# Patient Record
Sex: FEMALE | Race: WHITE | NOT HISPANIC OR LATINO | Employment: OTHER | URBAN - METROPOLITAN AREA
[De-identification: names, ages, dates, MRNs, and addresses within clinical notes are randomized per-mention and may not be internally consistent; named-entity substitution may affect disease eponyms.]

---

## 2017-03-20 ENCOUNTER — TRANSCRIBE ORDERS (OUTPATIENT)
Dept: LAB | Facility: CLINIC | Age: 82
End: 2017-03-20

## 2017-03-20 ENCOUNTER — APPOINTMENT (OUTPATIENT)
Dept: LAB | Facility: CLINIC | Age: 82
End: 2017-03-20
Payer: COMMERCIAL

## 2017-03-20 ENCOUNTER — GENERIC CONVERSION - ENCOUNTER (OUTPATIENT)
Dept: OTHER | Facility: OTHER | Age: 82
End: 2017-03-20

## 2017-03-20 DIAGNOSIS — R39.15 URGENCY OF URINATION: ICD-10-CM

## 2017-03-20 DIAGNOSIS — R39.15 URGENCY OF URINATION: Primary | ICD-10-CM

## 2017-03-20 DIAGNOSIS — I25.10 ATHEROSCLEROTIC HEART DISEASE OF NATIVE CORONARY ARTERY WITHOUT ANGINA PECTORIS: ICD-10-CM

## 2017-03-20 DIAGNOSIS — M81.0 AGE-RELATED OSTEOPOROSIS WITHOUT CURRENT PATHOLOGICAL FRACTURE: ICD-10-CM

## 2017-03-20 DIAGNOSIS — E78.5 HYPERLIPIDEMIA: ICD-10-CM

## 2017-03-20 LAB
BACTERIA UR QL AUTO: ABNORMAL /HPF
BILIRUB UR QL STRIP: NEGATIVE
CLARITY UR: ABNORMAL
COLOR UR: YELLOW
GLUCOSE UR STRIP-MCNC: NEGATIVE MG/DL
HGB UR QL STRIP.AUTO: ABNORMAL
KETONES UR STRIP-MCNC: NEGATIVE MG/DL
LEUKOCYTE ESTERASE UR QL STRIP: ABNORMAL
NITRITE UR QL STRIP: POSITIVE
NON-SQ EPI CELLS URNS QL MICRO: ABNORMAL /HPF
PH UR STRIP.AUTO: 6 [PH] (ref 4.5–8)
PROT UR STRIP-MCNC: NEGATIVE MG/DL
RBC #/AREA URNS AUTO: ABNORMAL /HPF
SP GR UR STRIP.AUTO: 1.01 (ref 1–1.03)
UROBILINOGEN UR QL STRIP.AUTO: 0.2 E.U./DL
WBC #/AREA URNS AUTO: ABNORMAL /HPF

## 2017-03-20 PROCEDURE — 81001 URINALYSIS AUTO W/SCOPE: CPT

## 2017-03-20 PROCEDURE — 87077 CULTURE AEROBIC IDENTIFY: CPT

## 2017-03-20 PROCEDURE — 87086 URINE CULTURE/COLONY COUNT: CPT

## 2017-03-20 PROCEDURE — 87186 SC STD MICRODIL/AGAR DIL: CPT

## 2017-03-22 LAB — BACTERIA UR CULT: NORMAL

## 2017-03-23 ENCOUNTER — GENERIC CONVERSION - ENCOUNTER (OUTPATIENT)
Dept: OTHER | Facility: OTHER | Age: 82
End: 2017-03-23

## 2017-03-29 ENCOUNTER — GENERIC CONVERSION - ENCOUNTER (OUTPATIENT)
Dept: OTHER | Facility: OTHER | Age: 82
End: 2017-03-29

## 2017-04-07 ENCOUNTER — ALLSCRIPTS OFFICE VISIT (OUTPATIENT)
Dept: OTHER | Facility: OTHER | Age: 82
End: 2017-04-07

## 2017-05-08 ENCOUNTER — APPOINTMENT (OUTPATIENT)
Dept: LAB | Facility: CLINIC | Age: 82
End: 2017-05-08
Payer: COMMERCIAL

## 2017-05-08 ENCOUNTER — TRANSCRIBE ORDERS (OUTPATIENT)
Dept: LAB | Facility: CLINIC | Age: 82
End: 2017-05-08

## 2017-05-08 ENCOUNTER — GENERIC CONVERSION - ENCOUNTER (OUTPATIENT)
Dept: OTHER | Facility: OTHER | Age: 82
End: 2017-05-08

## 2017-05-08 DIAGNOSIS — I25.10 ATHEROSCLEROTIC HEART DISEASE OF NATIVE CORONARY ARTERY WITHOUT ANGINA PECTORIS: ICD-10-CM

## 2017-05-08 DIAGNOSIS — M81.0 AGE-RELATED OSTEOPOROSIS WITHOUT CURRENT PATHOLOGICAL FRACTURE: ICD-10-CM

## 2017-05-08 DIAGNOSIS — E78.5 HYPERLIPIDEMIA: ICD-10-CM

## 2017-05-08 LAB
25(OH)D3 SERPL-MCNC: 38 NG/ML (ref 30–100)
ALBUMIN SERPL BCP-MCNC: 3.3 G/DL (ref 3.5–5)
ALP SERPL-CCNC: 71 U/L (ref 46–116)
ALT SERPL W P-5'-P-CCNC: 21 U/L (ref 12–78)
ANION GAP SERPL CALCULATED.3IONS-SCNC: 7 MMOL/L (ref 4–13)
AST SERPL W P-5'-P-CCNC: 26 U/L (ref 5–45)
BASOPHILS # BLD AUTO: 0.03 THOUSANDS/ΜL (ref 0–0.1)
BASOPHILS NFR BLD AUTO: 1 % (ref 0–1)
BILIRUB SERPL-MCNC: 0.6 MG/DL (ref 0.2–1)
BUN SERPL-MCNC: 17 MG/DL (ref 5–25)
CALCIUM SERPL-MCNC: 8.9 MG/DL (ref 8.3–10.1)
CHLORIDE SERPL-SCNC: 108 MMOL/L (ref 100–108)
CHOLEST SERPL-MCNC: 182 MG/DL (ref 50–200)
CO2 SERPL-SCNC: 30 MMOL/L (ref 21–32)
CREAT SERPL-MCNC: 0.91 MG/DL (ref 0.6–1.3)
EOSINOPHIL # BLD AUTO: 0.11 THOUSAND/ΜL (ref 0–0.61)
EOSINOPHIL NFR BLD AUTO: 2 % (ref 0–6)
ERYTHROCYTE [DISTWIDTH] IN BLOOD BY AUTOMATED COUNT: 15 % (ref 11.6–15.1)
GFR SERPL CREATININE-BSD FRML MDRD: 59.3 ML/MIN/1.73SQ M
GLUCOSE P FAST SERPL-MCNC: 93 MG/DL (ref 65–99)
HCT VFR BLD AUTO: 37.2 % (ref 34.8–46.1)
HDLC SERPL-MCNC: 72 MG/DL (ref 40–60)
HGB BLD-MCNC: 11.7 G/DL (ref 11.5–15.4)
LDLC SERPL CALC-MCNC: 94 MG/DL (ref 0–100)
LYMPHOCYTES # BLD AUTO: 2.46 THOUSANDS/ΜL (ref 0.6–4.47)
LYMPHOCYTES NFR BLD AUTO: 37 % (ref 14–44)
MCH RBC QN AUTO: 32 PG (ref 26.8–34.3)
MCHC RBC AUTO-ENTMCNC: 31.5 G/DL (ref 31.4–37.4)
MCV RBC AUTO: 102 FL (ref 82–98)
MONOCYTES # BLD AUTO: 0.65 THOUSAND/ΜL (ref 0.17–1.22)
MONOCYTES NFR BLD AUTO: 10 % (ref 4–12)
NEUTROPHILS # BLD AUTO: 3.41 THOUSANDS/ΜL (ref 1.85–7.62)
NEUTS SEG NFR BLD AUTO: 50 % (ref 43–75)
PLATELET # BLD AUTO: 174 THOUSANDS/UL (ref 149–390)
PMV BLD AUTO: 8.6 FL (ref 8.9–12.7)
POTASSIUM SERPL-SCNC: 3.6 MMOL/L (ref 3.5–5.3)
PROT SERPL-MCNC: 6.2 G/DL (ref 6.4–8.2)
RBC # BLD AUTO: 3.66 MILLION/UL (ref 3.81–5.12)
SODIUM SERPL-SCNC: 145 MMOL/L (ref 136–145)
TRIGL SERPL-MCNC: 82 MG/DL
WBC # BLD AUTO: 6.66 THOUSAND/UL (ref 4.31–10.16)

## 2017-05-08 PROCEDURE — 82306 VITAMIN D 25 HYDROXY: CPT

## 2017-05-08 PROCEDURE — 36415 COLL VENOUS BLD VENIPUNCTURE: CPT

## 2017-05-08 PROCEDURE — 85025 COMPLETE CBC W/AUTO DIFF WBC: CPT

## 2017-05-08 PROCEDURE — 80061 LIPID PANEL: CPT

## 2017-05-08 PROCEDURE — 80053 COMPREHEN METABOLIC PANEL: CPT

## 2017-05-31 ENCOUNTER — APPOINTMENT (EMERGENCY)
Dept: RADIOLOGY | Facility: HOSPITAL | Age: 82
DRG: 203 | End: 2017-05-31
Payer: COMMERCIAL

## 2017-05-31 ENCOUNTER — HOSPITAL ENCOUNTER (INPATIENT)
Facility: HOSPITAL | Age: 82
LOS: 3 days | Discharge: HOME | DRG: 203 | End: 2017-06-03
Attending: EMERGENCY MEDICINE | Admitting: INTERNAL MEDICINE
Payer: COMMERCIAL

## 2017-05-31 DIAGNOSIS — N39.0 UTI (URINARY TRACT INFECTION): ICD-10-CM

## 2017-05-31 DIAGNOSIS — J06.9 UPPER RESPIRATORY INFECTION: ICD-10-CM

## 2017-05-31 DIAGNOSIS — R50.9 FEVER: Primary | ICD-10-CM

## 2017-05-31 PROBLEM — R05.9 COUGH: Status: ACTIVE | Noted: 2017-05-31

## 2017-05-31 LAB
ALBUMIN SERPL BCP-MCNC: 3.6 G/DL (ref 3.5–5)
ALP SERPL-CCNC: 91 U/L (ref 46–116)
ALT SERPL W P-5'-P-CCNC: 18 U/L (ref 12–78)
ANION GAP SERPL CALCULATED.3IONS-SCNC: 9 MMOL/L (ref 4–13)
APTT PPP: 26 SECONDS (ref 23–35)
AST SERPL W P-5'-P-CCNC: 28 U/L (ref 5–45)
BACTERIA UR QL AUTO: ABNORMAL /HPF
BASOPHILS # BLD AUTO: 0.04 THOUSANDS/ΜL (ref 0–0.1)
BASOPHILS NFR BLD AUTO: 0 % (ref 0–1)
BILIRUB SERPL-MCNC: 0.8 MG/DL (ref 0.2–1)
BILIRUB UR QL STRIP: NEGATIVE
BUN SERPL-MCNC: 8 MG/DL (ref 5–25)
CALCIUM SERPL-MCNC: 8.9 MG/DL (ref 8.3–10.1)
CHLORIDE SERPL-SCNC: 102 MMOL/L (ref 100–108)
CLARITY UR: ABNORMAL
CO2 SERPL-SCNC: 29 MMOL/L (ref 21–32)
COLOR UR: YELLOW
CREAT SERPL-MCNC: 0.82 MG/DL (ref 0.6–1.3)
EOSINOPHIL # BLD AUTO: 0.08 THOUSAND/ΜL (ref 0–0.61)
EOSINOPHIL NFR BLD AUTO: 1 % (ref 0–6)
ERYTHROCYTE [DISTWIDTH] IN BLOOD BY AUTOMATED COUNT: 14.3 % (ref 11.6–15.1)
GFR SERPL CREATININE-BSD FRML MDRD: >60 ML/MIN/1.73SQ M
GLUCOSE SERPL-MCNC: 109 MG/DL (ref 65–140)
GLUCOSE UR STRIP-MCNC: NEGATIVE MG/DL
HCT VFR BLD AUTO: 39.3 % (ref 34.8–46.1)
HGB BLD-MCNC: 12.6 G/DL (ref 11.5–15.4)
HGB UR QL STRIP.AUTO: ABNORMAL
INR PPP: 1 (ref 0.86–1.16)
KETONES UR STRIP-MCNC: ABNORMAL MG/DL
LACTATE SERPL-SCNC: 1.3 MMOL/L (ref 0.5–2)
LEUKOCYTE ESTERASE UR QL STRIP: ABNORMAL
LYMPHOCYTES # BLD AUTO: 1.69 THOUSANDS/ΜL (ref 0.6–4.47)
LYMPHOCYTES NFR BLD AUTO: 17 % (ref 14–44)
MCH RBC QN AUTO: 32.4 PG (ref 26.8–34.3)
MCHC RBC AUTO-ENTMCNC: 32.1 G/DL (ref 31.4–37.4)
MCV RBC AUTO: 101 FL (ref 82–98)
MONOCYTES # BLD AUTO: 0.83 THOUSAND/ΜL (ref 0.17–1.22)
MONOCYTES NFR BLD AUTO: 8 % (ref 4–12)
NEUTROPHILS # BLD AUTO: 7.4 THOUSANDS/ΜL (ref 1.85–7.62)
NEUTS SEG NFR BLD AUTO: 74 % (ref 43–75)
NITRITE UR QL STRIP: POSITIVE
NON-SQ EPI CELLS URNS QL MICRO: ABNORMAL /HPF
OTHER STN SPEC: ABNORMAL
PH UR STRIP.AUTO: 6 [PH] (ref 4.5–8)
PLATELET # BLD AUTO: 169 THOUSANDS/UL (ref 149–390)
PMV BLD AUTO: 9.1 FL (ref 8.9–12.7)
POTASSIUM SERPL-SCNC: 3.4 MMOL/L (ref 3.5–5.3)
PROT SERPL-MCNC: 6.9 G/DL (ref 6.4–8.2)
PROT UR STRIP-MCNC: ABNORMAL MG/DL
PROTHROMBIN TIME: 13.5 SECONDS (ref 12.1–14.4)
RBC # BLD AUTO: 3.89 MILLION/UL (ref 3.81–5.12)
RBC #/AREA URNS AUTO: ABNORMAL /HPF
SODIUM SERPL-SCNC: 140 MMOL/L (ref 136–145)
SP GR UR STRIP.AUTO: 1.01 (ref 1–1.03)
UROBILINOGEN UR QL STRIP.AUTO: 0.2 E.U./DL
WBC # BLD AUTO: 10.04 THOUSAND/UL (ref 4.31–10.16)
WBC #/AREA URNS AUTO: ABNORMAL /HPF

## 2017-05-31 PROCEDURE — A9270 NON-COVERED ITEM OR SERVICE: HCPCS | Performed by: EMERGENCY MEDICINE

## 2017-05-31 PROCEDURE — 93005 ELECTROCARDIOGRAM TRACING: CPT | Performed by: EMERGENCY MEDICINE

## 2017-05-31 PROCEDURE — 85025 COMPLETE CBC W/AUTO DIFF WBC: CPT | Performed by: EMERGENCY MEDICINE

## 2017-05-31 PROCEDURE — 83605 ASSAY OF LACTIC ACID: CPT | Performed by: EMERGENCY MEDICINE

## 2017-05-31 PROCEDURE — 36415 COLL VENOUS BLD VENIPUNCTURE: CPT | Performed by: EMERGENCY MEDICINE

## 2017-05-31 PROCEDURE — 87077 CULTURE AEROBIC IDENTIFY: CPT | Performed by: EMERGENCY MEDICINE

## 2017-05-31 PROCEDURE — 87040 BLOOD CULTURE FOR BACTERIA: CPT | Performed by: EMERGENCY MEDICINE

## 2017-05-31 PROCEDURE — 85610 PROTHROMBIN TIME: CPT | Performed by: EMERGENCY MEDICINE

## 2017-05-31 PROCEDURE — 81001 URINALYSIS AUTO W/SCOPE: CPT | Performed by: EMERGENCY MEDICINE

## 2017-05-31 PROCEDURE — 96360 HYDRATION IV INFUSION INIT: CPT

## 2017-05-31 PROCEDURE — 99284 EMERGENCY DEPT VISIT MOD MDM: CPT

## 2017-05-31 PROCEDURE — 87086 URINE CULTURE/COLONY COUNT: CPT | Performed by: EMERGENCY MEDICINE

## 2017-05-31 PROCEDURE — 71020 HB CHEST X-RAY 2VW FRONTAL&LATL: CPT

## 2017-05-31 PROCEDURE — 85730 THROMBOPLASTIN TIME PARTIAL: CPT | Performed by: EMERGENCY MEDICINE

## 2017-05-31 PROCEDURE — 87186 SC STD MICRODIL/AGAR DIL: CPT | Performed by: EMERGENCY MEDICINE

## 2017-05-31 PROCEDURE — 96361 HYDRATE IV INFUSION ADD-ON: CPT

## 2017-05-31 PROCEDURE — 80053 COMPREHEN METABOLIC PANEL: CPT | Performed by: EMERGENCY MEDICINE

## 2017-05-31 RX ORDER — ACETAMINOPHEN 325 MG/1
650 TABLET ORAL EVERY 6 HOURS PRN
Status: DISCONTINUED | OUTPATIENT
Start: 2017-05-31 | End: 2017-06-03 | Stop reason: HOSPADM

## 2017-05-31 RX ORDER — 0.9 % SODIUM CHLORIDE 0.9 %
3 VIAL (ML) INJECTION AS NEEDED
Status: DISCONTINUED | OUTPATIENT
Start: 2017-05-31 | End: 2017-06-03 | Stop reason: HOSPADM

## 2017-05-31 RX ORDER — SACCHAROMYCES BOULARDII 250 MG
250 CAPSULE ORAL 2 TIMES DAILY
Status: DISCONTINUED | OUTPATIENT
Start: 2017-06-01 | End: 2017-06-03 | Stop reason: HOSPADM

## 2017-05-31 RX ORDER — FAMOTIDINE 20 MG/1
20 TABLET, FILM COATED ORAL 2 TIMES DAILY
Status: DISCONTINUED | OUTPATIENT
Start: 2017-06-01 | End: 2017-06-03 | Stop reason: HOSPADM

## 2017-05-31 RX ORDER — ACETAMINOPHEN 325 MG/1
975 TABLET ORAL ONCE
Status: COMPLETED | OUTPATIENT
Start: 2017-05-31 | End: 2017-05-31

## 2017-05-31 RX ORDER — GUAIFENESIN/DEXTROMETHORPHAN 100-10MG/5
10 SYRUP ORAL ONCE
Status: COMPLETED | OUTPATIENT
Start: 2017-05-31 | End: 2017-05-31

## 2017-05-31 RX ORDER — PANTOPRAZOLE SODIUM 40 MG/1
40 TABLET, DELAYED RELEASE ORAL
Status: DISCONTINUED | OUTPATIENT
Start: 2017-06-01 | End: 2017-06-03 | Stop reason: HOSPADM

## 2017-05-31 RX ORDER — BENZONATATE 100 MG/1
100 CAPSULE ORAL 3 TIMES DAILY PRN
Status: DISCONTINUED | OUTPATIENT
Start: 2017-05-31 | End: 2017-06-01

## 2017-05-31 RX ORDER — NITROGLYCERIN 0.4 MG/1
0.4 TABLET SUBLINGUAL
Status: DISCONTINUED | OUTPATIENT
Start: 2017-05-31 | End: 2017-06-03 | Stop reason: HOSPADM

## 2017-05-31 RX ORDER — PRASUGREL 10 MG/1
10 TABLET, FILM COATED ORAL DAILY
Status: DISCONTINUED | OUTPATIENT
Start: 2017-06-01 | End: 2017-06-03 | Stop reason: HOSPADM

## 2017-05-31 RX ORDER — IBUPROFEN 600 MG/1
600 TABLET ORAL ONCE
Status: DISCONTINUED | OUTPATIENT
Start: 2017-05-31 | End: 2017-05-31

## 2017-05-31 RX ORDER — NIACIN 500 MG/1
1000 TABLET, EXTENDED RELEASE ORAL
Status: DISCONTINUED | OUTPATIENT
Start: 2017-05-31 | End: 2017-06-03 | Stop reason: HOSPADM

## 2017-05-31 RX ORDER — SODIUM CHLORIDE 9 MG/ML
125 INJECTION, SOLUTION INTRAVENOUS CONTINUOUS
Status: DISCONTINUED | OUTPATIENT
Start: 2017-05-31 | End: 2017-05-31

## 2017-05-31 RX ORDER — ASPIRIN 81 MG/1
81 TABLET, CHEWABLE ORAL DAILY
Status: DISCONTINUED | OUTPATIENT
Start: 2017-06-01 | End: 2017-06-03 | Stop reason: HOSPADM

## 2017-05-31 RX ORDER — ONDANSETRON 2 MG/ML
4 INJECTION INTRAMUSCULAR; INTRAVENOUS EVERY 6 HOURS PRN
Status: DISCONTINUED | OUTPATIENT
Start: 2017-05-31 | End: 2017-06-03 | Stop reason: HOSPADM

## 2017-05-31 RX ORDER — GUAIFENESIN 600 MG
600 TABLET, EXTENDED RELEASE 12 HR ORAL 2 TIMES DAILY
Status: DISCONTINUED | OUTPATIENT
Start: 2017-06-01 | End: 2017-06-03 | Stop reason: HOSPADM

## 2017-05-31 RX ORDER — GUAIFENESIN 100 MG/5ML
200 SOLUTION ORAL EVERY 4 HOURS PRN
Status: DISCONTINUED | OUTPATIENT
Start: 2017-05-31 | End: 2017-06-01

## 2017-05-31 RX ORDER — PRAVASTATIN SODIUM 80 MG/1
80 TABLET ORAL
Status: DISCONTINUED | OUTPATIENT
Start: 2017-06-01 | End: 2017-06-03 | Stop reason: HOSPADM

## 2017-05-31 RX ADMIN — AZTREONAM 2000 MG: 1 INJECTION, POWDER, LYOPHILIZED, FOR SOLUTION INTRAMUSCULAR; INTRAVENOUS at 21:50

## 2017-05-31 RX ADMIN — ACETAMINOPHEN 975 MG: 325 TABLET, FILM COATED ORAL at 19:20

## 2017-05-31 RX ADMIN — SODIUM CHLORIDE 125 ML/HR: 0.9 INJECTION, SOLUTION INTRAVENOUS at 19:20

## 2017-05-31 RX ADMIN — GUAIFENESIN AND DEXTROMETHORPHAN 10 ML: 100; 10 SYRUP ORAL at 19:20

## 2017-06-01 LAB
ANION GAP SERPL CALCULATED.3IONS-SCNC: 10 MMOL/L (ref 4–13)
ATRIAL RATE: 85 BPM
BUN SERPL-MCNC: 7 MG/DL (ref 5–25)
CALCIUM SERPL-MCNC: 8.8 MG/DL (ref 8.3–10.1)
CHLORIDE SERPL-SCNC: 107 MMOL/L (ref 100–108)
CO2 SERPL-SCNC: 26 MMOL/L (ref 21–32)
CREAT SERPL-MCNC: 0.7 MG/DL (ref 0.6–1.3)
ERYTHROCYTE [DISTWIDTH] IN BLOOD BY AUTOMATED COUNT: 14.2 % (ref 11.6–15.1)
GFR SERPL CREATININE-BSD FRML MDRD: >60 ML/MIN/1.73SQ M
GLUCOSE SERPL-MCNC: 96 MG/DL (ref 65–140)
HCT VFR BLD AUTO: 36 % (ref 34.8–46.1)
HGB BLD-MCNC: 11.5 G/DL (ref 11.5–15.4)
MCH RBC QN AUTO: 32 PG (ref 26.8–34.3)
MCHC RBC AUTO-ENTMCNC: 31.9 G/DL (ref 31.4–37.4)
MCV RBC AUTO: 100 FL (ref 82–98)
P AXIS: 59 DEGREES
PLATELET # BLD AUTO: 158 THOUSANDS/UL (ref 149–390)
PMV BLD AUTO: 9.4 FL (ref 8.9–12.7)
POTASSIUM SERPL-SCNC: 3.4 MMOL/L (ref 3.5–5.3)
PR INTERVAL: 184 MS
QRS AXIS: 9 DEGREES
QRSD INTERVAL: 84 MS
QT INTERVAL: 354 MS
QTC INTERVAL: 421 MS
RBC # BLD AUTO: 3.59 MILLION/UL (ref 3.81–5.12)
S PYO AG THROAT QL: NEGATIVE
SODIUM SERPL-SCNC: 143 MMOL/L (ref 136–145)
T WAVE AXIS: 41 DEGREES
VENTRICULAR RATE: 85 BPM
WBC # BLD AUTO: 9.01 THOUSAND/UL (ref 4.31–10.16)

## 2017-06-01 PROCEDURE — A9270 NON-COVERED ITEM OR SERVICE: HCPCS | Performed by: PHYSICIAN ASSISTANT

## 2017-06-01 PROCEDURE — A9270 NON-COVERED ITEM OR SERVICE: HCPCS | Performed by: INTERNAL MEDICINE

## 2017-06-01 PROCEDURE — 87633 RESP VIRUS 12-25 TARGETS: CPT | Performed by: INTERNAL MEDICINE

## 2017-06-01 PROCEDURE — 87430 STREP A AG IA: CPT | Performed by: INTERNAL MEDICINE

## 2017-06-01 PROCEDURE — 80048 BASIC METABOLIC PNL TOTAL CA: CPT | Performed by: PHYSICIAN ASSISTANT

## 2017-06-01 PROCEDURE — 85027 COMPLETE CBC AUTOMATED: CPT | Performed by: PHYSICIAN ASSISTANT

## 2017-06-01 PROCEDURE — 87070 CULTURE OTHR SPECIMN AEROBIC: CPT | Performed by: INTERNAL MEDICINE

## 2017-06-01 RX ORDER — POTASSIUM CHLORIDE 20 MEQ/1
40 TABLET, EXTENDED RELEASE ORAL ONCE
Status: COMPLETED | OUTPATIENT
Start: 2017-06-01 | End: 2017-06-01

## 2017-06-01 RX ORDER — BENZONATATE 100 MG/1
200 CAPSULE ORAL 3 TIMES DAILY
Status: DISCONTINUED | OUTPATIENT
Start: 2017-06-01 | End: 2017-06-03 | Stop reason: HOSPADM

## 2017-06-01 RX ORDER — GUAIFENESIN/DEXTROMETHORPHAN 100-10MG/5
10 SYRUP ORAL EVERY 6 HOURS PRN
Status: DISCONTINUED | OUTPATIENT
Start: 2017-06-01 | End: 2017-06-03 | Stop reason: HOSPADM

## 2017-06-01 RX ADMIN — FAMOTIDINE 20 MG: 20 TABLET ORAL at 10:17

## 2017-06-01 RX ADMIN — NIACIN 1000 MG: 500 TABLET, FILM COATED, EXTENDED RELEASE ORAL at 00:39

## 2017-06-01 RX ADMIN — GUAIFENESIN 600 MG: 600 TABLET, EXTENDED RELEASE ORAL at 10:17

## 2017-06-01 RX ADMIN — BENZONATATE 200 MG: 100 CAPSULE ORAL at 20:46

## 2017-06-01 RX ADMIN — Medication 250 MG: at 17:32

## 2017-06-01 RX ADMIN — Medication 250 MG: at 10:17

## 2017-06-01 RX ADMIN — NIACIN 1000 MG: 500 TABLET, FILM COATED, EXTENDED RELEASE ORAL at 21:06

## 2017-06-01 RX ADMIN — AZTREONAM 1000 MG: 1 INJECTION, POWDER, LYOPHILIZED, FOR SOLUTION INTRAMUSCULAR; INTRAVENOUS at 14:10

## 2017-06-01 RX ADMIN — FAMOTIDINE 20 MG: 20 TABLET ORAL at 17:31

## 2017-06-01 RX ADMIN — GUAIFENESIN 200 MG: 100 SOLUTION ORAL at 12:52

## 2017-06-01 RX ADMIN — PANTOPRAZOLE SODIUM 40 MG: 40 TABLET, DELAYED RELEASE ORAL at 08:24

## 2017-06-01 RX ADMIN — ACETAMINOPHEN 650 MG: 325 TABLET, FILM COATED ORAL at 20:46

## 2017-06-01 RX ADMIN — BENZONATATE 200 MG: 100 CAPSULE ORAL at 17:30

## 2017-06-01 RX ADMIN — GUAIFENESIN AND DEXTROMETHORPHAN 10 ML: 100; 10 SYRUP ORAL at 20:47

## 2017-06-01 RX ADMIN — GUAIFENESIN 200 MG: 100 SOLUTION ORAL at 00:39

## 2017-06-01 RX ADMIN — POTASSIUM CHLORIDE 40 MEQ: 1500 TABLET, EXTENDED RELEASE ORAL at 14:10

## 2017-06-01 RX ADMIN — AZTREONAM 1000 MG: 1 INJECTION, POWDER, LYOPHILIZED, FOR SOLUTION INTRAMUSCULAR; INTRAVENOUS at 04:34

## 2017-06-01 RX ADMIN — PRAVASTATIN SODIUM 80 MG: 80 TABLET ORAL at 17:30

## 2017-06-01 RX ADMIN — GUAIFENESIN 200 MG: 100 SOLUTION ORAL at 04:44

## 2017-06-01 RX ADMIN — BENZONATATE 100 MG: 100 CAPSULE ORAL at 08:38

## 2017-06-01 RX ADMIN — GUAIFENESIN 600 MG: 600 TABLET, EXTENDED RELEASE ORAL at 17:31

## 2017-06-01 RX ADMIN — ASPIRIN 81 MG 81 MG: 81 TABLET ORAL at 10:17

## 2017-06-01 RX ADMIN — ENOXAPARIN SODIUM 40 MG: 40 INJECTION SUBCUTANEOUS at 08:25

## 2017-06-02 PROBLEM — J40 TRACHEOBRONCHITIS: Status: ACTIVE | Noted: 2017-05-31

## 2017-06-02 LAB
ANION GAP SERPL CALCULATED.3IONS-SCNC: 5 MMOL/L (ref 4–13)
BACTERIA UR CULT: NORMAL
BASOPHILS # BLD AUTO: 0.05 THOUSANDS/ΜL (ref 0–0.1)
BASOPHILS NFR BLD AUTO: 1 % (ref 0–1)
BUN SERPL-MCNC: 10 MG/DL (ref 5–25)
CALCIUM SERPL-MCNC: 8.6 MG/DL (ref 8.3–10.1)
CHLORIDE SERPL-SCNC: 109 MMOL/L (ref 100–108)
CO2 SERPL-SCNC: 27 MMOL/L (ref 21–32)
CREAT SERPL-MCNC: 0.72 MG/DL (ref 0.6–1.3)
EOSINOPHIL # BLD AUTO: 0.31 THOUSAND/ΜL (ref 0–0.61)
EOSINOPHIL NFR BLD AUTO: 5 % (ref 0–6)
ERYTHROCYTE [DISTWIDTH] IN BLOOD BY AUTOMATED COUNT: 14.1 % (ref 11.6–15.1)
GFR SERPL CREATININE-BSD FRML MDRD: >60 ML/MIN/1.73SQ M
GLUCOSE SERPL-MCNC: 95 MG/DL (ref 65–140)
HCT VFR BLD AUTO: 34.5 % (ref 34.8–46.1)
HGB BLD-MCNC: 10.8 G/DL (ref 11.5–15.4)
LYMPHOCYTES # BLD AUTO: 2.08 THOUSANDS/ΜL (ref 0.6–4.47)
LYMPHOCYTES NFR BLD AUTO: 36 % (ref 14–44)
MAGNESIUM SERPL-MCNC: 1.9 MG/DL (ref 1.6–2.6)
MCH RBC QN AUTO: 31.8 PG (ref 26.8–34.3)
MCHC RBC AUTO-ENTMCNC: 31.3 G/DL (ref 31.4–37.4)
MCV RBC AUTO: 102 FL (ref 82–98)
MONOCYTES # BLD AUTO: 0.9 THOUSAND/ΜL (ref 0.17–1.22)
MONOCYTES NFR BLD AUTO: 16 % (ref 4–12)
NEUTROPHILS # BLD AUTO: 2.39 THOUSANDS/ΜL (ref 1.85–7.62)
NEUTS SEG NFR BLD AUTO: 42 % (ref 43–75)
PLATELET # BLD AUTO: 151 THOUSANDS/UL (ref 149–390)
PMV BLD AUTO: 9.7 FL (ref 8.9–12.7)
POTASSIUM SERPL-SCNC: 4.1 MMOL/L (ref 3.5–5.3)
RBC # BLD AUTO: 3.4 MILLION/UL (ref 3.81–5.12)
SODIUM SERPL-SCNC: 141 MMOL/L (ref 136–145)
WBC # BLD AUTO: 5.73 THOUSAND/UL (ref 4.31–10.16)

## 2017-06-02 PROCEDURE — 83735 ASSAY OF MAGNESIUM: CPT | Performed by: INTERNAL MEDICINE

## 2017-06-02 PROCEDURE — 85025 COMPLETE CBC W/AUTO DIFF WBC: CPT | Performed by: INTERNAL MEDICINE

## 2017-06-02 PROCEDURE — A9270 NON-COVERED ITEM OR SERVICE: HCPCS | Performed by: PHYSICIAN ASSISTANT

## 2017-06-02 PROCEDURE — A9270 NON-COVERED ITEM OR SERVICE: HCPCS | Performed by: INTERNAL MEDICINE

## 2017-06-02 PROCEDURE — 80048 BASIC METABOLIC PNL TOTAL CA: CPT | Performed by: INTERNAL MEDICINE

## 2017-06-02 RX ADMIN — ASPIRIN 81 MG 81 MG: 81 TABLET ORAL at 08:52

## 2017-06-02 RX ADMIN — BENZONATATE 200 MG: 100 CAPSULE ORAL at 21:11

## 2017-06-02 RX ADMIN — MENTHOL 5.4 MG: 5.4 LOZENGE ORAL at 14:30

## 2017-06-02 RX ADMIN — ENOXAPARIN SODIUM 40 MG: 40 INJECTION SUBCUTANEOUS at 08:52

## 2017-06-02 RX ADMIN — GUAIFENESIN 600 MG: 600 TABLET, EXTENDED RELEASE ORAL at 17:49

## 2017-06-02 RX ADMIN — Medication 250 MG: at 17:49

## 2017-06-02 RX ADMIN — PANTOPRAZOLE SODIUM 40 MG: 40 TABLET, DELAYED RELEASE ORAL at 06:16

## 2017-06-02 RX ADMIN — GUAIFENESIN AND DEXTROMETHORPHAN 10 ML: 100; 10 SYRUP ORAL at 06:16

## 2017-06-02 RX ADMIN — FAMOTIDINE 20 MG: 20 TABLET ORAL at 17:49

## 2017-06-02 RX ADMIN — GUAIFENESIN 600 MG: 600 TABLET, EXTENDED RELEASE ORAL at 08:52

## 2017-06-02 RX ADMIN — PRAVASTATIN SODIUM 80 MG: 80 TABLET ORAL at 16:39

## 2017-06-02 RX ADMIN — Medication 250 MG: at 08:52

## 2017-06-02 RX ADMIN — BENZONATATE 200 MG: 100 CAPSULE ORAL at 16:39

## 2017-06-02 RX ADMIN — FAMOTIDINE 20 MG: 20 TABLET ORAL at 08:52

## 2017-06-02 RX ADMIN — NIACIN 1000 MG: 500 TABLET, FILM COATED, EXTENDED RELEASE ORAL at 21:15

## 2017-06-02 RX ADMIN — BENZONATATE 200 MG: 100 CAPSULE ORAL at 08:52

## 2017-06-02 RX ADMIN — GUAIFENESIN AND DEXTROMETHORPHAN 10 ML: 100; 10 SYRUP ORAL at 21:11

## 2017-06-03 VITALS
OXYGEN SATURATION: 99 % | RESPIRATION RATE: 18 BRPM | BODY MASS INDEX: 32.59 KG/M2 | TEMPERATURE: 98.3 F | HEART RATE: 70 BPM | WEIGHT: 172.62 LBS | HEIGHT: 61 IN | SYSTOLIC BLOOD PRESSURE: 150 MMHG | DIASTOLIC BLOOD PRESSURE: 68 MMHG

## 2017-06-03 PROBLEM — N39.0 UTI (URINARY TRACT INFECTION): Status: RESOLVED | Noted: 2017-05-31 | Resolved: 2017-06-03

## 2017-06-03 LAB — BACTERIA THROAT CULT: NORMAL

## 2017-06-03 PROCEDURE — A9270 NON-COVERED ITEM OR SERVICE: HCPCS | Performed by: PHYSICIAN ASSISTANT

## 2017-06-03 PROCEDURE — A9270 NON-COVERED ITEM OR SERVICE: HCPCS | Performed by: INTERNAL MEDICINE

## 2017-06-03 RX ORDER — BENZONATATE 200 MG/1
200 CAPSULE ORAL 3 TIMES DAILY
Qty: 21 CAPSULE | Refills: 0 | Status: SHIPPED | OUTPATIENT
Start: 2017-06-03 | End: 2017-06-10

## 2017-06-03 RX ORDER — SACCHAROMYCES BOULARDII 250 MG
250 CAPSULE ORAL 2 TIMES DAILY
Qty: 20 CAPSULE | Refills: 0 | Status: SHIPPED | OUTPATIENT
Start: 2017-06-03 | End: 2017-06-13

## 2017-06-03 RX ORDER — GUAIFENESIN 600 MG
600 TABLET, EXTENDED RELEASE 12 HR ORAL 2 TIMES DAILY
Qty: 14 TABLET | Refills: 0 | Status: SHIPPED | OUTPATIENT
Start: 2017-06-03 | End: 2017-06-10

## 2017-06-03 RX ADMIN — ASPIRIN 81 MG 81 MG: 81 TABLET ORAL at 08:40

## 2017-06-03 RX ADMIN — ENOXAPARIN SODIUM 40 MG: 40 INJECTION SUBCUTANEOUS at 08:40

## 2017-06-03 RX ADMIN — BENZONATATE 200 MG: 100 CAPSULE ORAL at 08:40

## 2017-06-03 RX ADMIN — FAMOTIDINE 20 MG: 20 TABLET ORAL at 08:41

## 2017-06-03 RX ADMIN — Medication 250 MG: at 08:43

## 2017-06-03 RX ADMIN — GUAIFENESIN AND DEXTROMETHORPHAN 10 ML: 100; 10 SYRUP ORAL at 11:01

## 2017-06-03 RX ADMIN — PANTOPRAZOLE SODIUM 40 MG: 40 TABLET, DELAYED RELEASE ORAL at 06:06

## 2017-06-03 RX ADMIN — GUAIFENESIN 600 MG: 600 TABLET, EXTENDED RELEASE ORAL at 08:41

## 2017-06-05 ENCOUNTER — GENERIC CONVERSION - ENCOUNTER (OUTPATIENT)
Dept: OTHER | Facility: OTHER | Age: 82
End: 2017-06-05

## 2017-06-05 LAB
BACTERIA BLD CULT: NORMAL
BACTERIA BLD CULT: NORMAL

## 2017-06-06 LAB
ADENOVIRUS: NOT DETECTED
C PNEUM DNA SPEC QL NAA+PROBE: NOT DETECTED
FLUAV H1 RNA SPEC QL NAA+PROBE: NOT DETECTED
FLUAV H3 RNA SPEC QL NAA+PROBE: NOT DETECTED
FLUAV RNA SPEC QL NAA+PROBE: NOT DETECTED
FLUBV RNA SPEC QL NAA+PROBE: NOT DETECTED
HCOV 229E RNA SPEC QL NAA+PROBE: NOT DETECTED
HCOV HKU1 RNA SPEC QL NAA+PROBE: NOT DETECTED
HCOV NL63 RNA SPEC QL NAA+PROBE: NOT DETECTED
HCOV OC43 RNA SPEC QL NAA+PROBE: NOT DETECTED
HPIV1 RNA SPEC QL NAA+PROBE: NOT DETECTED
HPIV2 RNA SPEC QL NAA+PROBE: NOT DETECTED
HPIV3 RNA SPEC QL NAA+PROBE: NOT DETECTED
HPIV4 RNA SPEC QL NAA+PROBE: NOT DETECTED
M PNEUMO DNA SPEC QL NAA+PROBE: NOT DETECTED
METAPNEUMOVIRUS: NOT DETECTED
RHINOVIRUS RNA SPEC QL NAA+PROBE: DETECTED
RSV A RNA SPEC QL NAA+PROBE: NOT DETECTED
RSV B RNA SPEC QL NAA+PROBE: NOT DETECTED

## 2017-06-08 ENCOUNTER — GENERIC CONVERSION - ENCOUNTER (OUTPATIENT)
Dept: OTHER | Facility: OTHER | Age: 82
End: 2017-06-08

## 2017-06-12 ENCOUNTER — ALLSCRIPTS OFFICE VISIT (OUTPATIENT)
Dept: OTHER | Facility: OTHER | Age: 82
End: 2017-06-12

## 2017-07-18 ENCOUNTER — GENERIC CONVERSION - ENCOUNTER (OUTPATIENT)
Dept: OTHER | Facility: OTHER | Age: 82
End: 2017-07-18

## 2017-07-20 ENCOUNTER — GENERIC CONVERSION - ENCOUNTER (OUTPATIENT)
Dept: OTHER | Facility: OTHER | Age: 82
End: 2017-07-20

## 2017-07-26 ENCOUNTER — ALLSCRIPTS OFFICE VISIT (OUTPATIENT)
Dept: OTHER | Facility: OTHER | Age: 82
End: 2017-07-26

## 2017-08-03 ENCOUNTER — GENERIC CONVERSION - ENCOUNTER (OUTPATIENT)
Dept: OTHER | Facility: OTHER | Age: 82
End: 2017-08-03

## 2017-09-22 ENCOUNTER — GENERIC CONVERSION - ENCOUNTER (OUTPATIENT)
Dept: OTHER | Facility: OTHER | Age: 82
End: 2017-09-22

## 2017-10-09 ENCOUNTER — ALLSCRIPTS OFFICE VISIT (OUTPATIENT)
Dept: OTHER | Facility: OTHER | Age: 82
End: 2017-10-09

## 2017-10-10 NOTE — PROGRESS NOTES
Assessment  1  Non-smoker (V49 89) (Z78 9)   2  Chronic low back pain (724 2,338 29) (M54 5,G89 29)   3  Dyslipidemia (272 4) (E78 5)   4  Bursitis of left hip (726 5) (M70 72)   5  S/P PTCA (percutaneous transluminal coronary angioplasty) (V45 82) (Z98 61)   6  Esophageal reflux (530 81) (K21 9)    Plan  Dyslipidemia    · (1) CBC/PLT/DIFF; Status:Active; Requested ABJ:[id number];    · (1) COMPREHENSIVE METABOLIC PANEL; Status:Active; Requested for:01Mar2018;    · (1) LIPID PANEL FASTING W DIRECT LDL REFLEX; Status:Active; Requested for:01Mar2018;   Esophageal reflux    · From  Famotidine 20 MG Oral Tablet take 1 tablet by mouth twice daily To Famotidine 20 MG  Oral Tablet take 1 tablet by mouth daily  PMH: History of chest pain    · Nitroglycerin 0 4 MG Sublingual Tablet Sublingual (Nitrostat); place 1 tablet under the tongue  every 5 minutes for up to 3 doses as needed for chest pain  call 911 if pain  persists    Discussion/Summary  Discussion Summary:   Chronic low back pain- exercises given to hernot jackie, vacuum one room at a timetake Tylenol or lidocaine patch OTCcontrolled on simvastatinhip bursitis- pain controlled, will f/u with ortho prnNTG rx given to hershe has been on PPI and H2 blocker, cut back on famotidine to once a [id number] with labs    Counseling Documentation With Imm: The patient was counseled regarding impressions  Medication SE Review and Pt Understands Tx: Possible side effects of new medications were reviewed with the patient/guardian today  The treatment plan was reviewed with the patient/guardian  The patient/guardian understands and agrees with the treatment plan      Chief Complaint  Chief Complaint Free Text Note Form: Patient presents to office today for a follow-up visit  Chief Complaint Chronic Condition [facility]: Patient is here today for follow up of chronic conditions described in HPI        History of Present Illness  HPI: Low back pain, acute on chronic now that she is doing more housework living with her sonnumbness tingling in the legs, pain does not radiatetaking meds OTC but using warm compressleft hip pain and gets injections from Dr Montgomery Sit every 3 months, does not feel she needs it anytime soonpain in the lower ribs right and leftoccasionally      Review of Systems  Complete-Female:   Constitutional: recent weight loss, but-no fever-and-no chills  ENT: no sore throat-and-no nasal discharge  Cardiovascular: no chest pain,-no palpitations-and-no lower extremity edema  Respiratory: no shortness of breath-and-no cough  Gastrointestinal: no abdominal pain,-no constipation-and-no diarrhea  Musculoskeletal: as noted in HPI  Preventive Quality 65 Older:   Preventive Quality 65 and Older: The patient is currently experiencing urinary symptoms  Urinary Incontinence Symptoms includes: nocturia       Active Problems  1  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)   2  Allergic rhinitis (477 9) (J30 9)   3  Benign paroxysmal positional vertigo (386 11) (H81 10)   4  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)   5  Dyslipidemia (272 4) (E78 5)   6  History of Dyspnea (786 09) (R06 00)   7  Easy bruisability (782 9) (R23 8)   8  Edema of lower extremity (782 3) (R60 0)   9  Encounter for screening mammogram for breast cancer (V76 12) (Z12 31)   10  Esophageal reflux (530 81) (K21 9)   11  Flu vaccine need (V04 81) (Z23)   12  Generalized osteoarthritis (715 00) (M15 9)   13  Left ventricular hypertrophy by electrocardiogram (429 3) (I51 7)   14  Need for pneumococcal vaccine (V03 82) (Z23)   15  Need for vaccination with 13-polyvalent pneumococcal conjugate vaccine (V03 82) (Z23)   16  Nonspecific abnormal results of function study of pulmonary system (794 2) (R94 2)   17  Osteoporosis (733 00) (M81 0)   18  Right knee pain (719 46) (M25 561)   19  S/P PTCA (percutaneous transluminal coronary angioplasty) (V45 82) (Z98 61)   20  S/P right knee surgery (V45 89) (Z98 890)   21  Screening for colon cancer (V76 51) (Z12 11)   22  Screening for genitourinary condition (V81 6) (Z13 89)   23  Screening for neurological condition (V80 09) (Z13 89)   24  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)   25  Urgency of urination (788 63) (R39 15)    Past Medical History  1  History of Acute upper respiratory infection (465 9) (J06 9)   2  History of Arthritis (V13 4)   3  History of C  difficile diarrhea (008 45) (A04 72)   4  History of Cough (786 2) (R05)   5  History of Dyspnea (786 09) (R06 00)   6  History of allergy (V15 09) (Z88 9)   7  History of backache (V13 59) (Z87 39)   8  History of chest pain (V13 89) (Z87 898)   9  History of esophageal reflux (V12 79) (Z87 19)   10  History of hyperlipidemia (V12 29) (Z86 39)   11  History of hypertension (V12 59) (Z86 79)   12  History of nausea (V12 79) (Z87 898)   13  History of urinary tract infection (V13 02) (Z87 440)   14  History of Impacted cerumen of right ear (380 4) (H61 21)   15  History of Preop examination (V72 84) (Z01 818)   16  History of Preop pulmonary/respiratory exam (V72 82) (C44 664)  Active Problems And Past Medical History Reviewed: The active problems and past medical history were reviewed and updated today  Surgical History  1  History of Appendectomy   2  History of Cath Stent Placement   3  History of Diagnostic Cystoscopy   4  History of Hernia Repair   5  History of Hip Replacement   6  History of Knee Surgery   7  History of Shoulder Surgery  Surgical History Reviewed: The surgical history was reviewed and updated today  Family History  Mother    1  Family history of Breast Cancer (V16 3)   2  Family history of leukemia (V16 6) (Z80 6)   3  Family history of Gout (V18 19)   4  Family history of Hypertension (V17 49)  Father    5  Family history of Bone Cancer  Sister    6  Family history of Cervical Cancer  Family History    7  Family history of Aneurysm   8   Family history of myocardial infarction (V17 3) (Z82 49)   9  Family history of rheumatoid arthritis (V17 7) (Z82 61)  Family History Reviewed: The family history was reviewed and updated today  Social History   · Caffeine use (V49 89) (F15 90)   · Completed 8th grade   ·    · Four children   · No caffeine use   · Non-smoker (V49 89) (Z78 9)   · Retired   · Social alcohol use (Z78 9)  Social History Reviewed: The social history was reviewed and updated today  Current Meds   1  Aspirin 81 MG TABS; TAKE 1 TABLET DAILY; Therapy: (Recorded:18Jun2015) to Recorded   2  Centrum Silver Oral Tablet; TAKE 1 TABLET DAILY; Therapy: (Recorded:58Aui5604) to Recorded   3  Citracal/Vitamin D TABS; Take 1 tablet twice daily; Therapy: (Recorded:18Jun2015) to Recorded   4  Famotidine 20 MG Oral Tablet; take 1 tablet by mouth twice daily; Therapy: 77BFL4507 to (Evaluate:77Cbu7416)  Requested for: 55RZL9096; Last Rx:70Mku4488   Ordered   5  Niacin ER (Antihyperlipidemic) 1000 MG Oral Tablet Extended Release; TAKE 1 TABLET BY MOUTH   EVERY NIGHT AT BEDTIME; Therapy: 20MRA5921 to (Georg Ormond)  Requested for: 53FZA5589; Last UW:71SSI8940   Ordered   6  Nitrostat 0 4 MG Sublingual Tablet Sublingual; place 1 tablet under the tongue every 5 minutes for up   to 3 doses as needed for chest pain  call 911 if pain persists; Therapy: 09FOR5743 to (Evaluate:27Jun2016)  Requested for: 07XEO5043; Last Rx:29Mar2016   Ordered   7  Pantoprazole Sodium 40 MG Oral Tablet Delayed Release; take 1 tablet by mouth every day; Therapy: 43WBM5956 to (Georg Ormond)  Requested for: 07Kiy0225; Last Rx:04Apr2017   Ordered   8  Probiotic 250 MG Oral Capsule; TAKE 1 CAPSULE TWICE DAILY FOR 30 DAYS; Therapy: (Recorded:11Nov2016) to Recorded   9  Simvastatin 40 MG Oral Tablet; take 1 tablet by mouth daily; Therapy: 72DOP3501 to (Evaluate:72Why3839)  Requested for: 74DHU2115; Last VN:65MDP8532   Ordered   10   Tylenol Arthritis Pain 650 MG TBCR; TAKE 2 TABLET IN THE MORNING AND 1 TABLET  IN THE    EVENING PRN; Therapy: (Recorded:16Edo0153) to Recorded   11  Vitamin D3 1000 UNIT Oral Tablet; Take 1 tablet daily; Therapy: (Recorded:18Jun2015) to Recorded  Medication List Reviewed: The medication list was reviewed and updated today  Allergies  1  Cephalexin TABS   2  Cipro TABS   3  Claritin TABS   4  Dexamethasone TABS   5  Erythromycin Base TABS   6  Erythromycin TABS   7  Influenza Virus Vaccine INJ   8  Iodine Solution SOLN   9  Loratadine TABS   10  Macrobid CAPS   11  Penicillins   12  Percocet TABS   13  Pneumococcal Vaccines   14  PriLOSEC OTC TBEC    Vitals  Vital Signs    Recorded: 20KAL2543 02:40PM   Heart Rate 77   Systolic 838   Diastolic 78   Height 5 ft 1 5 in   Weight 174 lb 2 oz   BMI Calculated 32 37   BSA Calculated 1 79   O2 Saturation 93     Physical Exam    Constitutional   General appearance: No acute distress, well appearing and well nourished  Eyes   Conjunctiva and lids: No swelling, erythema or discharge  Ears, Nose, Mouth, and Throat   Otoscopic examination: Tympanic membranes translucent with normal light reflex  Canals patent without erythema  Oropharynx: Normal with no erythema, edema, exudate or lesions  Pulmonary   Respiratory effort: No increased work of breathing or signs of respiratory distress  Auscultation of lungs: Clear to auscultation  Cardiovascular   Auscultation of heart: Normal rate and rhythm, normal S1 and S2, without murmurs  Examination of extremities for edema and/or varicosities: Normal     Abdomen   Abdomen: Non-tender, no masses  Liver and spleen: No hepatomegaly or splenomegaly  Lymphatic   Palpation of lymph nodes in neck: No lymphadenopathy  Musculoskeletal   Gait and station: Normal     Psychiatric   Orientation to person, place, and time: Normal     Mood and affect: Normal     Additional Exam:  tender in the lower ribs, bilaterally          Results/Data  Falls Risk Assessment (Dx Z13 89 Screen for Neurologic Disorder) 81YYT6636 02:41PM User, Ahs     Test Name Result Flag Reference   Falls Risk      No falls in the past year       Future Appointments    Date/Time Provider Specialty Site   11/20/2017 04:40 PM KRYSTEN Gross   Cardiology [facility]     Signatures   Electronically signed by : KRYSTEN Gillis ; Oct  9 2017  3:26PM EST                       (Author)

## 2017-10-30 DIAGNOSIS — Z12.31 ENCOUNTER FOR SCREENING MAMMOGRAM FOR MALIGNANT NEOPLASM OF BREAST: ICD-10-CM

## 2017-11-16 ENCOUNTER — GENERIC CONVERSION - ENCOUNTER (OUTPATIENT)
Dept: OTHER | Facility: OTHER | Age: 82
End: 2017-11-16

## 2017-11-17 ENCOUNTER — HOSPITAL ENCOUNTER (OUTPATIENT)
Dept: RADIOLOGY | Age: 82
Discharge: HOME | End: 2017-11-17
Payer: COMMERCIAL

## 2017-11-17 DIAGNOSIS — Z12.31 ENCOUNTER FOR SCREENING MAMMOGRAM FOR MALIGNANT NEOPLASM OF BREAST: ICD-10-CM

## 2017-11-17 PROCEDURE — G0202 SCR MAMMO BI INCL CAD: HCPCS

## 2017-11-20 ENCOUNTER — GENERIC CONVERSION - ENCOUNTER (OUTPATIENT)
Dept: OTHER | Facility: OTHER | Age: 82
End: 2017-11-20

## 2017-12-07 ENCOUNTER — GENERIC CONVERSION - ENCOUNTER (OUTPATIENT)
Dept: INTERNAL MEDICINE CLINIC | Facility: CLINIC | Age: 82
End: 2017-12-07

## 2018-01-09 NOTE — RESULT NOTES
Message  Spoke with patient  Urinary urgency 5 days ago (Sat) and called Monday   UA ordered-culture grew Ecoli, pansensitive  Symptom is the same, no burning, abd pain, fever, nausea, vomiting  She has multiple antibiotic allergies and all oral options on the C/S she is allergic to (reaction is a rash)  She is possibly colonized too- previous UAs have all had WBCs, bacteria  We discussed a watch an wait approach at this time  Go to the ER if she develops fever, chills, abd pain, fatigue, vomiting  She is agreeing to this  Stay well hydrated             Verified Results  (1) URINALYSIS w URINE C/S REFLEX (will reflex a microscopy if leukocytes, occult blood, or nitrites are not within normal limits) 20TMJ2490 03:03PM Roxana Cheng    Order Number: [id number]     Test Name Result Flag Reference   COLOR Yellow     CLARITY Slightly Cloudy     PH UA 6 0  4 5-8 0   LEUKOCYTE ESTERASE UA Large A Negative   NITRITE UA Positive A Negative   PROTEIN UA Negative mg/dl  Negative   GLUCOSE UA Negative mg/dl  Negative   KETONES UA Negative mg/dl  Negative   UROBILINOGEN UA 0 2 E U /dl  0 2, 1 0 E U /dl   BILIRUBIN UA Negative  Negative   BLOOD UA Moderate A Negative   SPECIFIC GRAVITY UA 1 010  1 003-1 030   BACTERIA Occasional /hpf  None Seen, Occasional   EPITHELIAL CELLS Occasional /hpf  None Seen, Occasional   RBC UA Innumerable /hpf A None Seen   WBC UA 0-1 /hpf A None Seen   CLINICAL REPORT (Report)     Test:        Urine culture  Specimen Type:   Urine  Specimen Date:   3/20/2017 3:03 PM  Result Date:    3/22/2017 12:29 PM  Result Status:   Final result  Resulting Lab:   [address] [address]            Tel: [phone]      CULTURE                                       ------------------                                   >100,000 cfu/ml Escherichia coli      SUSCEPTIBILITY                                   ------------------ Escherichia coli  METHOD                 SANA  -------------------------------------  -------------------------  AMPICILLIN ($$)             <=8 00 ug/ml Susceptible  AZTREONAM ($$$)             <=8 ug/ml   Susceptible  CEFAZOLIN ($)              <=8 00 ug/ml Susceptible  CIPROFLOXACIN ($)            <=1 00 ug/ml Susceptible  GENTAMICIN ($$)             <=4 ug/ml   Susceptible  LEVOFLOXACIN ($)            <=2 00 ug/ml Susceptible  NITROFURANTOIN             >64 ug/ml   Resistant  PIPERACILLIN + TAZOBACTAM ($$$)     <=16 ug/ml  Susceptible  TETRACYCLINE              <=4 ug/ml   Susceptible  TOBRAMYCIN ($)             <=4 ug/ml   Susceptible  TRIMETHOPRIM + SULFAMETHOXAZOLE ($$$)  <=2/38 ug/ml Susceptible       Signatures   Electronically signed by : KRYSTEN Diggs ; Mar 23 2017 12:22PM EST                       (Author)

## 2018-01-10 NOTE — MISCELLANEOUS
Assessment    1  Acute upper respiratory infection (465 9) (J06 9)   2  Impacted cerumen of right ear (380 4) (H61 21)   3  Edema of lower extremity (782 3) (R60 0)    Plan  Acute upper respiratory infection    · Benzonatate 100 MG Oral Capsule (Tessalon Perles); TAKE 1 CAPSULE 3 TIMES  DAILY AS NEEDED   Rx By: Reyna Martinez; Dispense: 10 Days ; #:30 Capsule; Refill: 0; For: Acute upper respiratory infection; RADHA = N; Sent To: [email or web address] [address]   · Fluticasone Propionate 50 MCG/ACT Nasal Suspension; USE 1 SPRAY IN EACH  NOSTRIL TWICE DAILY   Rx By: Reyna Martinez; Dispense: 30 Days ; #:1 X 16 GM Bottle; Refill: 0; For: Acute upper respiratory infection; RADHA = N; Sent To: [facility]  [address]    Discussion/Summary  Discussion Summary:   Viral URI- tessalon perles, trial of Fluticasone  Call if not improving  LE edema in the summer- elevate legs, low salt diet  Counseling Documentation With Imm: The patient, patient's family was counseled regarding diagnostic results, impressions  Medication SE Review and Pt Understands Tx: Possible side effects of new medications were reviewed with the patient/guardian today  The treatment plan was reviewed with the patient/guardian  The patient/guardian understands and agrees with the treatment plan      Chief Complaint  Chief Complaint Free Text Note Form: TCM   Chief Complaint Chronic Condition [facility]: Patient is here today for follow up of chronic conditions described in HPI  History of Present Illness  TCM Communication [facility]: The patient is being contacted for follow-up after hospitalization  Hospital records were reviewed  She was hospitalized at [address] [address]  The date of admission: 05/31/2017, date of discharge: 06/03/2017  Diagnosis: UTI, acute bronchitis  She was discharged to home  Medications were not reviewed today  She scheduled a follow up appointment  The patient is currently asymptomatic  Counseling was provided to the patient  Communication performed and completed by Rabia Francis   HPI: Here with her daughter  Had a bad cough, fever and was seen in the ER  Admitted 5/31-6/3  CXR normal, WBC normal; +rhinovirus  Urine colonized with Ecoli  Taking Tessalon Perles , Mucinex and a probiotic  + post nasal drip  Cough is better  Denies SOB       Review of Systems  Complete-Female:   Constitutional: no fever and no chills  ENT: ear fullness, but no sore throat    The patient presents with complaints of bilateral earache  Cardiovascular: lower extremity edema and when it is humid, gets LE edema  Respiratory: as noted in HPI  Gastrointestinal: no abdominal pain, no constipation and no diarrhea  Genitourinary: no dysuria  Active Problems    1  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)   2  Allergic rhinitis (477 9) (J30 9)   3  Benign paroxysmal positional vertigo (386 11) (H81 10)   4  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)   5  Dyslipidemia (272 4) (E78 5)   6  History of Dyspnea (786 09) (R06 00)   7  Easy bruisability (782 9) (R23 8)   8  Encounter for screening mammogram for breast cancer (V76 12) (Z12 31)   9  Esophageal reflux (530 81) (K21 9)   10  Flu vaccine need (V04 81) (Z23)   11  Generalized osteoarthritis (715 00) (M15 9)   12  Left ventricular hypertrophy by electrocardiogram (429 3) (I51 7)   13  Need for pneumococcal vaccine (V03 82) (Z23)   14  Need for vaccination with 13-polyvalent pneumococcal conjugate vaccine (V03 82) (Z23)   15  Nonspecific abnormal results of function study of pulmonary system (794 2) (R94 2)   16  Osteoporosis (733 00) (M81 0)   17  Right knee pain (719 46) (M25 561)   18  S/P PTCA (percutaneous transluminal coronary angioplasty) (V45 82) (Z98 61)   19  S/P right knee surgery (V45 89) (Z98 890)   20  Screening for colon cancer (V76 51) (Z12 11)   21  Screening for genitourinary condition (V81 6) (Z13 89)   22  Screening for neurological condition (V80 09) (Z13 89)   23  Thromboembolic disorder (629 0) (I74 9)   24  Urgency of urination (788 63) (R39 15)    Past Medical History    1  History of Arthritis (V13 4)   2  History of C  difficile diarrhea (008 45) (A04 7)   3  History of Cough (786 2) (R05)   4  History of Dyspnea (786 09) (R06 00)   5  History of allergy (V15 09) (Z88 9)   6  History of backache (V13 59) (Z87 39)   7  History of chest pain (V13 89) (Z87 898)   8  History of esophageal reflux (V12 79) (Z87 19)   9  History of hyperlipidemia (V12 29) (Z86 39)   10  History of hypertension (V12 59) (Z86 79)   11  History of nausea (V12 79) (Z87 898)   12  History of urinary tract infection (V13 02) (Z87 440)   13  History of Preop examination (V72 84) (Z01 818)   14  History of Preop pulmonary/respiratory exam (V72 82) (Z01 811)    Surgical History    1  History of Appendectomy   2  History of Cath Stent Placement   3  History of Diagnostic Cystoscopy   4  History of Hernia Repair   5  History of Hip Replacement   6  History of Knee Surgery   7  History of Shoulder Surgery  Surgical History Reviewed: The surgical history was reviewed and updated today  Family History  Mother    1  Family history of Breast Cancer (V16 3)   2  Family history of leukemia (V16 6) (Z80 6)   3  Family history of Gout (V18 19)   4  Family history of Hypertension (V17 49)  Father    5  Family history of Bone Cancer  Sister    6  Family history of Cervical Cancer  Family History    7  Family history of Aneurysm   8  Family history of myocardial infarction (V17 3) (Z82 49)   9  Family history of rheumatoid arthritis (V17 7) (Z82 61)  Family History Reviewed: The family history was reviewed and updated today  Social History    · Caffeine use (V49 89) (F15 90)   · Completed 8th grade   ·    · Four children   · Never A Smoker   · No caffeine use   · No drug use   · Retired   · Social alcohol use (Z78 9)  Social History Reviewed: The social history was reviewed and updated today  Current Meds   1  Aspirin 81 MG TABS; TAKE 1 TABLET DAILY; Therapy: (Recorded:18Jun2015) to Recorded   2  Centrum Silver Oral Tablet; TAKE 1 TABLET DAILY; Therapy: (Recorded:07Apr2017) to Recorded   3  Citracal/Vitamin D TABS; Take 1 tablet twice daily; Therapy: (Recorded:18Jun2015) to Recorded   4  Famotidine 20 MG Oral Tablet; take 1 tablet by mouth twice daily; Therapy: 91RJG8996 to (Evaluate:15Nov2017)  Requested for: 10AOG2819; Last   Rx:68Vue6834 Ordered   5  Famotidine 20 MG Oral Tablet; take 1 tablet twice a day; Therapy: 13OQF2765 to (Evaluate:02Apr2018) Recorded   6  Niacin ER (Antihyperlipidemic) 1000 MG Oral Tablet Extended Release; TAKE 1 TABLET   BY MOUTH EVERY NIGHT AT BEDTIME; Therapy: 28QXZ3301 to (0488 90 45 88)  Requested for: 47DSN0358; Last   SH:96JQY1026 Ordered   7  Nitrostat 0 4 MG Sublingual Tablet Sublingual; place 1 tablet under the tongue every 5   minutes for up to 3 doses as needed for chest pain  call 911 if pain persists; Therapy: 82QTN1413 to (Evaluate:27Jun2016)  Requested for: 82NCF3325; Last   Rx:29Mar2016 Ordered   8  Pantoprazole Sodium 40 MG Oral Tablet Delayed Release; take 1 tablet by mouth every   day; Therapy: 35QNE5420 to (0488 90 45 88)  Requested for: 04Apr2017; Last   Rx:04Apr2017 Ordered   9  Probiotic 250 MG Oral Capsule; TAKE 1 CAPSULE TWICE DAILY FOR 30 DAYS; Therapy: (Recorded:11Nov2016) to Recorded   10  Simvastatin 40 MG Oral Tablet; take 1 tablet by mouth daily; Therapy: 64MNK6166 to (Evaluate:06Jun2017)  Requested for: 53SZU8673; Last    Rx:31Orx2087 Ordered   11  Tylenol Arthritis Pain 650 MG TBCR; TAKE 2 TABLET IN THE MORNING AND 1 TABLET     IN THE EVENING PRN; Therapy: (Recorded:18Jun2015) to Recorded   12  Vitamin D3 1000 UNIT Oral Tablet; Take 1 tablet daily; Therapy: (Recorded:18Jun2015) to Recorded  Medication List Reviewed: The medication list was reviewed and updated today  Allergies    1   Cephalexin TABS   2  Cipro TABS   3  Claritin TABS   4  Dexamethasone TABS   5  Erythromycin Base TABS   6  Erythromycin TABS   7  Influenza Virus Vaccine INJ   8  Iodine Solution SOLN   9  Loratadine TABS   10  Macrobid CAPS   11  Penicillins   12  Percocet TABS   13  Pneumococcal Vaccines   14  PriLOSEC OTC TBEC    Physical Exam    Constitutional   General appearance: No acute distress, well appearing and well nourished  Eyes   Conjunctiva and lids: No swelling, erythema or discharge  Ears, Nose, Mouth, and Throat   Otoscopic examination: Abnormal   The right external canal had a cerumen impaction  The left external canal was normal    Oropharynx: Normal with no erythema, edema, exudate or lesions  Pulmonary   Respiratory effort: Abnormal   Respiratory Findings: wet cough  Auscultation of lungs: Clear to auscultation  Cardiovascular   Auscultation of heart: Normal rate and rhythm, normal S1 and S2, without murmurs  Examination of extremities for edema and/or varicosities: Abnormal   bilateral ankle 1+ pitting edema and bilateral pretibial 1+ pitting edema  Abdomen   Abdomen: Non-tender, no masses  Musculoskeletal   Gait and station: Normal     Psychiatric   Orientation to person, place, and time: Normal     Mood and affect: Normal          Procedure            Procedure: cerumen removal    Indication: tympanic membrane(s) could not be visualized in the right ear  Prep: hydrogen peroxide was placed in the canal prior to the procedure  Procedure Note: The procedure was performed by the Provider  A otoscope was placed in the ear canal(s) to visualize the ear canal debris  The ear was cleaned by using warm water irrigation  The procedure was successful  Post-Procedure:   Patient Status: the patient tolerated the procedure well  Complications: there were no complications  Patient instructions: avoid using q-tips              Future Appointments    Date/Time Provider Specialty Site   07/20/2017 09:00 BEN Hartman MD Neurology [facility] NEUROLOGY [facility]   10/09/2017 02:30 PM KRYSTEN Pond   Internal Medicine [facility]     Signatures   Electronically signed by : Satinder Sandoval, ; Jun 5 2017  9:47AM EST                       (Author)    Electronically signed by : KRYSTEN Chavarria ; Jun 12 2017 11:32AM EST                       (Author)

## 2018-01-12 NOTE — RESULT NOTES
Message   Please call her that her dexa scan is showing low bone mass but the values are not in the osteoporosis range  I am still waiting for the report of her last dexa from [facility] so we can try to compare it once I receive it to see if it actually improved  Verified Results  * DXA BONE DENSITY SPINE HIP AND PELVIS 08Apr2016 10:42AM Gurinder Joshi Order Number: [id number]     Test Name Result Flag Reference   DXA BONE DENSITY SPINE HIP AND PELVIS (Report)     CENTRAL DXA SCAN     CLINICAL HISTORY:  [de-identified]year old post-menopausal  female risk factors include scoliosis  Gastroesophageal reflux with Prilosec use  Bilateral hip replacements  Osteoporosis screening  TECHNIQUE: Bone densitometry was performed using a Hologic Horizon A  bone densitometer  Regions of interest appear properly placed  There are no obvious fractures or other confounding variables which could limit the study  Degenerative changes lumbar   spine  Mild scoliotic curvature  COMPARISON: None  RESULTS:    LUMBAR SPINE: L1-L4:   BMD 1 0 70 gm/cm2   T-score 0 2   Z-score 2 9     LEFT FOREARM :   BMD 0 542 gm/sq-cm,   T-score is -2 4   Z-score is 0 8               ASSESSMENT:   1  Based on the WHO classification, the T-score of -2 4 in the left forearm is consistent with low bone mineral density  2  FRAX not reported because of bilateral hip replacements  3  Any secondary causes of low bone mineral density should be excluded prior to treatment, if clinically indicated  4  A daily intake of at least 1200 mg calcium and 800 - 1000 IU of Vitamin D, as well as weight bearing and muscle strengthening exercise, fall prevention and avoidance of tobacco and excessive alcohol intake as basic preventive measures are suggested                       WHO CLASSIFICATION:   Normal (a T-score of -1 0 or higher)   Low bone mineral density (a T-score of less than -1 0 but higher than -2 5)   Osteoporosis (a T-score of -2 5 or less)   Severe osteoporosis (a T-score of -2 5 or less with a fragility fracture)             Workstation performed: [id number]       Signatures   Electronically signed by : KRYSTEN Renteria ; Apr 21 2016  5:32PM EST                       (Author)

## 2018-01-13 NOTE — PROGRESS NOTES
History of Present Illness  Care Coordination Encounter Information:   Type of Encounter: Telephonic   Contact: Initial Contact   Last Office Visit: 2016   Spoke to Patient  Care Coordination  Nurse [address] [place] [address] [address]:   The reason for call is to discuss outreach for follow up/needed services  outreach telephone call to patient for care coordination assessment  Patient is ARIELLE from hospital stay at [facility] from 5/31-6/3 for UTI and bronchitis  Patient reports today that coughing continues  Patient describes cough as dry, hacking  Patient states she has SOB with activity  Patient also must use 2 pillows at night to breathe easily  Patient reports appetite is poor to fair only  Patient denies pain, N/V, headache, fever, diarrhea, constipation  Patient denies any dizziness, lightheadedness  No s/sx of vertigo  Patient has scheduled her F/u PCP apt for 6/12  Patient instructed on s/sx of respiratory distress  Patient verbalized understanding  Patient verbalizing understanding of and compliance with medication schedule  Role of CC and contact information given to patient  Active Problems    1  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)   2  Allergic rhinitis (477 9) (J30 9)   3  Benign paroxysmal positional vertigo (386 11) (H81 10)   4  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)   5  Dyslipidemia (272 4) (E78 5)   6  History of Dyspnea (786 09) (R06 00)   7  Easy bruisability (782 9) (R23 8)   8  Encounter for screening mammogram for breast cancer (V76 12) (Z12 31)   9  Esophageal reflux (530 81) (K21 9)   10  Flu vaccine need (V04 81) (Z23)   11  Generalized osteoarthritis (715 00) (M15 9)   12  Left ventricular hypertrophy by electrocardiogram (429 3) (I51 7)   13  Need for pneumococcal vaccine (V03 82) (Z23)   14  Need for vaccination with 13-polyvalent pneumococcal conjugate vaccine (V03 82) (Z23)   15  Nonspecific abnormal results of function study of pulmonary system (794 2) (R94 2)   16  Osteoporosis (733 00) (M81 0)   17  Right knee pain (719 46) (M25 561)   18  S/P PTCA (percutaneous transluminal coronary angioplasty) (V45 82) (Z98 61)   19  S/P right knee surgery (V45 89) (Z98 890)   20  Screening for colon cancer (V76 51) (Z12 11)   21  Screening for genitourinary condition (V81 6) (Z13 89)   22  Screening for neurological condition (V80 09) (Z13 89)   23  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)   24  Urgency of urination (788 63) (R39 15)    Past Medical History    1  History of Arthritis (V13 4)   2  History of C  difficile diarrhea (008 45) (A04 7)   3  History of Cough (786 2) (R05)   4  History of Dyspnea (786 09) (R06 00)   5  History of allergy (V15 09) (Z88 9)   6  History of backache (V13 59) (Z87 39)   7  History of chest pain (V13 89) (Z87 898)   8  History of esophageal reflux (V12 79) (Z87 19)   9  History of hyperlipidemia (V12 29) (Z86 39)   10  History of hypertension (V12 59) (Z86 79)   11  History of nausea (V12 79) (Z87 898)   12  History of urinary tract infection (V13 02) (Z87 440)   13  History of Preop examination (V72 84) (Z01 818)   14  History of Preop pulmonary/respiratory exam (V72 82) (Z01 811)    Surgical History    1  History of Appendectomy   2  History of Cath Stent Placement   3  History of Diagnostic Cystoscopy   4  History of Hernia Repair   5  History of Hip Replacement   6  History of Knee Surgery   7  History of Shoulder Surgery    Family History  Mother    1  Family history of Breast Cancer (V16 3)   2  Family history of leukemia (V16 6) (Z80 6)   3  Family history of Gout (V18 19)   4  Family history of Hypertension (V17 49)  Father    5  Family history of Bone Cancer  Sister    6  Family history of Cervical Cancer  Family History    7  Family history of Aneurysm   8  Family history of myocardial infarction (V17 3) (Z82 49)   9   Family history of rheumatoid arthritis (V17 7) (Z82 61)    Social History    · Caffeine use (V49 89) (F15 90)   · Completed 8th grade   ·    · Four children   · Never A Smoker   · No caffeine use   · No drug use   · Retired   · Social alcohol use (Z78 9)    Current Meds    1  Aspirin 81 MG TABS; TAKE 1 TABLET DAILY; Therapy: (Recorded:18Jun2015) to Recorded    2  Niacin ER (Antihyperlipidemic) 1000 MG Oral Tablet Extended Release; TAKE 1 TABLET   BY MOUTH EVERY NIGHT AT BEDTIME; Therapy: 64JFY8299 to (77 873 135)  Requested for: 32NWE2192; Last   VY:65NRB1793 Ordered   3  Simvastatin 40 MG Oral Tablet; take 1 tablet by mouth daily; Therapy: 10FWW1102 to (Evaluate:06Jun2017)  Requested for: 60PCM0349; Last   Rx:64Atz0999 Ordered    4  Famotidine 20 MG Oral Tablet; take 1 tablet by mouth twice daily; Therapy: 40TXA7943 to (Evaluate:64Bde4544)  Requested for: 16EPH1802; Last   Rx:48Pfj0208 Ordered   5  Pantoprazole Sodium 40 MG Oral Tablet Delayed Release; take 1 tablet by mouth every   day; Therapy: 15LUS0901 to ([facility])  Requested for: 04Apr2017; Last   Rx:04Apr2017 Ordered    6  Tylenol Arthritis Pain 650 MG TBCR; TAKE 2 TABLET IN THE MORNING AND 1 TABLET    IN THE EVENING PRN; Therapy: (Recorded:18Jun2015) to Recorded    7  Centrum Silver Oral Tablet; TAKE 1 TABLET DAILY; Therapy: (Recorded:07Apr2017) to Recorded   8  Citracal/Vitamin D TABS; Take 1 tablet twice daily; Therapy: (Recorded:18Jun2015) to Recorded   9  Probiotic 250 MG Oral Capsule; TAKE 1 CAPSULE TWICE DAILY FOR 30 DAYS; Therapy: (Recorded:11Nov2016) to Recorded   10  Vitamin D3 1000 UNIT Oral Tablet; Take 1 tablet daily; Therapy: (Recorded:18Jun2015) to Recorded    11  Nitrostat 0 4 MG Sublingual Tablet Sublingual (Nitroglycerin); place 1 tablet under the    tongue every 5 minutes for up to 3 doses as needed for chest pain  call    911 if pain persists; Therapy: 57HRP0398 to (Evaluate:27Jun2016)  Requested for: 49IPS0931; Last    Rx:29Mar2016 Ordered    12  Famotidine 20 MG Oral Tablet; take 1 tablet twice a day;     Therapy: 07Apr2017 to (Evaluate:02Apr2018) Recorded    Allergies    1  Cephalexin TABS   2  Cipro TABS   3  Claritin TABS   4  Dexamethasone TABS   5  Erythromycin Base TABS   6  Erythromycin TABS   7  Influenza Virus Vaccine INJ   8  Iodine Solution SOLN   9  Loratadine TABS   10  Macrobid CAPS   11  Penicillins   12  Percocet TABS   13  Pneumococcal Vaccines   14  PriLOSEC OTC TBEC    End of Encounter Meds    1  Aspirin 81 MG TABS; TAKE 1 TABLET DAILY; Therapy: (Recorded:18Jun2015) to Recorded    2  Niacin ER (Antihyperlipidemic) 1000 MG Oral Tablet Extended Release; TAKE 1 TABLET   BY MOUTH EVERY NIGHT AT BEDTIME; Therapy: 47FGR1032 to ()  Requested for: 23SEN2284; Last   DI:36ORV9724 Ordered   3  Simvastatin 40 MG Oral Tablet; take 1 tablet by mouth daily; Therapy: 39SWS2537 to (Evaluate:06Jun2017)  Requested for: 77DSZ3972; Last   Rx:33Bog6685 Ordered    4  Famotidine 20 MG Oral Tablet; take 1 tablet by mouth twice daily; Therapy: 86EKR2784 to (Evaluate:06Fdq9157)  Requested for: 35KMC0230; Last   Rx:39Tet5510 Ordered   5  Pantoprazole Sodium 40 MG Oral Tablet Delayed Release; take 1 tablet by mouth every   day; Therapy: 55EZM0273 to ()  Requested for: 04Apr2017; Last   Rx:74Unb1120 Ordered    6  Tylenol Arthritis Pain 650 MG TBCR; TAKE 2 TABLET IN THE MORNING AND 1 TABLET    IN THE EVENING PRN; Therapy: (Recorded:18Jun2015) to Recorded    7  Centrum Silver Oral Tablet; TAKE 1 TABLET DAILY; Therapy: (Recorded:07Apr2017) to Recorded   8  Citracal/Vitamin D TABS; Take 1 tablet twice daily; Therapy: (Recorded:18Jun2015) to Recorded   9  Probiotic 250 MG Oral Capsule; TAKE 1 CAPSULE TWICE DAILY FOR 30 DAYS; Therapy: (Recorded:11Nov2016) to Recorded   10  Vitamin D3 1000 UNIT Oral Tablet; Take 1 tablet daily; Therapy: (Recorded:18Jun2015) to Recorded    11   Nitrostat 0 4 MG Sublingual Tablet Sublingual (Nitroglycerin); place 1 tablet under the    tongue every 5 minutes for up to 3 doses as needed for chest pain  call    911 if pain persists; Therapy: 26VYB6418 to (Evaluate:27Jun2016)  Requested for: 00JFI5628; Last    Rx:29Mar2016 Ordered    12  Famotidine 20 MG Oral Tablet; take 1 tablet twice a day; Therapy: 58SWN5712 to (Evaluate:02Apr2018) Recorded    Future Appointments    Date/Time Provider Specialty Site   07/20/2017 09:00 AM Merrill Ross MD Neurology [address] [place] [address] NEUROLOGY [facility]   06/12/2017 11:00 AM KRYSTEN Haile  Internal Medicine [facility]   10/09/2017 02:30 PM KRYSTEN Haile  Internal [address]     Patient Care Team    Care Team Member Role Specialty Office Number   Vania Pabon [facility]  Pulmonary Medicine [phone]   Harry KENYON    Cardiology [phone]   [facility]  Certified Clinical Nurse Specialist [phone]   Georgette Xavier MD  Urology [phone]   [address] [phone] [address] [facility] [phone]     Signatures   Electronically signed by : Joelle Chance RN; Jun 8 2017  2:15PM EST                       (Author)

## 2018-01-14 VITALS
HEART RATE: 61 BPM | OXYGEN SATURATION: 95 % | WEIGHT: 172 LBS | DIASTOLIC BLOOD PRESSURE: 72 MMHG | BODY MASS INDEX: 31.97 KG/M2 | SYSTOLIC BLOOD PRESSURE: 132 MMHG

## 2018-01-14 VITALS
SYSTOLIC BLOOD PRESSURE: 122 MMHG | WEIGHT: 173.13 LBS | BODY MASS INDEX: 31.86 KG/M2 | HEIGHT: 62 IN | HEART RATE: 98 BPM | TEMPERATURE: 99 F | DIASTOLIC BLOOD PRESSURE: 76 MMHG | OXYGEN SATURATION: 99 %

## 2018-01-15 VITALS
WEIGHT: 174.13 LBS | DIASTOLIC BLOOD PRESSURE: 78 MMHG | HEIGHT: 62 IN | OXYGEN SATURATION: 93 % | HEART RATE: 77 BPM | BODY MASS INDEX: 32.04 KG/M2 | SYSTOLIC BLOOD PRESSURE: 152 MMHG

## 2018-01-15 NOTE — PROGRESS NOTES
Assessment    1  Encounter for preventive health examination (V70 0) (Z00 00)    Plan  2-vessel coronary artery disease, Dyslipidemia, Osteoporosis    · (1) CBC/PLT/DIFF; Status:Active; Requested for:07Apr2017;    · (1) COMPREHENSIVE METABOLIC PANEL; Status:Active; Requested for:07Apr2017;    · (1) LIPID PANEL FASTING W DIRECT LDL REFLEX; Status:Active; Requested  for:07Apr2017;    · (1) VITAMIN D 25-HYDROXY; Status:Active; Requested for:07Apr2017;   Screening for colon cancer    · (1) Arabella Daily; Status:Active; Requested for:07Apr2017;   cont  : I authorize [address] to obtain reimbursement for      Cologuard and to directly contact and collect a second sample from the paient      if reportable results are not obtained from the initial sample   cont  : I accept responsibility for maintaining the privacy of test results and      related information as required by HIPAA   : By ordering Cologuard, I certify that I am a licensed medical professional      authorized to order Cologuard  I acknowledge that the test is medically necessary and      that the patient is eligible to use Cologuard  Discussion/Summary    Cont current meds  She will be moving in with her son but will continue to see me  RTO 6months  Impression: Initial Annual Wellness Visit, with preventive exam as well as age and risk appropriate counseling completed  Cardiovascular screening and counseling: screening is current  Diabetes screening and counseling: screening is current  Colorectal cancer screening and counseling: Cologuard; declines colonoscopy and Dx - V76 51 Screen for CRC  Breast cancer screening and counseling: screening is current  Cervical cancer screening and counseling: screening not indicated     Osteoporosis screening and counseling: screening is current, counseling was given on obtaining adequate amounts of calcium and vitamin D on a daily basis and counseling was given on the importance of regular weightbearing exercise  Abdominal aortic aneurysm screening and counseling: screening not indicated  Glaucoma screening and counseling: screening is current  Immunizations: the patient declines the influenza vaccination, the patient declines the pneumococcal vaccination and the patient declines the Zostavax vaccine  Advance Directive Planning: paperwork and instructions were given to the patient  Patient Discussion: plan discussed with the patient, follow-up visit needed in 6months  Possible side effects of new medications were reviewed with the patient/guardian today  The treatment plan was reviewed with the patient/guardian  The patient/guardian understands and agrees with the treatment plan      Chief Complaint  Wellness      History of Present Illness  Welcome to Medicare and Wellness Visits: The patient is being seen for the initial annual wellness visit  Medicare Screening and Risk Factors   Hospitalizations: she has been previously hospitalizied  Once per lifetime medicare screening tests: ECG has not been done  Medicare Screening Tests Risk Questions   Abdominal aortic aneurysm risk assessment: none indicated  Osteoporosis risk assessment: , female gender and over 48years of age  HIV risk assessment: none indicated  Drug and Alcohol Use: The patient has never smoked cigarettes  The patient reports never drinking alcohol  Alcohol concern:   The patient has no concerns about alcohol abuse  She has never used illicit drugs  Diet and Physical Activity: Current diet includes well balanced meals and low salt food choices  The patient does not exercise  Mood Disorder and Cognitive Impairment Screening:   Depression screening  negative for symptoms  She denies feeling down, depressed, or hopeless over the past two weeks  She denies feeling little interest or pleasure in doing things over the past two weeks     Cognitive impairment screening: denies difficulty learning/retaining new information, denies difficulty handling complex tasks, denies difficulty with reasoning, denies difficulty with language and denies difficulty with behavior  Functional Ability/Level of Safety: Hearing is normal bilaterally and a hearing aid is not used  The patient is currently able to drive with limitations, but able to do activities of daily living without limitations, able to do instrumental activities of daily living without limitations and able to participate in social activities without limitations  Activities of daily living details: does not need help using the phone, no transportation help needed, does not need help shopping, no meal preparation help needed, does not need help doing housework, does not need help doing laundry, does not need help managing medications and does not need help managing money  Fall risk factors:  antihypertensive use, but The patient fell 0 times in the past 12 months , no polypharmacy, no antidepressant use, no sedative use and no previous fall  Home safety risk factors:  no handrails on the stairs, but no unfamiliar surroundings, no loose rugs, no poor household lighting, no uneven floors, no household clutter and grab bars in the bathroom  Advance Directives: Advance directives: 5 Wishes was given to patient today, but no living will  Co-Managers and Medical Equipment/Suppliers: See Patient Care Team      Patient Care Team    Care Team Member Role Specialty Office Number   Avinash Esparza [place]  Pulmonary Medicine [phone]   Caitlin KENYON  Cardiology [phone]   Kyle Elise MD  Urology [phone]   [facility] [address] [phone]   [facility]  Obstetrics/Gynecology [phone]     Review of Systems    Constitutional: no fever and no chills  Cardiovascular: no chest pain and no palpitations  Respiratory: no shortness of breath and no cough  Gastrointestinal: no abdominal pain, no diarrhea and no constipation     Genitourinary: urinary frequency    The patient presents with complaints of mild dysuria  Symptoms are improving  Active Problems    1  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)   2  Allergic rhinitis (477 9) (J30 9)   3  Benign paroxysmal positional vertigo (386 11) (H81 10)   4  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)   5  Dyslipidemia (272 4) (E78 5)   6  History of Dyspnea (786 09) (R06 00)   7  Easy bruisability (782 9) (R23 8)   8  Encounter for screening mammogram for breast cancer (V76 12) (Z12 31)   9  Esophageal reflux (530 81) (K21 9)   10  Flu vaccine need (V04 81) (Z23)   11  Generalized osteoarthritis (715 00) (M15 9)   12  Left ventricular hypertrophy by electrocardiogram (429 3) (I51 7)   13  Need for pneumococcal vaccine (V03 82) (Z23)   14  Need for vaccination with 13-polyvalent pneumococcal conjugate vaccine (V03 82) (Z23)   15  Nonspecific abnormal results of function study of pulmonary system (794 2) (R94 2)   16  Osteoporosis (733 00) (M81 0)   17  Right knee pain (719 46) (M25 561)   18  S/P PTCA (percutaneous transluminal coronary angioplasty) (V45 82) (Z98 61)   19  S/P right knee surgery (V45 89) (Z98 890)   20  Screening for genitourinary condition (V81 6) (Z13 89)   21  Screening for neurological condition (V80 09) (Z13 89)   22  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)   23   Urgency of urination (788 63) (R39 15)    Past Medical History    · History of Arthritis (V13 4)   · History of C  difficile diarrhea (008 45) (A04 7)   · History of Cough (786 2) (R05)   · History of Dyspnea (786 09) (R06 00)   · History of allergy (V15 09) (Z88 9)   · History of backache (V13 59) (Z87 39)   · History of chest pain (V13 89) (K84 664)   · History of esophageal reflux (V12 79) (Z87 19)   · History of hyperlipidemia (V12 29) (Z86 39)   · History of hypertension (V12 59) (Z86 79)   · History of nausea (V12 79) (Z20 400)   · History of urinary tract infection (V13 02) (Z87 467)   · History of Preop examination (V72 84) (Z01 818)   · History of Preop pulmonary/respiratory exam (V72 82) (Z01 811)    The active problems and past medical history were reviewed and updated today  Surgical History    · History of Appendectomy   · History of Cath Stent Placement   · History of Diagnostic Cystoscopy   · History of Hernia Repair   · History of Hip Replacement   · History of Knee Surgery   · History of Shoulder Surgery    The surgical history was reviewed and updated today  Family History  Mother    · Family history of Breast Cancer (V16 3)   · Family history of leukemia (V16 6) (Z80 6)   · Family history of Gout (V18 19)   · Family history of Hypertension (V17 49)  Father    · Family history of Bone Cancer  Sister    · Family history of Cervical Cancer  Family History    · Family history of Aneurysm   · Family history of myocardial infarction (V17 3) (Z82 49)   · Family history of rheumatoid arthritis (V17 7) (Z82 61)    The family history was reviewed and updated today  Social History    · Caffeine use (V49 89) (F15 90)   · Completed 8th grade   ·    · Four children   · Never A Smoker   · No caffeine use   · No drug use   · Retired   · Social alcohol use (Z78 9)  The social history was reviewed and updated today  Current Meds   1  Aspirin 81 MG TABS; TAKE 1 TABLET DAILY; Therapy: (Recorded:18Jun2015) to Recorded   2  Citracal/Vitamin D TABS; Take 1 tablet twice daily; Therapy: (Recorded:18Jun2015) to Recorded   3  Niacin ER (Antihyperlipidemic) 1000 MG Oral Tablet Extended Release; TAKE 1 TABLET   BY MOUTH EVERY NIGHT AT BEDTIME; Therapy: 79ZHP7293 to (Georgia Causey)  Requested for: 05Apr2017; Last   Rx:04Apr2017; Status: ACTIVE - Renewal Voided Ordered   4  Nitrostat 0 4 MG Sublingual Tablet Sublingual; place 1 tablet under the tongue every 5   minutes for up to 3 doses as needed for chest pain  call 911 if pain persists;    Therapy: 49ZTX9519 to (Evaluate:27Jun2016)  Requested for: 29XGF0562; Last   Rx:29Mar2016 Ordered   5  Pantoprazole Sodium 40 MG Oral Tablet Delayed Release; take 1 tablet by mouth every   day; Therapy: 17DRE8965 to (Ling Dietrich)  Requested for: 04Apr2017; Last   Rx:04Apr2017 Ordered   6  Probiotic 250 MG Oral Capsule; TAKE 1 CAPSULE TWICE DAILY FOR 30 DAYS; Therapy: (Recorded:11Nov2016) to Recorded   7  Simvastatin 40 MG Oral Tablet; take 1 tablet by mouth daily; Therapy: 01NVT9177 to (Evaluate:06Jun2017)  Requested for: 62KSK6517; Last   Rx:04Ijh1071 Ordered   8  Tylenol Arthritis Pain 650 MG TBCR; TAKE 2 TABLET IN THE MORNING AND 1 TABLET    IN THE EVENING PRN; Therapy: (Recorded:18Jun2015) to Recorded   9  Vitamin D3 1000 UNIT Oral Tablet; Take 1 tablet daily; Therapy: (Recorded:18Jun2015) to Recorded    The medication list was reviewed and updated today  Allergies    1  Cephalexin TABS   2  Cipro TABS   3  Claritin TABS   4  Dexamethasone TABS   5  Erythromycin Base TABS   6  Erythromycin TABS   7  Influenza Virus Vaccine INJ   8  Iodine Solution SOLN   9  Loratadine TABS   10  Macrobid CAPS   11  Penicillins   12  Percocet TABS   13  Pneumococcal Vaccines   14  PriLOSEC OTC TBEC    Immunizations   1    Influenza  Temporarily Deferred: Pt refuses    PCV  Temporarily Deferred: Pt refuses    PPSV  Temporarily Deferred: Pt refuses     Vitals  Signs    Heart Rate: 78  Systolic: 965  Diastolic: 76  Height: 5 ft 1 5 in  Weight: 167 lb 8 oz  BMI Calculated: 31 14  BSA Calculated: 1 76  O2 Saturation: 98    Physical Exam    Constitutional   General appearance: No acute distress, well appearing and well nourished  Head and Face   Head and face: Normal     Eyes   Conjunctiva and lids: No swelling, erythema or discharge  Ears, Nose, Mouth, and Throat   Otoscopic examination: Tympanic membranes translucent with normal light reflex  Canals patent without erythema  Oropharynx: Normal with no erythema, edema, exudate or lesions      Neck   Neck: Supple, symmetric, trachea midline, no masses  Thyroid: Normal, no thyromegaly  Pulmonary   Respiratory effort: No increased work of breathing or signs of respiratory distress  Auscultation of lungs: Clear to auscultation  Cardiovascular   Auscultation of heart: Normal rate and rhythm, normal S1 and S2, no murmurs  Examination of extremities for edema and/or varicosities: Normal     Abdomen   Abdomen: Non-tender, no masses  Liver and spleen: No hepatomegaly or splenomegaly  Lymphatic   Palpation of lymph nodes in neck: No lymphadenopathy  Musculoskeletal   Gait and station: Normal     Psychiatric   Orientation to person, place, and time: Normal     Mood and affect: Normal        Future Appointments    Date/Time Provider Specialty Site   07/20/2017 09:00 AM Donald Monreal MD Neurology [facility] NEUROLOGY [facility]   10/09/2017 02:30 PM KRYSTEN Argueta   Internal Medicine [facility]     Signatures   Electronically signed by : KRYSTEN Israel ; Apr 9 2017  8:32PM EST                       (Author)

## 2018-01-15 NOTE — RESULT NOTES
Verified Results  (1) LIPID PANEL, FASTING [id number] 10:17AM Kathi [place] Order Number: [id number]  TW Order Number: [id number] Order Number: [id number]     Test Name Result Flag Reference   CHOLESTEROL 154 mg/dL     HDL,DIRECT 59 mg/dL  40-60   Specimen collection should occur prior to Metamizole administration due to the potential for falsely depressed results  LDL CHOLESTEROL CALCULATED 69 mg/dL  0-100   Triglyceride:         Normal              <150 mg/dl       Borderline High    150-199 mg/dl       High               200-499 mg/dl       Very High          >499 mg/dl  Cholesterol:         Desirable        <200 mg/dl      Borderline High  200-239 mg/dl      High             >239 mg/dl  HDL Cholesterol:        High    >59 mg/dL      Low     <41 mg/dL  LDL CALCULATED:    This screening LDL is a calculated result  It does not have the accuracy of the Direct Measured LDL in the monitoring of patients with hyperlipidemia and/or statin therapy  Direct Measure LDL (XLY213) must be ordered separately in these patients  TRIGLYCERIDES 131 mg/dL  <=150   Specimen collection should occur prior to N-Acetylcysteine or Metamizole administration due to the potential for falsely depressed results  (1) COMPREHENSIVE METABOLIC PANEL [id number] 22:03PQ Kathi Ling Order Number: [id number]   Order Number: [id number] Order Number: [id number]     Test Name Result Flag Reference   GLUCOSE,RANDM 100 mg/dL     If the patient is fasting, the ADA then defines impaired fasting glucose as > 100 mg/dL and diabetes as > or equal to 123 mg/dL     SODIUM 144 mmol/L  136-145   POTASSIUM 3 8 mmol/L  3 5-5 3   CHLORIDE 107 mmol/L  100-108   CARBON DIOXIDE 29 mmol/L  21-32   ANION GAP (CALC) 8 mmol/L  4-13   BLOOD UREA NITROGEN 8 mg/dL  5-25   CREATININE 0 84 mg/dL  0 60-1 30   Standardized to IDMS reference method   CALCIUM 9 0 mg/dL  8 3-10 1   BILI, TOTAL 0 40 mg/dL  0 20-1 00   ALK PHOSPHATAS 91 U/L    ALT (SGPT) 18 U/L  12-78   AST(SGOT) 22 U/L  5-45   ALBUMIN 3 6 g/dL  3 5-5 0   TOTAL PROTEIN 6 5 g/dL  6 4-8 2   eGFR Non-African American      >60 0 ml/min/1 73sq krysten Grijalva [employer] Disease Education Program recommendations are as follows:  GFR calculation is accurate only with a steady state creatinine  Chronic Kidney disease less than 60 ml/min/1 73 sq  meters  Kidney failure less than 15 ml/min/1 73 sq  meters  Discussion/Summary   YOUR CHOLESTEROL IS NORMAL NOW AND YOUR SUGAR, KIDNEY, LIVER TESTS ARE NORMAL        Signatures   Electronically signed by : KRYSTEN Ledbetter ; Jul 9 2016  7:13AM EST                       (Author)

## 2018-01-15 NOTE — RESULT NOTES
Verified Results  (1) CBC/PLT/DIFF [id number] 09:10AM Zander Chiner Order Number: [id number]     Test Name Result Flag Reference   WBC COUNT 6 66 Thousand/uL  4 31-10 16   RBC COUNT 3 66 Million/uL L 3 81-5 12   HEMOGLOBIN 11 7 g/dL  11 5-15 4   HEMATOCRIT 37 2 %  34 8-46  1    fL H 82-98   MCH 32 0 pg  26 8-34 3   MCHC 31 5 g/dL  31 4-37 4   RDW 15 0 %  11 6-15 1   MPV 8 6 fL L 8 9-12 7   PLATELET COUNT 950 Thousands/uL  149-390   NEUTROPHILS RELATIVE PERCENT 50 %  43-75   LYMPHOCYTES RELATIVE PERCENT 37 %  14-44   MONOCYTES RELATIVE PERCENT 10 %  4-12   EOSINOPHILS RELATIVE PERCENT 2 %  0-6   BASOPHILS RELATIVE PERCENT 1 %  0-1   NEUTROPHILS ABSOLUTE COUNT 3 41 Thousands/? ??L  1 85-7 62   LYMPHOCYTES ABSOLUTE COUNT 2 46 Thousands/? ??L  0 60-4 47   MONOCYTES ABSOLUTE COUNT 0 65 Thousand/? ??L  0 17-1 22   EOSINOPHILS ABSOLUTE COUNT 0 11 Thousand/? ??L  0 00-0 61   BASOPHILS ABSOLUTE COUNT 0 03 Thousands/? ??L  0 00-0 10   - Patient Instructions: This bloodwork is non-fasting  Please drink two glasses of water morning of bloodwork  (1) COMPREHENSIVE METABOLIC PANEL [id number] 61:48HC Achilles Dugway Order Number: [id number]     Test Name Result Flag Reference   SODIUM 145 mmol/L  136-145   POTASSIUM 3 6 mmol/L  3 5-5 3   CHLORIDE 108 mmol/L  100-108   CARBON DIOXIDE 30 mmol/L  21-32   ANION GAP (CALC) 7 mmol/L  4-13   BLOOD UREA NITROGEN 17 mg/dL  5-25   CREATININE 0 91 mg/dL  0 60-1 30   Standardized to IDMS reference method   CALCIUM 8 9 mg/dL  8 3-10 1   BILI, TOTAL 0 60 mg/dL  0 20-1 00   ALK PHOSPHATAS 71 U/L     ALT (SGPT) 21 U/L  12-78   AST(SGOT) 26 U/L  5-45   ALBUMIN 3 3 g/dL L 3 5-5 0   TOTAL PROTEIN 6 2 g/dL L 6 4-8 2   eGFR Non-African American 59 3 ml/min/1 73sq m     National Kidney Disease Education Program recommendations are as follows:  GFR calculation is accurate only with a steady state creatinine  Chronic Kidney disease less than 60 ml/min/1 73 sq  meters  Kidney failure less than 15 ml/min/1 73 sq  meters  GLUCOSE FASTING 93 mg/dL  65-99     (1) LIPID PANEL FASTING W DIRECT LDL REFLEX [id number] 09:10AM Jing Esparza Order Number: [id number]     Test Name Result Flag Reference   CHOLESTEROL 182 mg/dL     LDL CHOLESTEROL CALCULATED 94 mg/dL  0-100   - Patient Instructions: This is a fasting blood test  Water, black tea or black coffee only after 9:00pm the night before test   Drink 2 glasses of water the morning of test       Triglyceride:         Normal              <150 mg/dl       Borderline High    150-199 mg/dl       High               200-499 mg/dl       Very High          >499 mg/dl  Cholesterol:         Desirable        <200 mg/dl      Borderline High  200-239 mg/dl      High             >239 mg/dl  HDL Cholesterol:        High    >59 mg/dL      Low     <41 mg/dL  LDL Cholesterol:        Optimal          <100 mg/dl        Near Optimal     100-129 mg/dl        Above Optimal          Borderline High   130-159 mg/dl          High              160-189 mg/dl          Very High        >189 mg/dl  LDL CALCULATED:    This screening LDL is a calculated result  It does not have the accuracy of the Direct Measured LDL in the monitoring of patients with hyperlipidemia and/or statin therapy  Direct Measure LDL (NXC761) must be ordered separately in these patients  TRIGLYCERIDES 82 mg/dL  <=150   Specimen collection should occur prior to N-Acetylcysteine or Metamizole administration due to the potential for falsely depressed results  HDL,DIRECT 72 mg/dL H 40-60   Specimen collection should occur prior to Metamizole administration due to the potential for falsely depressed results       (1) VITAMIN D 25-HYDROXY [id number] 09:10AM Aliza Frias    Order Number: [id number]     Test Name Result Flag Reference   VIT D 25-HYDROX 38 0 ng/mL  30 0-100 0   This assay is a certified procedure of the CDC Vitamin D Standardization Certification Program (VDSCP)     Deficiency <20ng/ml   Insufficiency 20-30ng/ml   Sufficient  ng/ml     *Patients undergoing fluorescein dye angiography may retain small amounts of fluorescein in the body for 48-72 hours post procedure  Samples containing fluorescein can produce falsely elevated Vitamin D values  If the patient had this procedure, a specimen should be resubmitted post fluorescein clearance  Discussion/Summary   Winnie Nesbitt, Your blood tests look fine        Signatures   Electronically signed by : KRYSTEN Sena ; May  8 2017 12:54PM EST                       (Author)

## 2018-01-16 NOTE — PROGRESS NOTES
History of Present Illness  Care Coordination Encounter Information:   Type of Encounter: Telephonic   Contact: Follow-Up   Last Office Visit: 6/12/17   Spoke to Patient  Care Coordination SL Nurse [facility]:   The reason for call is to discuss outreach for follow up/needed services  outreach TC to patient for care coordination assessment  Patient reports her URI from June completely resolved  patient started coughing again and is having difficulty with her voice x 2 days  Instructed patient that if symptoms continue or worsen patient is to call PCP and set up an appointment  Patient denies any SOB,fever, chest pain or pressure  Patient feels these symptoms may be related to allergies  patient reminded to call PCP with any new symptoms except acute distress  Patient verbalized understanding  Patient reports understanding of and compliance with medication schedule  Active Problems    1  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)   2  Acute upper respiratory infection (465 9) (J06 9)   3  Allergic rhinitis (477 9) (J30 9)   4  Benign paroxysmal positional vertigo (386 11) (H81 10)   5  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)   6  Dyslipidemia (272 4) (E78 5)   7  History of Dyspnea (786 09) (R06 00)   8  Easy bruisability (782 9) (R23 8)   9  Edema of lower extremity (782 3) (R60 0)   10  Encounter for screening mammogram for breast cancer (V76 12) (Z12 31)   11  Esophageal reflux (530 81) (K21 9)   12  Flu vaccine need (V04 81) (Z23)   13  Generalized osteoarthritis (715 00) (M15 9)   14  Impacted cerumen of right ear (380 4) (H61 21)   15  Left ventricular hypertrophy by electrocardiogram (429 3) (I51 7)   16  Need for pneumococcal vaccine (V03 82) (Z23)   17  Need for vaccination with 13-polyvalent pneumococcal conjugate vaccine (V03 82) (Z23)   18  Nonspecific abnormal results of function study of pulmonary system (794 2) (R94 2)   19  Osteoporosis (733 00) (M81 0)   20   Right knee pain (719 46) (M25 561)   21  S/P PTCA (percutaneous transluminal coronary angioplasty) (V45 82) (Z98 61)   22  S/P right knee surgery (V45 89) (Z98 890)   23  Screening for colon cancer (V76 51) (Z12 11)   24  Screening for genitourinary condition (V81 6) (Z13 89)   25  Screening for neurological condition (V80 09) (Z13 89)   26  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)   27  Urgency of urination (788 63) (R39 15)    Past Medical History    1  History of Arthritis (V13 4)   2  History of C  difficile diarrhea (008 45) (A04 7)   3  History of Cough (786 2) (R05)   4  History of Dyspnea (786 09) (R06 00)   5  History of allergy (V15 09) (Z88 9)   6  History of backache (V13 59) (Z87 39)   7  History of chest pain (V13 89) (Z87 898)   8  History of esophageal reflux (V12 79) (Z87 19)   9  History of hyperlipidemia (V12 29) (Z86 39)   10  History of hypertension (V12 59) (Z86 79)   11  History of nausea (V12 79) (Z87 898)   12  History of urinary tract infection (V13 02) (Z87 440)   13  History of Preop examination (V72 84) (Z01 818)   14  History of Preop pulmonary/respiratory exam (V72 82) (Z01 811)    Surgical History    1  History of Appendectomy   2  History of Cath Stent Placement   3  History of Diagnostic Cystoscopy   4  History of Hernia Repair   5  History of Hip Replacement   6  History of Knee Surgery   7  History of Shoulder Surgery    Family History  Mother    1  Family history of Breast Cancer (V16 3)   2  Family history of leukemia (V16 6) (Z80 6)   3  Family history of Gout (V18 19)   4  Family history of Hypertension (V17 49)  Father    5  Family history of Bone Cancer  Sister    6  Family history of Cervical Cancer  Family History    7  Family history of Aneurysm   8  Family history of myocardial infarction (V17 3) (Z82 49)   9   Family history of rheumatoid arthritis (V17 7) (Z82 61)    Social History    · Caffeine use (V49 89) (F15 90)   · Completed 8th grade   ·    · Four children   · Never A Smoker   · No caffeine use   · No drug use   · Retired   · Social alcohol use (Z78 9)    Current Meds    1  Aspirin 81 MG TABS; TAKE 1 TABLET DAILY; Therapy: (Recorded:18Jun2015) to Recorded    2  Benzonatate 100 MG Oral Capsule (Tessalon Perles); TAKE 1 CAPSULE 3 TIMES DAILY   AS NEEDED; Therapy: 64ONM1292 to (Evaluate:22Jun2017)  Requested for: 12Jun2017; Last   Rx:12Jun2017 Ordered   3  Fluticasone Propionate 50 MCG/ACT Nasal Suspension; SHAKE LIQUID AND USE 1   SPRAY IN EACH NOSTRIL TWICE DAILY; Therapy: 23NPT6707 to (Evaluate:18Gjc4560)  Requested for: 08TMM9722; Last   Rx:85Gih3164 Ordered    4  Niacin ER (Antihyperlipidemic) 1000 MG Oral Tablet Extended Release; TAKE 1 TABLET   BY MOUTH EVERY NIGHT AT BEDTIME; Therapy: 49LSI1100 to (96 103177)  Requested for: 09ZHY2349; Last   FN:12IWK5535 Ordered   5  Simvastatin 40 MG Oral Tablet; take 1 tablet by mouth daily; Therapy: 17SRJ0656 to (Evaluate:01Hlh3415)  Requested for: 21ZCK9041; Last   WP:86BGY8296 Ordered    6  Famotidine 20 MG Oral Tablet; take 1 tablet by mouth twice daily; Therapy: 29VDP3381 to (Evaluate:31Elh1496)  Requested for: 53PTM8352; Last   Rx:77Toi8094 Ordered   7  Pantoprazole Sodium 40 MG Oral Tablet Delayed Release; take 1 tablet by mouth every   day; Therapy: 74MVZ6782 to (566 324 313)  Requested for: 04Apr2017; Last   Rx:04Apr2017 Ordered    8  Tylenol Arthritis Pain 650 MG TBCR; TAKE 2 TABLET IN THE MORNING AND 1 TABLET    IN THE EVENING PRN; Therapy: (Recorded:18Jun2015) to Recorded    9  Centrum Silver Oral Tablet; TAKE 1 TABLET DAILY; Therapy: (Recorded:07Apr2017) to Recorded   10  Citracal/Vitamin D TABS; Take 1 tablet twice daily; Therapy: (Recorded:18Jun2015) to Recorded   11  Probiotic 250 MG Oral Capsule; TAKE 1 CAPSULE TWICE DAILY FOR 30 DAYS; Therapy: (Recorded:11Nov2016) to Recorded   12  Vitamin D3 1000 UNIT Oral Tablet; Take 1 tablet daily; Therapy: (Recorded:18Jun2015) to Recorded    13  Nitrostat 0 4 MG Sublingual Tablet Sublingual (Nitroglycerin); place 1 tablet under the    tongue every 5 minutes for up to 3 doses as needed for chest pain  call    911 if pain persists; Therapy: 49GEM2206 to (Evaluate:2016)  Requested for: 83EHB1474; Last    Rx:2016 Ordered    14  Famotidine 20 MG Oral Tablet; take 1 tablet twice a day; Therapy: 84GSV9815 to (Evaluate:2018) Recorded    Allergies    1  Cephalexin TABS   2  Cipro TABS   3  Claritin TABS   4  Dexamethasone TABS   5  Erythromycin Base TABS   6  Erythromycin TABS   7  Influenza Virus Vaccine INJ   8  Iodine Solution SOLN   9  Loratadine TABS   10  Macrobid CAPS   11  Penicillins   12  Percocet TABS   13  Pneumococcal Vaccines   14  PriLOSEC OTC TBEC    End of Encounter Meds    1  Aspirin 81 MG TABS; TAKE 1 TABLET DAILY; Therapy: (Recorded:16Wgo4244) to Recorded    2  Benzonatate 100 MG Oral Capsule (Tessalon Perles); TAKE 1 CAPSULE 3 TIMES DAILY   AS NEEDED; Therapy: 41OTI3976 to (Evaluate:31Apk7541)  Requested for: 2017; Last   Rx:2017 Ordered   3  Fluticasone Propionate 50 MCG/ACT Nasal Suspension; SHAKE LIQUID AND USE 1   SPRAY IN EACH NOSTRIL TWICE DAILY; Therapy: 24KFP4341 to (Evaluate:27Qyt8559)  Requested for: 74WBL6180; Last   Rx:37Zjx7334 Ordered    4  Niacin ER (Antihyperlipidemic) 1000 MG Oral Tablet Extended Release; TAKE 1 TABLET   BY MOUTH EVERY NIGHT AT BEDTIME; Therapy: 76YIX9226 to (96 507731)  Requested for: 63HAD0675; Last   B04UQZ6583 Ordered   5  Simvastatin 40 MG Oral Tablet; take 1 tablet by mouth daily; Therapy: 30KVJ7166 to (Evaluate:55Hls7557)  Requested for: 80RYW0612; Last   OP:47PYM8356 Ordered    6  Famotidine 20 MG Oral Tablet; take 1 tablet by mouth twice daily; Therapy: 08LDU9467 to (Evaluate:37Xuc8028)  Requested for: 77RRH0492; Last   Rx:74Rjn9725 Ordered   7  Pantoprazole Sodium 40 MG Oral Tablet Delayed Release; take 1 tablet by mouth every   day;    Therapy: 08RFR8330 to (754 6277)  Requested for: 04Apr2017; Last   Rx:04Apr2017 Ordered    8  Tylenol Arthritis Pain 650 MG TBCR; TAKE 2 TABLET IN THE MORNING AND 1 TABLET    IN THE EVENING PRN; Therapy: (Recorded:18Jun2015) to Recorded    9  Centrum Silver Oral Tablet; TAKE 1 TABLET DAILY; Therapy: (Recorded:06Axb0149) to Recorded   10  Citracal/Vitamin D TABS; Take 1 tablet twice daily; Therapy: (Recorded:18Jun2015) to Recorded   11  Probiotic 250 MG Oral Capsule; TAKE 1 CAPSULE TWICE DAILY FOR 30 DAYS; Therapy: (Recorded:85Cek8698) to Recorded   12  Vitamin D3 1000 UNIT Oral Tablet; Take 1 tablet daily; Therapy: (Recorded:18Jun2015) to Recorded    13  Nitrostat 0 4 MG Sublingual Tablet Sublingual (Nitroglycerin); place 1 tablet under the    tongue every 5 minutes for up to 3 doses as needed for chest pain  call    911 if pain persists; Therapy: 93API0014 to (Evaluate:27Jun2016)  Requested for: 14FLP8294; Last    Rx:29Mar2016 Ordered    14  Famotidine 20 MG Oral Tablet; take 1 tablet twice a day; Therapy: 75SYS2186 to (Evaluate:02Apr2018) Recorded    Future Appointments    Date/Time Provider Specialty Site   07/20/2017 09:00 AM Jb Tripathi MD Neurology [facility]   07/26/2017 08:15 AM Luba Olszewski, [facility] Neurology [facility]   10/09/2017 02:30 PM KRYSTEN Huerta  Internal [address]     Patient Care Team    Care Team Member Role Specialty Office Number   Scharlene Cowarts [address] [facility]  Pulmonary Medicine [phone]   Peggy KENYON    Cardiology [phone]   [facility] [address]  Certified Clinical Nurse Specialist [phone]   Unique Ortega MD  Urology [phone]   [address] [phone] [address] [facility] [phone]     Signatures   Electronically signed by : Yonas Briscoe RN; Jul 18 2017  3:48PM EST                       (Author)

## 2018-01-17 NOTE — PROGRESS NOTES
History of Present Illness  Care Coordination Encounter Information:   Type of Encounter: Telephonic   Contact: Follow-Up    Spoke to Patient  Care Coordination SL Nurse [facility]:   The reason for call is to discuss outreach for follow up/needed services  outreach TC to patient for CM closure  patient states she is at her baseline and is capable of self care at this time  patient is in agreement with closure  Patient states independence with home medications, s/sx of worsening disease process and how/when to notify CM/PCP with new/worsening symptoms  Active Problems    1  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)   2  Acute upper respiratory infection (465 9) (J06 9)   3  Allergic rhinitis (477 9) (J30 9)   4  Benign paroxysmal positional vertigo (386 11) (H81 10)   5  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)   6  Dyslipidemia (272 4) (E78 5)   7  History of Dyspnea (786 09) (R06 00)   8  Easy bruisability (782 9) (R23 8)   9  Edema of lower extremity (782 3) (R60 0)   10  Encounter for screening mammogram for breast cancer (V76 12) (Z12 31)   11  Esophageal reflux (530 81) (K21 9)   12  Flu vaccine need (V04 81) (Z23)   13  Generalized osteoarthritis (715 00) (M15 9)   14  Impacted cerumen of right ear (380 4) (H61 21)   15  Left ventricular hypertrophy by electrocardiogram (429 3) (I51 7)   16  Need for pneumococcal vaccine (V03 82) (Z23)   17  Need for vaccination with 13-polyvalent pneumococcal conjugate vaccine (V03 82) (Z23)   18  Nonspecific abnormal results of function study of pulmonary system (794 2) (R94 2)   19  Osteoporosis (733 00) (M81 0)   20  Right knee pain (719 46) (M25 561)   21  S/P PTCA (percutaneous transluminal coronary angioplasty) (V45 82) (Z98 61)   22  S/P right knee surgery (V45 89) (Z98 890)   23  Screening for colon cancer (V76 51) (Z12 11)   24  Screening for genitourinary condition (V81 6) (Z13 89)   25  Screening for neurological condition (V80 09) (Z13 89)   26  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)   27  Urgency of urination (788 63) (R39 15)    Past Medical History    1  History of Arthritis (V13 4)   2  History of C  difficile diarrhea (008 45) (A04 7)   3  History of Cough (786 2) (R05)   4  History of Dyspnea (786 09) (R06 00)   5  History of allergy (V15 09) (Z88 9)   6  History of backache (V13 59) (Z87 39)   7  History of chest pain (V13 89) (Z87 898)   8  History of esophageal reflux (V12 79) (Z87 19)   9  History of hyperlipidemia (V12 29) (Z86 39)   10  History of hypertension (V12 59) (Z86 79)   11  History of nausea (V12 79) (Z87 898)   12  History of urinary tract infection (V13 02) (Z87 440)   13  History of Preop examination (V72 84) (Z01 818)   14  History of Preop pulmonary/respiratory exam (V72 82) (Z01 811)    Surgical History    1  History of Appendectomy   2  History of Cath Stent Placement   3  History of Diagnostic Cystoscopy   4  History of Hernia Repair   5  History of Hip Replacement   6  History of Knee Surgery   7  History of Shoulder Surgery    Family History  Mother    1  Family history of Breast Cancer (V16 3)   2  Family history of leukemia (V16 6) (Z80 6)   3  Family history of Gout (V18 19)   4  Family history of Hypertension (V17 49)  Father    5  Family history of Bone Cancer  Sister    6  Family history of Cervical Cancer  Family History    7  Family history of Aneurysm   8  Family history of myocardial infarction (V17 3) (Z82 49)   9  Family history of rheumatoid arthritis (V17 7) (Z82 61)    Social History    · Caffeine use (V49 89) (F15 90)   · Completed 8th grade   ·    · Four children   · Never A Smoker   · No caffeine use   · No drug use   · Retired   · Social alcohol use (Z78 9)    Current Meds    1  Aspirin 81 MG TABS; TAKE 1 TABLET DAILY; Therapy: (Recorded:18Jun2015) to Recorded    2  Benzonatate 100 MG Oral Capsule (Tessalon Perles); TAKE 1 CAPSULE 3 TIMES DAILY   AS NEEDED;    Therapy: 77NQV8164 to (OIOBVOJM:44LOJ8457)  Requested for: 12Jun2017; Last   Rx:17Zlq4495 Ordered   3  Fluticasone Propionate 50 MCG/ACT Nasal Suspension; SHAKE LIQUID AND USE 1   SPRAY IN EACH NOSTRIL TWICE DAILY; Therapy: 69BOK7057 to (Evaluate:82Fjc4558)  Requested for: 81BFD8573; Last   Rx:44Axz3743 Ordered    4  Niacin ER (Antihyperlipidemic) 1000 MG Oral Tablet Extended Release; TAKE 1 TABLET   BY MOUTH EVERY NIGHT AT BEDTIME; Therapy: 94DAF1200 to ()  Requested for: 56KUV3348; Last   QT:19FRA6946 Ordered   5  Simvastatin 40 MG Oral Tablet; take 1 tablet by mouth daily; Therapy: 18DNT4057 to (Evaluate:86Sxk8076)  Requested for: 51MHF2074; Last   YT:86DOL6593 Ordered    6  Famotidine 20 MG Oral Tablet; take 1 tablet by mouth twice daily; Therapy: 72FPZ9870 to (Evaluate:47Hcz0674)  Requested for: 46NNU9944; Last   Rx:73Mcm9982 Ordered   7  Pantoprazole Sodium 40 MG Oral Tablet Delayed Release; take 1 tablet by mouth every   day; Therapy: 02FOS5794 to (Fiona Navarro)  Requested for: 04Apr2017; Last   Rx:89Ifh0308 Ordered    8  Tylenol Arthritis Pain 650 MG TBCR; TAKE 2 TABLET IN THE MORNING AND 1 TABLET    IN THE EVENING PRN; Therapy: (Recorded:18Jun2015) to Recorded    9  Centrum Silver Oral Tablet; TAKE 1 TABLET DAILY; Therapy: (Recorded:45Bry7818) to Recorded   10  Citracal/Vitamin D TABS; Take 1 tablet twice daily; Therapy: (Recorded:18Jun2015) to Recorded   11  Probiotic 250 MG Oral Capsule; TAKE 1 CAPSULE TWICE DAILY FOR 30 DAYS; Therapy: (Recorded:11Nov2016) to Recorded   12  Vitamin D3 1000 UNIT Oral Tablet; Take 1 tablet daily; Therapy: (Recorded:18Jun2015) to Recorded    13  Nitrostat 0 4 MG Sublingual Tablet Sublingual (Nitroglycerin); place 1 tablet under the    tongue every 5 minutes for up to 3 doses as needed for chest pain  call    911 if pain persists; Therapy: 31NTQ6554 to (Evaluate:27Jun2016)  Requested for: 10EMG9617; Last    Rx:29Mar2016 Ordered    14   Famotidine 20 MG Oral Tablet; take 1 tablet twice a day; Therapy: 98NZT1136 to (Evaluate:02Apr2018) Recorded    Allergies    1  Cephalexin TABS   2  Cipro TABS   3  Claritin TABS   4  Dexamethasone TABS   5  Erythromycin Base TABS   6  Erythromycin TABS   7  Influenza Virus Vaccine INJ   8  Iodine Solution SOLN   9  Loratadine TABS   10  Macrobid CAPS   11  Penicillins   12  Percocet TABS   13  Pneumococcal Vaccines   14  PriLOSEC OTC TBEC    End of Encounter Meds    1  Aspirin 81 MG TABS; TAKE 1 TABLET DAILY; Therapy: (Recorded:18Jun2015) to Recorded    2  Benzonatate 100 MG Oral Capsule (Tessalon Perles); TAKE 1 CAPSULE 3 TIMES DAILY   AS NEEDED; Therapy: 39TKY8426 to (Evaluate:22Jun2017)  Requested for: 12Jun2017; Last   Rx:12Jun2017 Ordered   3  Fluticasone Propionate 50 MCG/ACT Nasal Suspension; SHAKE LIQUID AND USE 1   SPRAY IN EACH NOSTRIL TWICE DAILY; Therapy: 13BEN1443 to (Evaluate:30Rkp5584)  Requested for: 71TUX0507; Last   Rx:51Ffd4081 Ordered    4  Niacin ER (Antihyperlipidemic) 1000 MG Oral Tablet Extended Release; TAKE 1 TABLET   BY MOUTH EVERY NIGHT AT BEDTIME; Therapy: 79VXP5610 to (05 12 73 93 30)  Requested for: 70DWL1037; Last   VS:81SYA4103 Ordered   5  Simvastatin 40 MG Oral Tablet; take 1 tablet by mouth daily; Therapy: 44DKR3557 to (Evaluate:17Zbl5829)  Requested for: 46RYF6277; Last   VT:95ZTW8121 Ordered    6  Famotidine 20 MG Oral Tablet; take 1 tablet by mouth twice daily; Therapy: 06AWF6110 to (Evaluate:22Kwa9608)  Requested for: 96BRC1098; Last   Rx:12Ghy1918 Ordered   7  Pantoprazole Sodium 40 MG Oral Tablet Delayed Release; take 1 tablet by mouth every   day; Therapy: 00ZTD1576 to (05 12 73 93 30)  Requested for: 96Xhj7823; Last   Rx:80Oxw0396 Ordered    8  Tylenol Arthritis Pain 650 MG TBCR; TAKE 2 TABLET IN THE MORNING AND 1 TABLET    IN THE EVENING PRN; Therapy: (Recorded:18Jun2015) to Recorded    9  Centrum Silver Oral Tablet; TAKE 1 TABLET DAILY;    Therapy: (Recorded:07Apr2017) to Recorded   10  Citracal/Vitamin D TABS; Take 1 tablet twice daily; Therapy: (Recorded:18Jun2015) to Recorded   11  Probiotic 250 MG Oral Capsule; TAKE 1 CAPSULE TWICE DAILY FOR 30 DAYS; Therapy: (Recorded:11Nov2016) to Recorded   12  Vitamin D3 1000 UNIT Oral Tablet; Take 1 tablet daily; Therapy: (Recorded:18Jun2015) to Recorded    13  Nitrostat 0 4 MG Sublingual Tablet Sublingual (Nitroglycerin); place 1 tablet under the    tongue every 5 minutes for up to 3 doses as needed for chest pain  call    911 if pain persists; Therapy: 16DZI7419 to (Evaluate:27Jun2016)  Requested for: 83RVB5602; Last    Rx:29Mar2016 Ordered    14  Famotidine 20 MG Oral Tablet; take 1 tablet twice a day; Therapy: 58ISP4450 to (Evaluate:02Apr2018) Recorded    Future Appointments    Date/Time Provider Specialty Site   10/09/2017 02:30 PM Marylee Smack, M D  Internal [facility] [address] [place] [address]   11/20/2017 04:40 PM KRYSTEN Gibbs  Cardiology [address] [facility]     Patient Care Team    Care Team Member Role Specialty Office Number   [facility]  Pulmonary Medicine [phone]   Andrei KENYON    Cardiology [phone]   [facility] [address]  Certified Clinical Nurse Specialist [phone]   Mark Butcher MD  Urology [phone]   [place] [address]  Optometry [phone] [phone] [phone]     Signatures   Electronically signed by : Pablo Kahn RN; Sep 22 2017 11:29AM EST                       (Author)

## 2018-01-18 NOTE — MISCELLANEOUS
Assessment   1  C  difficile diarrhea (008 45) (A04 7)  2  Esophageal reflux (530 81) (K21 9)1   3  S/P right knee surgery (V45 89) (Z98 890)1      1 Amended By: Sachin Zuniga; Nov 01 2016 6:29 PM EST    Discussion/Summary  Discussion Summary:   She was nauseous in the office but did not vomit and symptoms resolved by the end of the visit  We reviewed the med list and her daughter will check what she is taking at home  Take probiotics, cont Kefir  She is back on her home meds, finished Flagyl   I asked her to report if her stools turn watery again   She has swelling in the right knee and in the right shin, ankle  She denies calf pain at this time- report increase in swelling or recurrence of calf pain  1    Counseling Documentation With Imm: The patient was counseled regarding impressions  Medication SE Review and Pt Understands Tx: Possible side effects of new medications were reviewed with the patient/guardian today  The treatment plan was reviewed with the patient/guardian  The patient/guardian understands and agrees with the treatment plan       1 Amended By: Sachin Zuniga; Nov 01 2016 6:30 PM EST    Chief Complaint  Chief Complaint Free Text Note Form: TCM      History of Present Illness  TCM Communication [facility]: The patient is being contacted for follow-up after hospitalization  Hospital records were reviewed  She was hospitalized at [facility]  The date of admission: 10/17/2016, date of discharge: 10/20/2016  Diagnosis: Clostridium difficile diarrhea  She was discharged to home  Medications were not reviewed today  She scheduled a follow up appointment  The patient is currently asymptomatic  Counseling was provided to the patient     Communication performed and completed by Sofie Baig   HPI: admitted 10/17-10/20 for severe diarrhea and was diagnosed with Cdiff  she had knee replacement on 9/23   was placed on Zpack and prednisone on 10/6 , denies URI symptoms then but was c/o neck pain  She has finished the Flagyl, experienced metallic taste from it  Stools have been getting more and more formed since  Dtg making her drink Kefir  She was nauseous when she arrived in the office which she is attributing to walking down 6 flights of stairs in her apartment because the elevator was not working  right knee swelling but improving ROM  calf pain the a few days ago, resolved  VNA coming twice a week, PT three times a week      Review of Systems  Complete-Female:   Constitutional: recent weight loss, but no fever and no chills  Cardiovascular: no chest pain and no palpitations  Respiratory: no shortness of breath and no cough  Gastrointestinal: as noted in HPI  Genitourinary: no dysuria  Musculoskeletal: as noted in HPI  Active Problems   1  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)  2  Allergic rhinitis (477 9) (J30 9)  3  Benign paroxysmal positional vertigo (386 11) (H81 10)  4  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)  5  Dyslipidemia (272 4) (E78 5)  6  History of Dyspnea (786 09) (R06 00)  7  Easy bruisability (782 9) (R23 8)  8  Encounter for screening mammogram for breast cancer (V76 12) (Z12 31)  9  Esophageal reflux (530 81) (K21 9)  10  Flu vaccine need (V04 81) (Z23)  11  Generalized osteoarthritis (715 00) (M15 9)  12  Left ventricular hypertrophy by electrocardiogram (429 3) (I51 7)  13  Need for pneumococcal vaccine (V03 82) (Z23)  14  Need for vaccination with 13-polyvalent pneumococcal conjugate vaccine (V03 82) (Z23)  15  Nonspecific abnormal results of function study of pulmonary system (794 2) (R94 2)  16  Osteoporosis (733 00) (M81 0)  17  Right knee pain (719 46) (M25 561)  18  S/P PTCA (percutaneous transluminal coronary angioplasty) (V45 82) (Z98 61)  19  Screening for genitourinary condition (V81 6) (Z13 89)  20  Screening for neurological condition (V80 09) (Z13 89)  21  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)  22   Urgency of urination (788 63) (R39 15)    Past Medical History   1  History of Arthritis (V13 4)  2  History of Cough (786 2) (R05)  3  History of Dyspnea (786 09) (R06 00)  4  History of allergy (V15 09) (Z88 9)  5  History of backache (V13 59) (Z87 39)  6  History of chest pain (V13 89) (Z87 898)  7  History of esophageal reflux (V12 79) (Z87 19)  8  History of hyperlipidemia (V12 29) (Z86 39)  9  History of hypertension (V12 59) (Z86 79)  10  History of urinary tract infection (V13 02) (Z87 440)  11  History of Preop examination (V72 84) (Z01 818)  12  History of Preop pulmonary/respiratory exam (V72 82) (Z01 811)    Surgical History   1  History of Appendectomy  2  History of Cath Stent Placement  3  History of Diagnostic Cystoscopy  4  History of Hernia Repair  5  History of Hip Replacement  6  History of Knee Surgery  7  History of Shoulder Surgery  Surgical History Reviewed: The surgical history was reviewed and updated today  Family History  Mother   1  Family history of Breast Cancer (V16 3)  2  Family history of leukemia (V16 6) (Z80 6)  3  Family history of Gout (V18 19)  4  Family history of Hypertension (V17 49)  Father   5  Family history of Bone Cancer  Sister   6  Family history of Cervical Cancer  Family History   7  Family history of Aneurysm  8  Family history of myocardial infarction (V17 3) (Z82 49)  9  Family history of rheumatoid arthritis (V17 7) (Z82 61)  Family History Reviewed: The family history was reviewed and updated today  Social History    · Caffeine use (V49 89) (F15 90)   · Completed 8th grade   ·    · Four children   · Never A Smoker   · No caffeine use   · No drug use   · Retired   · Social alcohol use (Z78 9)  Social History Reviewed: The social history was reviewed and updated today  Current Meds  1  Aspirin 81 MG TABS; TAKE 1 TABLET DAILY; Therapy: (Recorded:18Jun2015) to Recorded  2  Citracal/Vitamin D TABS; Take 1 tablet twice daily; Therapy: (Recorded:18Jun2015) to Recorded  3   Effient 10 MG Oral Tablet; Take 1 tablet daily; Therapy: 89RHS3570 to (Joel Preston)  Requested for: 40OFI9677; Last   Rx:44Kzy4717 Ordered  4  Famotidine 20 MG Oral Tablet; Take 1 tablet daily; Therapy: 40NPM2461 to  Requested for: 72Nmy4248 Recorded  5  Furosemide 40 MG Oral Tablet; TAKE TABLET  PRN; Therapy: (Recorded:18Jun2015) to Recorded  6  Niacin ER (Antihyperlipidemic) 1000 MG Oral Tablet Extended Release; take 1 tablet   daily at bedtime; Therapy: 26JSO0281 to (927 70 139)  Requested for: 81HHT9087; Last   Rx:34Opg4067 Ordered  7  Nitrostat 0 4 MG Sublingual Tablet Sublingual; place 1 tablet under the tongue every 5   minutes for up to 3 doses as needed for chest pain  call 911 if pain persists; Therapy: 96GXP5321 to (Evaluate:27Jun2016)  Requested for: 41GWU7377; Last   Rx:29Mar2016 Ordered  8  Pantoprazole Sodium 40 MG Oral Tablet Delayed Release; take 1 tablet once daily; Therapy: 02FJE9149 to (Evaluate:01Ytr2422)  Requested for: 67QJA2056; Last   Rx:57Cvz5951 Ordered  9  Simvastatin 40 MG Oral Tablet; Take 1 tablet daily; Therapy: 62IUQ7801 to (Evaluate:39Xhn1550)  Requested for: 43HYT0056; Last   Rx:88Eeb3250 Ordered  10  Tylenol Arthritis Pain 650 MG TBCR; TAKE 2 TABLET IN THE MORNING AND 1 TABLET     IN THE EVENING PRN; Therapy: (Recorded:18Jun2015) to Recorded  11  Vitamin D3 1000 UNIT Oral Tablet; Take 1 tablet daily; Therapy: (Recorded:18Jun2015) to Recorded  Medication List Reviewed: The medication list was reviewed and updated today  Allergies   1  Cephalexin TABS  2  Cipro TABS  3  Claritin TABS  4  Dexamethasone TABS  5  Erythromycin Base TABS  6  Erythromycin TABS  7  Influenza Virus Vaccine INJ  8  Iodine Solution SOLN  9  Loratadine TABS  10  Macrobid CAPS  11  Penicillins  12  Percocet TABS  13  Pneumococcal Vaccines  14  PriLOSEC OTC TBEC    Physical Exam    Constitutional   General appearance: No acute distress, well appearing and well nourished      Pulmonary Respiratory effort: No increased work of breathing or signs of respiratory distress  Auscultation of lungs: Clear to auscultation  Cardiovascular   Auscultation of heart: Normal rate and rhythm, normal S1 and S2, without murmurs  Examination of extremities for edema and/or varicosities: Abnormal   right pretibial 1+ pitting edema and right knee 1+ pitting edema, but no left pretibial edema and no left knee edema  Abdomen   Abdomen: Non-tender, no masses  Lymphatic   Palpation of lymph nodes in neck: No lymphadenopathy  Psychiatric   Orientation to person, place, and time: Normal     Mood and affect: Normal     Additional Exam:  no calf tenderness  Future Appointments    Date/Time Provider Specialty Site   07/20/2017 09:00 AM Ashlyn Stevens MD Neurology [facility] NEUROLOGY [facility]   11/25/2016 01:30 PM KRYSTEN Silveira  Internal [facility]   11/21/2016 03:40 PM KRYSTEN Ann   Cardiology [facility]     Signatures   Electronically signed by : Jonnathan Montoya, ; Oct 21 2016  2:17PM EST                       (Author)    Electronically signed by : KRYSTEN Lockwood ; Nov 1 2016  6:29PM EST                       (Author)    Electronically signed by : KRYSTEN Lockwood ; Nov 1 2016  6:31PM EST                       (Author)

## 2018-01-19 ENCOUNTER — ALLSCRIPTS OFFICE VISIT (OUTPATIENT)
Dept: OTHER | Facility: OTHER | Age: 83
End: 2018-01-19

## 2018-01-20 NOTE — PROGRESS NOTES
Assessment   1  Otitis media, right (382 9) (H66 91)   2  Acute upper respiratory infection (465 9) (J06 9)   3  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)   4  Dyslipidemia (272 4) (E78 5)    Plan   Advance directive discussed with patient    · We recommend that you create an advance directive ; Status:Complete - Retrospective    By Protocol Authorization;   Done: [id number]  Otitis media, right    · Doxycycline Hyclate 100 MG Oral Capsule; TAKE 1 CAPSULE TWICE DAILY WITH    MEALS  Screening for genitourinary condition    · *VB - Urinary Incontinence Screen (Dx Z13 89 Screen for UI); Status:Complete -    Retrospective By Protocol Authorization;   Done: [id number] 02:01PM    Discussion/Summary      She has multiple abx allergies start doxycycline to be taken with food oral fluids may cont the Robitussin rest of meds if not improving        The patient, patient's family was counseled regarding impressions  Possible side effects of new medications were reviewed with the patient/guardian today  The treatment plan was reviewed with the patient/guardian  The patient/guardian understands and agrees with the treatment plan      Chief Complaint   Patient presents today for chest congestion, head aches, post nasal drip, and right ear pain  History of Present Illness   HPI: 5 days of fever chills body aches ear pain and chest congestion dry heaves no diarrhea Robitussin          Review of Systems        Constitutional: as noted in HPI       ENT: as noted in HPI  Respiratory: as noted in HPI  Gastrointestinal: as noted in HPI  Active Problems   1  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)   2  Allergic rhinitis (477 9) (J30 9)   3  Benign paroxysmal positional vertigo (386 11) (H81 10)   4  Bursitis of left hip (726 5) (M70 72)   5  Chronic low back pain (724 2,338 29) (M54 5,G89 29)   6  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)   7  Dyslipidemia (272 4) (E78 5)   8   History of Dyspnea (786 09) (R06 00)   9  Easy bruisability (782 9) (R23 8)   10  Edema of lower extremity (782 3) (R60 0)   11  Encounter for screening mammogram for breast cancer (V76 12) (Z12 31)   12  Esophageal reflux (530 81) (K21 9)   13  Flu vaccine need (V04 81) (Z23)   14  Generalized osteoarthritis (715 00) (M15 9)   15  Left ventricular hypertrophy by electrocardiogram (429 3) (I51 7)   16  Need for pneumococcal vaccine (V03 82) (Z23)   17  Need for vaccination with 13-polyvalent pneumococcal conjugate vaccine (V03 82) (Z23)   18  Nonspecific abnormal results of function study of pulmonary system (794 2) (R94 2)   19  Osteoporosis (733 00) (M81 0)   20  Right knee pain (719 46) (M25 561)   21  S/P PTCA (percutaneous transluminal coronary angioplasty) (V45 82) (Z98 61)   22  S/P right knee surgery (V45 89) (Z98 890)   23  Screening for colon cancer (V76 51) (Z12 11)   24  Screening for genitourinary condition (V81 6) (Z13 89)   25  Screening for neurological condition (V80 09) (Z13 89)   26  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)   27  Urgency of urination (788 63) (R39 15)    Past Medical History   Active Problems And Past Medical History Reviewed: The active problems and past medical history were reviewed and updated today  Surgical History   Surgical History Reviewed: The surgical history was reviewed and updated today  Social History    · Caffeine use (V49 89) (F15 90)   · Completed 8th grade   ·    · Four children   · No caffeine use   · Non-smoker (V49 89) (Z78 9)   · Retired   · Social alcohol use (Z78 9)  The social history was reviewed and updated today  Family History   Family History Reviewed: The family history was reviewed and updated today  Current Meds    1  Aspirin 81 MG TABS; TAKE 1 TABLET DAILY; Therapy: (Recorded:18Jun2015) to Recorded   2  Centrum Silver Oral Tablet; TAKE 1 TABLET DAILY; Therapy: (Recorded:07Apr2017) to Recorded   3   Citracal/Vitamin D TABS; Take 1 tablet twice daily; Therapy: (Recorded:18Jun2015) to Recorded   4  Famotidine 20 MG Oral Tablet; take 1 tablet by mouth daily; Therapy: 34JEN2052 to (Evaluate:23Xvc0909)  Requested for: 13JFU5083; Last     Rx:14Jan2018 Ordered   5  Niacin ER (Antihyperlipidemic) 1000 MG Oral Tablet Extended Release; TAKE 1 TABLET     BY MOUTH EVERY NIGHT AT BEDTIME; Therapy: 46SGB1171 to (Carlos Isabel)  Requested for: 03SLS3773; Last     Rx:08Nov2017 Ordered   6  Nitroglycerin 0 4 MG Sublingual Tablet Sublingual; place 1 tablet under the tongue every     5 minutes for up to 3 doses as needed for chest pain  call 911 if pain     persists; Therapy: 36PTJ6071 to (Evaluate:07Jan2018)  Requested for: 11VRP2630; Last     Rx:09Oct2017 Ordered   7  Pantoprazole Sodium 40 MG Oral Tablet Delayed Release; take 1 tablet by mouth every     day; Therapy: 81PVV8555 to )  Requested for: 27Oct2017; Last     GK:97UUT9190 Ordered   8  Probiotic 250 MG Oral Capsule; TAKE 1 CAPSULE TWICE DAILY FOR 30 DAYS; Therapy: (Recorded:11Nov2016) to Recorded   9  Simvastatin 40 MG Oral Tablet; take 1 tablet by mouth daily; Therapy: 79ONZ0962 to (Evaluate:08Jun2018)  Requested for: 55AMU2538; Last     Rx:09Jan2018 Ordered   10  Tylenol Arthritis Pain 650 MG TBCR; TAKE 2 TABLET IN THE MORNING AND 1 TABLET       IN THE EVENING PRN; Therapy: (Recorded:18Jun2015) to Recorded   11  Vitamin D3 1000 UNIT Oral Tablet; Take 1 tablet daily; Therapy: (Recorded:18Jun2015) to Recorded     The medication list was reviewed and updated today  Allergies   1  Cephalexin TABS   2  Cipro TABS   3  Claritin TABS   4  Dexamethasone TABS   5  Erythromycin Base TABS   6  Erythromycin TABS   7  Influenza Virus Vaccine INJ   8  Iodine Solution SOLN   9  Loratadine TABS   10  Macrobid CAPS   11  Penicillins   12  Percocet TABS   13  Pneumococcal Vaccines   14   PriLOSEC OTC TBEC    Vitals    Recorded: [id number] 01: 57PM   Temperature 99 4 F   Heart Rate 92   Respiration 18   Systolic 664   Diastolic 80   Height 5 ft 1 5 in   Weight 169 lb 0 8 oz   BMI Calculated 31 42   BSA Calculated 1 77   O2 Saturation 94     Physical Exam        Constitutional      General appearance: No acute distress, well appearing and well nourished  Eyes      Conjunctiva and lids: No swelling, erythema or discharge  Ears, Nose, Mouth, and Throat      Otoscopic examination: Abnormal   The right tympanic membrane was red  The left tympanic membrane was normal  The right external canal was erythematous  The left external canal was normal       Oropharynx: Normal with no erythema, edema, exudate or lesions  Pulmonary      Respiratory effort: No increased work of breathing or signs of respiratory distress  Auscultation of lungs: Clear to auscultation  Cardiovascular      Auscultation of heart: Normal rate and rhythm, normal S1 and S2, without murmurs  Lymphatic      Palpation of lymph nodes in neck: No lymphadenopathy  Results/Data   PHQ-2 Adult Depression Screening 23DSR8497 02:01PM User, Luis      Test Name Result Flag Reference   PHQ-2 Adult Depression Score 0     Over the last two weeks, how often have you been bothered by any of the following problems? Little interest or pleasure in doing things: Not at all - 0     Feeling down, depressed, or hopeless: Not at all - 0   PHQ-2 Adult Depression Screening Negative        *VB - Urinary Incontinence Screen (Dx Z13 89 Screen for UI) 19ELG4533 02:01PM J Luis Agent      Test Name Result Flag Reference   Urinary Incontinence Assessment [id number]          Future Appointments      Date/Time Provider Specialty Site   04/09/2018 02:00 PM KRYSTEN Pena   [facility]     Signatures    Electronically signed by : KRYSTEN Gleason ; Jan 19 2018  6:03PM EST                       (Author)

## 2018-01-22 VITALS
HEART RATE: 78 BPM | BODY MASS INDEX: 30.82 KG/M2 | DIASTOLIC BLOOD PRESSURE: 76 MMHG | HEIGHT: 62 IN | OXYGEN SATURATION: 98 % | SYSTOLIC BLOOD PRESSURE: 112 MMHG | WEIGHT: 167.5 LBS

## 2018-01-22 VITALS
BODY MASS INDEX: 32.2 KG/M2 | DIASTOLIC BLOOD PRESSURE: 78 MMHG | HEIGHT: 62 IN | WEIGHT: 175 LBS | HEART RATE: 66 BPM | SYSTOLIC BLOOD PRESSURE: 140 MMHG

## 2018-01-23 VITALS
BODY MASS INDEX: 31.11 KG/M2 | SYSTOLIC BLOOD PRESSURE: 138 MMHG | OXYGEN SATURATION: 94 % | DIASTOLIC BLOOD PRESSURE: 80 MMHG | HEIGHT: 62 IN | RESPIRATION RATE: 18 BRPM | WEIGHT: 169.05 LBS | TEMPERATURE: 99.4 F | HEART RATE: 92 BPM

## 2018-01-26 ENCOUNTER — TELEPHONE (OUTPATIENT)
Dept: INTERNAL MEDICINE CLINIC | Facility: CLINIC | Age: 83
End: 2018-01-26

## 2018-01-26 NOTE — TELEPHONE ENCOUNTER
I spoke with the patient and she said that you gave her a medication that she thinks was called Dinomist?  She also wanted to know why Flonase when it is her ear that bothers her

## 2018-01-26 NOTE — TELEPHONE ENCOUNTER
The nasal spray decreases the mucus production in her sinuses which is what is causing the ear pressure  She can try the Flonase OTC

## 2018-02-02 ENCOUNTER — OFFICE VISIT (OUTPATIENT)
Dept: INTERNAL MEDICINE CLINIC | Facility: CLINIC | Age: 83
End: 2018-02-02
Payer: COMMERCIAL

## 2018-02-02 VITALS
RESPIRATION RATE: 18 BRPM | WEIGHT: 169.8 LBS | DIASTOLIC BLOOD PRESSURE: 80 MMHG | OXYGEN SATURATION: 96 % | BODY MASS INDEX: 32.06 KG/M2 | HEIGHT: 61 IN | TEMPERATURE: 97.7 F | HEART RATE: 83 BPM | SYSTOLIC BLOOD PRESSURE: 146 MMHG

## 2018-02-02 DIAGNOSIS — H60.501 ACUTE OTITIS EXTERNA OF RIGHT EAR, UNSPECIFIED TYPE: Primary | ICD-10-CM

## 2018-02-02 DIAGNOSIS — Z88.9 MULTIPLE ALLERGIES: ICD-10-CM

## 2018-02-02 PROBLEM — H66.90 ACUTE OTITIS MEDIA: Status: ACTIVE | Noted: 2018-02-02

## 2018-02-02 PROCEDURE — 99214 OFFICE O/P EST MOD 30 MIN: CPT | Performed by: INTERNAL MEDICINE

## 2018-02-02 RX ORDER — BIOTIN 1 MG
1 TABLET ORAL DAILY
COMMUNITY

## 2018-02-02 RX ORDER — PREDNISONE 10 MG/1
TABLET ORAL
Qty: 21 TABLET | Refills: 0 | Status: SHIPPED | OUTPATIENT
Start: 2018-02-02 | End: 2018-07-30 | Stop reason: ALTCHOICE

## 2018-02-02 RX ORDER — SENNOSIDES 8.6 MG
CAPSULE ORAL
COMMUNITY

## 2018-02-02 NOTE — PROGRESS NOTES
Assessment/Plan:      Diagnoses and all orders for this visit:    Acute otitis externa of right ear, unspecified type  -     predniSONE 10 mg tablet; 4 tabs daily for 3 days then 3 for 2 days then 2 for 1 day then 1 for 1 day  -     Ambulatory Referral to Otolaryngology; Future    Multiple allergies    Other orders  -     Multiple Vitamins-Minerals (CENTRUM ADULTS PO); Take 1 tablet by mouth daily  -     acetaminophen (TYLENOL ARTHRITIS PAIN) 650 mg CR tablet; Take by mouth  -     Cholecalciferol (VITAMIN D3) 1000 units CAPS; Take 1 tablet by mouth daily          Subjective:     Patient ID: Roxanne Peacock is a 80 y o  female  Patient was seen 2 weeks ago for 5 days of fever chills right ear pain URI symptoms nausea  She was placed on doxycycline She had some GI upset but finished the course  She c/o persistent right ear pain  Multiple abx allergies, allergy to antihistamines and dexamethasone  She gets injections to her hip without issues          Review of Systems   Constitutional: Negative for fever  HENT: Positive for ear pain  Negative for postnasal drip and rhinorrhea  Respiratory: Negative for cough  Gastrointestinal: Negative for diarrhea and vomiting  Objective:     Physical Exam   Constitutional: No distress  HENT:   Head: Normocephalic  Right Ear: There is tenderness  Tympanic membrane is erythematous  Left Ear: External ear normal    Mouth/Throat: Oropharynx is clear and moist    Eyes: Conjunctivae are normal    Cardiovascular: Normal rate, regular rhythm and normal heart sounds  No murmur heard  Pulmonary/Chest: Effort normal and breath sounds normal  No respiratory distress  She has no wheezes  She has no rales  Skin: Skin is warm and dry  She is not diaphoretic  Psychiatric: She has a normal mood and affect   Her behavior is normal  Judgment and thought content normal

## 2018-02-04 PROBLEM — Z88.9 MULTIPLE ALLERGIES: Status: ACTIVE | Noted: 2018-02-04

## 2018-02-04 PROBLEM — H60.501 ACUTE OTITIS EXTERNA OF RIGHT EAR: Status: ACTIVE | Noted: 2018-02-04

## 2018-02-04 PROBLEM — J40 TRACHEOBRONCHITIS: Status: RESOLVED | Noted: 2017-05-31 | Resolved: 2018-02-04

## 2018-02-04 NOTE — ASSESSMENT & PLAN NOTE
I will have her try oral prednisone  She has tolerated steroid injections  I instructed her to start with 1/2 tab of the 10mg and if she has no issues after a few hours, to take as prescribed  Appt with ENT arranged for her on Tuesday 2/6  Call with any issues

## 2018-02-12 DIAGNOSIS — I25.10 CORONARY ARTERY DISEASE INVOLVING NATIVE CORONARY ARTERY OF NATIVE HEART, ANGINA PRESENCE UNSPECIFIED: Primary | ICD-10-CM

## 2018-02-12 RX ORDER — NITROGLYCERIN 0.4 MG/1
TABLET SUBLINGUAL
Qty: 25 TABLET | Refills: 0 | Status: SHIPPED | OUTPATIENT
Start: 2018-02-12 | End: 2019-10-16 | Stop reason: SDUPTHER

## 2018-03-01 DIAGNOSIS — E78.5 HYPERLIPIDEMIA: ICD-10-CM

## 2018-03-15 ENCOUNTER — TRANSCRIBE ORDERS (OUTPATIENT)
Dept: LAB | Facility: CLINIC | Age: 83
End: 2018-03-15

## 2018-03-15 ENCOUNTER — APPOINTMENT (OUTPATIENT)
Dept: LAB | Facility: CLINIC | Age: 83
End: 2018-03-15
Payer: COMMERCIAL

## 2018-03-15 DIAGNOSIS — E78.5 HYPERLIPIDEMIA: ICD-10-CM

## 2018-03-15 LAB
ALBUMIN SERPL BCP-MCNC: 3.2 G/DL (ref 3.5–5)
ALP SERPL-CCNC: 100 U/L (ref 46–116)
ALT SERPL W P-5'-P-CCNC: 36 U/L (ref 12–78)
ANION GAP SERPL CALCULATED.3IONS-SCNC: 9 MMOL/L (ref 4–13)
AST SERPL W P-5'-P-CCNC: 33 U/L (ref 5–45)
BASOPHILS # BLD AUTO: 0.03 THOUSANDS/ΜL (ref 0–0.1)
BASOPHILS NFR BLD AUTO: 0 % (ref 0–1)
BILIRUB SERPL-MCNC: 0.9 MG/DL (ref 0.2–1)
BUN SERPL-MCNC: 15 MG/DL (ref 5–25)
CALCIUM SERPL-MCNC: 8.4 MG/DL (ref 8.3–10.1)
CHLORIDE SERPL-SCNC: 106 MMOL/L (ref 100–108)
CHOLEST SERPL-MCNC: 149 MG/DL (ref 50–200)
CO2 SERPL-SCNC: 29 MMOL/L (ref 21–32)
CREAT SERPL-MCNC: 0.88 MG/DL (ref 0.6–1.3)
EOSINOPHIL # BLD AUTO: 0.12 THOUSAND/ΜL (ref 0–0.61)
EOSINOPHIL NFR BLD AUTO: 2 % (ref 0–6)
ERYTHROCYTE [DISTWIDTH] IN BLOOD BY AUTOMATED COUNT: 13.6 % (ref 11.6–15.1)
GFR SERPL CREATININE-BSD FRML MDRD: 61 ML/MIN/1.73SQ M
GLUCOSE P FAST SERPL-MCNC: 96 MG/DL (ref 65–99)
HCT VFR BLD AUTO: 38.4 % (ref 34.8–46.1)
HDLC SERPL-MCNC: 64 MG/DL (ref 40–60)
HGB BLD-MCNC: 12.3 G/DL (ref 11.5–15.4)
LDLC SERPL CALC-MCNC: 63 MG/DL (ref 0–100)
LYMPHOCYTES # BLD AUTO: 2.77 THOUSANDS/ΜL (ref 0.6–4.47)
LYMPHOCYTES NFR BLD AUTO: 41 % (ref 14–44)
MCH RBC QN AUTO: 32.3 PG (ref 26.8–34.3)
MCHC RBC AUTO-ENTMCNC: 32 G/DL (ref 31.4–37.4)
MCV RBC AUTO: 101 FL (ref 82–98)
MONOCYTES # BLD AUTO: 0.64 THOUSAND/ΜL (ref 0.17–1.22)
MONOCYTES NFR BLD AUTO: 10 % (ref 4–12)
NEUTROPHILS # BLD AUTO: 3.18 THOUSANDS/ΜL (ref 1.85–7.62)
NEUTS SEG NFR BLD AUTO: 47 % (ref 43–75)
PLATELET # BLD AUTO: 144 THOUSANDS/UL (ref 149–390)
PMV BLD AUTO: 9.6 FL (ref 8.9–12.7)
POTASSIUM SERPL-SCNC: 3.9 MMOL/L (ref 3.5–5.3)
PROT SERPL-MCNC: 6.2 G/DL (ref 6.4–8.2)
RBC # BLD AUTO: 3.81 MILLION/UL (ref 3.81–5.12)
SODIUM SERPL-SCNC: 144 MMOL/L (ref 136–145)
TRIGL SERPL-MCNC: 112 MG/DL
WBC # BLD AUTO: 6.74 THOUSAND/UL (ref 4.31–10.16)

## 2018-03-15 PROCEDURE — 36415 COLL VENOUS BLD VENIPUNCTURE: CPT

## 2018-03-15 PROCEDURE — 80061 LIPID PANEL: CPT

## 2018-03-15 PROCEDURE — 85025 COMPLETE CBC W/AUTO DIFF WBC: CPT

## 2018-03-15 PROCEDURE — 80053 COMPREHEN METABOLIC PANEL: CPT

## 2018-04-16 ENCOUNTER — OFFICE VISIT (OUTPATIENT)
Dept: INTERNAL MEDICINE CLINIC | Facility: CLINIC | Age: 83
End: 2018-04-16
Payer: COMMERCIAL

## 2018-04-16 VITALS
HEART RATE: 76 BPM | DIASTOLIC BLOOD PRESSURE: 78 MMHG | OXYGEN SATURATION: 95 % | SYSTOLIC BLOOD PRESSURE: 146 MMHG | WEIGHT: 174 LBS | HEIGHT: 61 IN | BODY MASS INDEX: 32.85 KG/M2

## 2018-04-16 DIAGNOSIS — I25.10 CAD IN NATIVE ARTERY: ICD-10-CM

## 2018-04-16 DIAGNOSIS — M70.62 TROCHANTERIC BURSITIS OF LEFT HIP: ICD-10-CM

## 2018-04-16 DIAGNOSIS — E78.2 MIXED HYPERLIPIDEMIA: ICD-10-CM

## 2018-04-16 DIAGNOSIS — K21.9 GASTROESOPHAGEAL REFLUX DISEASE, ESOPHAGITIS PRESENCE NOT SPECIFIED: ICD-10-CM

## 2018-04-16 DIAGNOSIS — H66.001 ACUTE SUPPURATIVE OTITIS MEDIA OF RIGHT EAR WITHOUT SPONTANEOUS RUPTURE OF TYMPANIC MEMBRANE, RECURRENCE NOT SPECIFIED: Primary | ICD-10-CM

## 2018-04-16 DIAGNOSIS — H60.501 ACUTE OTITIS EXTERNA OF RIGHT EAR, UNSPECIFIED TYPE: ICD-10-CM

## 2018-04-16 PROBLEM — H66.90 ACUTE OTITIS MEDIA: Status: RESOLVED | Noted: 2018-02-02 | Resolved: 2018-04-16

## 2018-04-16 PROBLEM — M70.72 BURSITIS OF LEFT HIP: Status: ACTIVE | Noted: 2017-10-09

## 2018-04-16 PROCEDURE — 3725F SCREEN DEPRESSION PERFORMED: CPT | Performed by: INTERNAL MEDICINE

## 2018-04-16 PROCEDURE — 1101F PT FALLS ASSESS-DOCD LE1/YR: CPT | Performed by: INTERNAL MEDICINE

## 2018-04-16 PROCEDURE — 99214 OFFICE O/P EST MOD 30 MIN: CPT | Performed by: INTERNAL MEDICINE

## 2018-04-16 NOTE — PROGRESS NOTES
Assessment/Plan:  Cont current meds  Ulcer free diet  F/U with ortho re: bursitis-she has had an injection in the past which helped     Problem List Items Addressed This Visit     CAD in native artery    Hyperlipidemia    RESOLVED: Acute otitis media - Primary    RESOLVED: Acute otitis externa of right ear    Bursitis of left hip    Esophageal reflux            Subjective:      Patient ID: Mylene Thompson is a 80 y o  female  HPI  Pain on the  back of the head on the right side  Using heating pads  + right ear hearing loss  She was on doxycycline and a prednisone taper  a few months ago for OM  She has been seen by ENT  Chronic low back pain  Chronic left hip pain 5/10  She sees Dr Facundo Gonzalez who administers steroid injections  Motrin caused itching recently (she already has multiple drug allergies on record)  Recent labs reviewed CBC CMP lipid panel look good    The following portions of the patient's history were reviewed and updated as appropriate: allergies, current medications, past family history, past medical history, past social history, past surgical history and problem list     Review of Systems   Constitutional: Negative for fatigue, fever and unexpected weight change  HENT: Positive for hearing loss  Negative for ear pain, sinus pain, sinus pressure and sore throat  Respiratory: Negative for cough, shortness of breath and wheezing  Cardiovascular: Negative for chest pain, palpitations and leg swelling  Gastrointestinal: Negative for abdominal pain, constipation, diarrhea, nausea and vomiting         +reflux with certain food   Musculoskeletal: Positive for arthralgias  Negative for myalgias  Neurological: Negative for dizziness and headaches  Objective:      /78   Pulse 76   Ht 5' 1" (1 549 m)   Wt 78 9 kg (174 lb)   SpO2 95%   BMI 32 88 kg/m²          Physical Exam   Constitutional: She is oriented to person, place, and time  She appears well-developed and well-nourished  HENT:   Head: Normocephalic and atraumatic  Right Ear: External ear normal    Left Ear: External ear normal    Mouth/Throat: Oropharynx is clear and moist    Eyes: Conjunctivae are normal    Neck: Neck supple  Cardiovascular: Normal rate, regular rhythm and normal heart sounds  No murmur heard  Pulmonary/Chest: Effort normal and breath sounds normal  No respiratory distress  She has no wheezes  She has no rales  Abdominal: Soft  Bowel sounds are normal  She exhibits no distension and no mass  There is no tenderness  There is no rebound and no guarding  Musculoskeletal: Normal range of motion  Neurological: She is alert and oriented to person, place, and time  Skin: Skin is warm and dry  Psychiatric: She has a normal mood and affect   Her behavior is normal  Judgment and thought content normal

## 2018-05-09 DIAGNOSIS — E78.2 MIXED HYPERLIPIDEMIA: Primary | ICD-10-CM

## 2018-05-09 RX ORDER — NIACIN 1000 MG/1
TABLET, EXTENDED RELEASE ORAL
Qty: 30 TABLET | Refills: 5 | Status: SHIPPED | OUTPATIENT
Start: 2018-05-09 | End: 2018-11-20 | Stop reason: SDUPTHER

## 2018-05-15 ENCOUNTER — OFFICE VISIT (OUTPATIENT)
Dept: INTERNAL MEDICINE CLINIC | Facility: CLINIC | Age: 83
End: 2018-05-15
Payer: COMMERCIAL

## 2018-05-15 VITALS
SYSTOLIC BLOOD PRESSURE: 134 MMHG | DIASTOLIC BLOOD PRESSURE: 68 MMHG | HEIGHT: 61 IN | BODY MASS INDEX: 32.1 KG/M2 | WEIGHT: 170 LBS | HEART RATE: 83 BPM | OXYGEN SATURATION: 96 %

## 2018-05-15 DIAGNOSIS — J06.9 VIRAL UPPER RESPIRATORY ILLNESS: Primary | ICD-10-CM

## 2018-05-15 PROCEDURE — 99213 OFFICE O/P EST LOW 20 MIN: CPT | Performed by: NURSE PRACTITIONER

## 2018-05-15 PROCEDURE — 3008F BODY MASS INDEX DOCD: CPT | Performed by: NURSE PRACTITIONER

## 2018-05-15 RX ORDER — BENZONATATE 100 MG/1
100 CAPSULE ORAL 3 TIMES DAILY PRN
Qty: 30 CAPSULE | Refills: 0 | Status: SHIPPED | OUTPATIENT
Start: 2018-05-15 | End: 2018-12-19 | Stop reason: SDUPTHER

## 2018-05-15 NOTE — PROGRESS NOTES
Assessment/Plan:     Diagnoses and all orders for this visit:    Viral upper respiratory illness  Comments:  instructed to take mucinex  increase fluids, water and warm liquids     Orders:  -     benzonatate (TESSALON PERLES) 100 mg capsule; Take 1 capsule (100 mg total) by mouth 3 (three) times a day as needed for cough          Subjective:      Patient ID: Herberth Parmar is a 80 y o  female  Here for a cough  Started about 10 days ago  +dry cough, PND, losing voice  Denies any sore throat, congestion, headache, fever   Taking robitussin DM, flonase, cough drops which temporarily help         The following portions of the patient's history were reviewed and updated as appropriate: allergies, current medications, past family history, past medical history, past social history, past surgical history and problem list     Review of Systems   Constitutional: Positive for fatigue  Negative for chills and fever  HENT: Positive for postnasal drip and voice change  Negative for rhinorrhea, sinus pressure, sneezing and sore throat  Respiratory: Positive for cough  Negative for chest tightness, shortness of breath and wheezing  Cardiovascular: Negative  Neurological: Negative  Objective:      /68   Pulse 83   Ht 5' 1" (1 549 m)   Wt 77 1 kg (170 lb)   SpO2 96%   BMI 32 12 kg/m²          Physical Exam   Constitutional: She is oriented to person, place, and time  She appears well-developed and well-nourished  HENT:   Right Ear: External ear normal    Left Ear: External ear normal    Eyes: Pupils are equal, round, and reactive to light  Cardiovascular: Normal rate, regular rhythm and normal heart sounds  Pulmonary/Chest: Effort normal and breath sounds normal  No respiratory distress  Musculoskeletal: She exhibits no edema  Lymphadenopathy:     She has no cervical adenopathy  Neurological: She is alert and oriented to person, place, and time     Psychiatric: She has a normal mood and affect  Her behavior is normal    Vitals reviewed

## 2018-05-18 ENCOUNTER — TELEPHONE (OUTPATIENT)
Dept: INTERNAL MEDICINE CLINIC | Facility: CLINIC | Age: 83
End: 2018-05-18

## 2018-05-19 ENCOUNTER — APPOINTMENT (EMERGENCY)
Dept: CT IMAGING | Facility: HOSPITAL | Age: 83
End: 2018-05-19
Payer: COMMERCIAL

## 2018-05-19 ENCOUNTER — HOSPITAL ENCOUNTER (EMERGENCY)
Facility: HOSPITAL | Age: 83
Discharge: HOME | End: 2018-05-19
Attending: EMERGENCY MEDICINE | Admitting: EMERGENCY MEDICINE
Payer: COMMERCIAL

## 2018-05-19 ENCOUNTER — APPOINTMENT (EMERGENCY)
Dept: RADIOLOGY | Facility: HOSPITAL | Age: 83
End: 2018-05-19
Payer: COMMERCIAL

## 2018-05-19 VITALS
DIASTOLIC BLOOD PRESSURE: 65 MMHG | OXYGEN SATURATION: 96 % | HEART RATE: 72 BPM | SYSTOLIC BLOOD PRESSURE: 131 MMHG | RESPIRATION RATE: 18 BRPM | TEMPERATURE: 98.3 F

## 2018-05-19 DIAGNOSIS — J06.9 UPPER RESPIRATORY INFECTION: Primary | ICD-10-CM

## 2018-05-19 LAB
ALBUMIN SERPL BCP-MCNC: 3.2 G/DL (ref 3.5–5)
ALP SERPL-CCNC: 96 U/L (ref 46–116)
ALT SERPL W P-5'-P-CCNC: 17 U/L (ref 12–78)
ANION GAP SERPL CALCULATED.3IONS-SCNC: 7 MMOL/L (ref 4–13)
AST SERPL W P-5'-P-CCNC: 21 U/L (ref 5–45)
BASOPHILS # BLD AUTO: 0.04 THOUSANDS/ΜL (ref 0–0.1)
BASOPHILS NFR BLD AUTO: 1 % (ref 0–1)
BILIRUB SERPL-MCNC: 0.4 MG/DL (ref 0.2–1)
BUN SERPL-MCNC: 8 MG/DL (ref 5–25)
CALCIUM SERPL-MCNC: 9.3 MG/DL (ref 8.3–10.1)
CHLORIDE SERPL-SCNC: 106 MMOL/L (ref 100–108)
CO2 SERPL-SCNC: 29 MMOL/L (ref 21–32)
CREAT SERPL-MCNC: 0.8 MG/DL (ref 0.6–1.3)
EOSINOPHIL # BLD AUTO: 0.14 THOUSAND/ΜL (ref 0–0.61)
EOSINOPHIL NFR BLD AUTO: 2 % (ref 0–6)
ERYTHROCYTE [DISTWIDTH] IN BLOOD BY AUTOMATED COUNT: 12.8 % (ref 11.6–15.1)
GFR SERPL CREATININE-BSD FRML MDRD: 69 ML/MIN/1.73SQ M
GLUCOSE SERPL-MCNC: 96 MG/DL (ref 65–140)
HCT VFR BLD AUTO: 34.3 % (ref 34.8–46.1)
HGB BLD-MCNC: 11.2 G/DL (ref 11.5–15.4)
LYMPHOCYTES # BLD AUTO: 2.48 THOUSANDS/ΜL (ref 0.6–4.47)
LYMPHOCYTES NFR BLD AUTO: 32 % (ref 14–44)
MCH RBC QN AUTO: 32.4 PG (ref 26.8–34.3)
MCHC RBC AUTO-ENTMCNC: 32.7 G/DL (ref 31.4–37.4)
MCV RBC AUTO: 99 FL (ref 82–98)
MONOCYTES # BLD AUTO: 0.74 THOUSAND/ΜL (ref 0.17–1.22)
MONOCYTES NFR BLD AUTO: 10 % (ref 4–12)
NEUTROPHILS # BLD AUTO: 4.42 THOUSANDS/ΜL (ref 1.85–7.62)
NEUTS SEG NFR BLD AUTO: 57 % (ref 43–75)
PLATELET # BLD AUTO: 189 THOUSANDS/UL (ref 149–390)
PMV BLD AUTO: 9.3 FL (ref 8.9–12.7)
POTASSIUM SERPL-SCNC: 3.6 MMOL/L (ref 3.5–5.3)
PROT SERPL-MCNC: 6.2 G/DL (ref 6.4–8.2)
RBC # BLD AUTO: 3.46 MILLION/UL (ref 3.81–5.12)
SODIUM SERPL-SCNC: 142 MMOL/L (ref 136–145)
WBC # BLD AUTO: 7.82 THOUSAND/UL (ref 4.31–10.16)

## 2018-05-19 PROCEDURE — 80053 COMPREHEN METABOLIC PANEL: CPT | Performed by: PHYSICIAN ASSISTANT

## 2018-05-19 PROCEDURE — 96360 HYDRATION IV INFUSION INIT: CPT

## 2018-05-19 PROCEDURE — 36415 COLL VENOUS BLD VENIPUNCTURE: CPT | Performed by: PHYSICIAN ASSISTANT

## 2018-05-19 PROCEDURE — 71046 X-RAY EXAM CHEST 2 VIEWS: CPT

## 2018-05-19 PROCEDURE — 85025 COMPLETE CBC W/AUTO DIFF WBC: CPT | Performed by: PHYSICIAN ASSISTANT

## 2018-05-19 PROCEDURE — 70490 CT SOFT TISSUE NECK W/O DYE: CPT

## 2018-05-19 PROCEDURE — 99284 EMERGENCY DEPT VISIT MOD MDM: CPT

## 2018-05-19 RX ADMIN — SODIUM CHLORIDE 500 ML: 0.9 INJECTION, SOLUTION INTRAVENOUS at 14:24

## 2018-05-19 NOTE — DISCHARGE INSTRUCTIONS

## 2018-05-19 NOTE — ED PROVIDER NOTES
History  Chief Complaint   Patient presents with    Cough     pt reports ongoing cough for a few week pt saw PCP tuesday, given tesslon pearls and musinex with no improvement  Pt comes in today for ongoing cough and hoarse voice  51-year-old female presents to the emergency department with complaints of upper respiratory symptoms  States that she has had ongoing cough over the past week and has now had intermittent loss of her voice  Seen by her family doctor 4 days ago and was started on Mucinex as well as Tessalon Perles  States that today she woke up with some right-sided neck pain as well which seems to be worse when she swallows when turns her head in certain directions  No fevers  Denies chest pain or difficulty breathing  History provided by:  Patient   used: No    Cough   Cough characteristics:  Dry and hacking  Severity:  Mild  Onset quality:  Gradual  Duration:  1 week  Timing:  Intermittent  Progression:  Waxing and waning  Chronicity:  New  Context: upper respiratory infection    Context: not animal exposure, not exposure to allergens, not fumes, not occupational exposure, not sick contacts, not smoke exposure, not weather changes and not with activity    Relieved by:  Nothing  Ineffective treatments: Mucinex, Tessalon Perles  Associated symptoms: sore throat    Associated symptoms: no chest pain, no chills, no diaphoresis, no ear fullness, no ear pain, no eye discharge, no fever, no headaches, no myalgias, no rash, no rhinorrhea, no shortness of breath, no sinus congestion, no weight loss and no wheezing        Prior to Admission Medications   Prescriptions Last Dose Informant Patient Reported? Taking?    Cholecalciferol (VITAMIN D3) 1000 units CAPS   Yes Yes   Sig: Take 1 tablet by mouth daily   Cholecalciferol 21736 UNITS CAPS   Yes Yes   Sig: Take 1 tablet by mouth daily   Multiple Vitamins-Minerals (CENTRUM ADULTS PO)   Yes Yes   Sig: Take 1 tablet by mouth daily   acetaminophen (TYLENOL ARTHRITIS PAIN) 650 mg CR tablet   Yes Yes   Sig: Take by mouth   acetaminophen (TYLENOL) 650 mg CR tablet   Yes No   Sig: Take 650 mg by mouth 2 (two) times a day   aspirin 81 MG tablet   Yes Yes   Sig: Take 81 mg by mouth daily   benzonatate (TESSALON PERLES) 100 mg capsule   No Yes   Sig: Take 1 capsule (100 mg total) by mouth 3 (three) times a day as needed for cough   calcium citrate-vitamin D (CITRACAL+D) 315-200 MG-UNIT per tablet   Yes Yes   Sig: Take 1 tablet by mouth 2 (two) times a day   famotidine (PEPCID) 20 mg tablet   Yes Yes   Sig: Take 20 mg by mouth 2 (two) times a day   niacin (NIASPAN) 1000 MG CR tablet   No Yes   Sig: TAKE 1 TABLET BY MOUTH EVERY NIGHT AT BEDTIME   nitroglycerin (NITROSTAT) 0 4 mg SL tablet   No Yes   Sig: PLACE 1 TABLET UNDER TONGUE EVERY 5 MINUTES FOR UP TO 3 DOSES AS NEEDED FOR CHEST PAIN- CALL 911 IF PAIN PERSISTS   pantoprazole (PROTONIX) 40 mg tablet   Yes Yes   Sig: Take 40 mg by mouth daily   prasugrel (EFFIENT) tablet   Yes Yes   Sig: Take 10 mg by mouth   predniSONE 10 mg tablet   No Yes   Si tabs daily for 3 days then 3 for 2 days then 2 for 1 day then 1 for 1 day   simvastatin (ZOCOR) 40 mg tablet   Yes Yes   Sig: Take 40 mg by mouth daily at bedtime      Facility-Administered Medications: None       Past Medical History:   Diagnosis Date    Cardiac disease     Hyperlipidemia     UTI (urinary tract infection) 2017       Past Surgical History:   Procedure Laterality Date    APPENDECTOMY      HERNIA REPAIR      JOINT REPLACEMENT      bilat knee, bilat hip, L shoulder    KNEE SURGERY Bilateral        History reviewed  No pertinent family history  I have reviewed and agree with the history as documented      Social History   Substance Use Topics    Smoking status: Never Smoker    Smokeless tobacco: Never Used    Alcohol use No        Review of Systems   Constitutional: Negative for activity change, chills, diaphoresis, fever and weight loss  HENT: Positive for sore throat  Negative for dental problem, drooling, ear pain, mouth sores, rhinorrhea, sinus pressure, sneezing, tinnitus and trouble swallowing  Eyes: Negative for pain, discharge and itching  Respiratory: Positive for cough  Negative for chest tightness, shortness of breath and wheezing  Cardiovascular: Negative for chest pain  Musculoskeletal: Negative for myalgias  Skin: Negative for rash  Neurological: Negative for dizziness, light-headedness and headaches  All other systems reviewed and are negative  Physical Exam  Physical Exam   Constitutional: She is oriented to person, place, and time  Vital signs are normal  She appears well-developed and well-nourished  No distress  HENT:   Head: Normocephalic and atraumatic  Right Ear: Hearing, tympanic membrane, external ear and ear canal normal    Left Ear: Hearing, tympanic membrane, external ear and ear canal normal    Nose: Nose normal    Mouth/Throat: Uvula is midline  Posterior oropharyngeal erythema present  No oropharyngeal exudate  Eyes: Conjunctivae, EOM and lids are normal    Neck: Normal range of motion and full passive range of motion without pain  Cardiovascular: Normal rate and regular rhythm  Exam reveals no gallop and no friction rub  No murmur heard  Pulmonary/Chest: Effort normal and breath sounds normal  No respiratory distress  She has no wheezes  She has no rhonchi  She has no rales  She exhibits no tenderness  Musculoskeletal: Normal range of motion  Neurological: She is alert and oriented to person, place, and time  Skin: Skin is warm and dry  She is not diaphoretic  Psychiatric: She has a normal mood and affect  Her behavior is normal    Vitals reviewed        Vital Signs  ED Triage Vitals   Temperature Pulse Respirations Blood Pressure SpO2   05/19/18 1243 05/19/18 1239 05/19/18 1239 05/19/18 1239 05/19/18 1239   98 3 °F (36 8 °C) 80 20 148/71 99 % Temp Source Heart Rate Source Patient Position - Orthostatic VS BP Location FiO2 (%)   05/19/18 1243 05/19/18 1239 05/19/18 1239 05/19/18 1239 --   Oral Monitor Lying Right arm       Pain Score       05/19/18 1239       2           Vitals:    05/19/18 1239 05/19/18 1430   BP: 148/71 131/65   Pulse: 80 72   Patient Position - Orthostatic VS: Lying        Visual Acuity      ED Medications  Medications   sodium chloride 0 9 % bolus 500 mL (0 mL Intravenous Stopped 5/19/18 1524)       Diagnostic Studies  Results Reviewed     Procedure Component Value Units Date/Time    Comprehensive metabolic panel [[id number]]  (Abnormal) Collected:  05/19/18 1424    Lab Status:  Final result Specimen:  Blood from Arm, Left Updated:  05/19/18 1456     Sodium 142 mmol/L      Potassium 3 6 mmol/L      Chloride 106 mmol/L      CO2 29 mmol/L      Anion Gap 7 mmol/L      BUN 8 mg/dL      Creatinine 0 80 mg/dL      Glucose 96 mg/dL      Calcium 9 3 mg/dL      AST 21 U/L      ALT 17 U/L      Alkaline Phosphatase 96 U/L      Total Protein 6 2 (L) g/dL      Albumin 3 2 (L) g/dL      Total Bilirubin 0 40 mg/dL      eGFR 69 ml/min/1 73sq m     Narrative:         National Kidney Disease Education Program recommendations are as follows:  GFR calculation is accurate only with a steady state creatinine  Chronic Kidney disease less than 60 ml/min/1 73 sq  meters  Kidney failure less than 15 ml/min/1 73 sq  meters      CBC and differential [[id number]]  (Abnormal) Collected:  05/19/18 1424    Lab Status:  Final result Specimen:  Blood from Arm, Left Updated:  05/19/18 1431     WBC 7 82 Thousand/uL      RBC 3 46 (L) Million/uL      Hemoglobin 11 2 (L) g/dL      Hematocrit 34 3 (L) %      MCV 99 (H) fL      MCH 32 4 pg      MCHC 32 7 g/dL      RDW 12 8 %      MPV 9 3 fL      Platelets 751 Thousands/uL      Neutrophils Relative 57 %      Lymphocytes Relative 32 %      Monocytes Relative 10 %      Eosinophils Relative 2 %      Basophils Relative 1 % Neutrophils Absolute 4 42 Thousands/µL      Lymphocytes Absolute 2 48 Thousands/µL      Monocytes Absolute 0 74 Thousand/µL      Eosinophils Absolute 0 14 Thousand/µL      Basophils Absolute 0 04 Thousands/µL                  CT soft tissue neck wo contrast   Final Result by Carlos Toro MD (05/19 8146)      No acute CT findings although limited by noncontrast evaluation  Workstation performed: [id number]         XR chest 2 views   ED Interpretation by Citlaly Brown PA-C (05/19 8689)   Clear lungs      Final Result by Yessy Cervantes DO (05/19 7162)      No acute cardiopulmonary disease  Workstation performed: [id number]                    Procedures  Procedures       Phone Contacts  ED Phone Contact    ED Course                               MDM  Number of Diagnoses or Management Options  Upper respiratory infection:   Diagnosis management comments: Differential diagnosis includes but not limited to:  Upper respiratory infection, viral pharyngitis, fluid collection, bronchitis, pneumonia       Amount and/or Complexity of Data Reviewed  Clinical lab tests: ordered and reviewed  Tests in the radiology section of CPT®: ordered and reviewed  Independent visualization of images, tracings, or specimens: yes      CritCare Time    Disposition  Final diagnoses:   Upper respiratory infection     Time reflects when diagnosis was documented in both MDM as applicable and the Disposition within this note     Time User Action Codes Description Comment    5/19/2018  4:01 PM Benedict Johns Add [J06 9] Upper respiratory infection       ED Disposition     ED Disposition Condition Comment    Discharge  [address] discharge to home/self care      Condition at discharge: Stable        Follow-up Information     Follow up With Specialties Details Why Jayshree Arias MD Internal Medicine   [id number]  [phone]            Discharge Medication List as of 5/19/2018  4:02 PM      CONTINUE these medications which have NOT CHANGED    Details   !! acetaminophen (TYLENOL ARTHRITIS PAIN) 650 mg CR tablet Take by mouth, Historical Med      aspirin 81 MG tablet Take 81 mg by mouth daily, Until Discontinued, Historical Med      benzonatate (TESSALON PERLES) 100 mg capsule Take 1 capsule (100 mg total) by mouth 3 (three) times a day as needed for cough, Starting Tue 5/15/2018, Normal      calcium citrate-vitamin D (CITRACAL+D) 315-200 MG-UNIT per tablet Take 1 tablet by mouth 2 (two) times a day, Until Discontinued, Historical Med      !! Cholecalciferol (VITAMIN D3) 1000 units CAPS Take 1 tablet by mouth daily, Historical Med      !! Cholecalciferol 57046 UNITS CAPS Take 1 tablet by mouth daily, Until Discontinued, Historical Med      famotidine (PEPCID) 20 mg tablet Take 20 mg by mouth 2 (two) times a day, Until Discontinued, Historical Med      Multiple Vitamins-Minerals (CENTRUM ADULTS PO) Take 1 tablet by mouth daily, Historical Med      niacin (NIASPAN) 1000 MG CR tablet TAKE 1 TABLET BY MOUTH EVERY NIGHT AT BEDTIME, Normal      nitroglycerin (NITROSTAT) 0 4 mg SL tablet PLACE 1 TABLET UNDER TONGUE EVERY 5 MINUTES FOR UP TO 3 DOSES AS NEEDED FOR CHEST PAIN- CALL 911 IF PAIN PERSISTS, Normal      pantoprazole (PROTONIX) 40 mg tablet Take 40 mg by mouth daily, Until Discontinued, Historical Med      prasugrel (EFFIENT) tablet Take 10 mg by mouth, Until Discontinued, Historical Med      predniSONE 10 mg tablet 4 tabs daily for 3 days then 3 for 2 days then 2 for 1 day then 1 for 1 day, Normal      simvastatin (ZOCOR) 40 mg tablet Take 40 mg by mouth daily at bedtime, Until Discontinued, Historical Med      !! acetaminophen (TYLENOL) 650 mg CR tablet Take 650 mg by mouth 2 (two) times a day, Until Discontinued, Historical Med       !! - Potential duplicate medications found  Please discuss with provider  No discharge procedures on file      ED Provider  Electronically Signed by           Emma Liu PA-C  05/19/18 3471

## 2018-05-20 DIAGNOSIS — K21.9 GASTROESOPHAGEAL REFLUX DISEASE, ESOPHAGITIS PRESENCE NOT SPECIFIED: Primary | ICD-10-CM

## 2018-05-21 RX ORDER — PANTOPRAZOLE SODIUM 40 MG/1
TABLET, DELAYED RELEASE ORAL
Qty: 30 TABLET | Refills: 5 | Status: SHIPPED | OUTPATIENT
Start: 2018-05-21 | End: 2018-11-28 | Stop reason: SDUPTHER

## 2018-06-26 DIAGNOSIS — E78.2 MIXED HYPERLIPIDEMIA: Primary | ICD-10-CM

## 2018-06-26 RX ORDER — SIMVASTATIN 40 MG
TABLET ORAL
Qty: 30 TABLET | Refills: 5 | Status: SHIPPED | OUTPATIENT
Start: 2018-06-26 | End: 2018-12-24 | Stop reason: SDUPTHER

## 2018-07-25 DIAGNOSIS — K21.9 GASTROESOPHAGEAL REFLUX DISEASE, ESOPHAGITIS PRESENCE NOT SPECIFIED: Primary | ICD-10-CM

## 2018-07-26 RX ORDER — FAMOTIDINE 20 MG/1
TABLET, FILM COATED ORAL
Qty: 30 TABLET | Refills: 5 | Status: SHIPPED | OUTPATIENT
Start: 2018-07-26 | End: 2019-01-27 | Stop reason: SDUPTHER

## 2018-07-30 ENCOUNTER — OFFICE VISIT (OUTPATIENT)
Dept: INTERNAL MEDICINE CLINIC | Facility: CLINIC | Age: 83
End: 2018-07-30
Payer: COMMERCIAL

## 2018-07-30 VITALS
DIASTOLIC BLOOD PRESSURE: 70 MMHG | OXYGEN SATURATION: 96 % | SYSTOLIC BLOOD PRESSURE: 132 MMHG | HEIGHT: 61 IN | HEART RATE: 72 BPM | WEIGHT: 170 LBS | BODY MASS INDEX: 32.1 KG/M2

## 2018-07-30 DIAGNOSIS — K21.9 GASTROESOPHAGEAL REFLUX DISEASE, ESOPHAGITIS PRESENCE NOT SPECIFIED: ICD-10-CM

## 2018-07-30 DIAGNOSIS — I25.10 CAD IN NATIVE ARTERY: ICD-10-CM

## 2018-07-30 DIAGNOSIS — E78.2 MIXED HYPERLIPIDEMIA: Primary | ICD-10-CM

## 2018-07-30 PROCEDURE — 99214 OFFICE O/P EST MOD 30 MIN: CPT | Performed by: INTERNAL MEDICINE

## 2018-07-30 NOTE — PROGRESS NOTES
Assessment/Plan:  Cont current meds     Problem List Items Addressed This Visit     CAD in native artery    Relevant Orders    CBC and differential    Comprehensive metabolic panel    Lipid panel    Hyperlipidemia - Primary    Relevant Orders    CBC and differential    Comprehensive metabolic panel    Lipid panel    Esophageal reflux            Subjective:      Patient ID: Anuj Fernandes is a 80 y o  female  HPI  Here for follow up   No new issues  No complaints  Chronic LE edema, improving  Admits to eating a lot of tomatoes, butter  Lipids controlled  She is on famotidine and pantoprazole     The following portions of the patient's history were reviewed and updated as appropriate: allergies, current medications, past family history, past medical history, past social history, past surgical history and problem list     Review of Systems   Constitutional: Negative for fatigue, fever and unexpected weight change  HENT: Negative for ear pain, hearing loss, sinus pain, sinus pressure and sore throat  Respiratory: Negative for cough, shortness of breath and wheezing  Cardiovascular: Negative for chest pain, palpitations and leg swelling  Gastrointestinal: Negative for abdominal pain, constipation, diarrhea, nausea and vomiting  Musculoskeletal: Positive for arthralgias  Negative for myalgias  Neurological: Negative for dizziness and headaches  Objective:      /70 (BP Location: Right arm, Patient Position: Sitting, Cuff Size: Adult)   Pulse 72   Ht 5' 1" (1 549 m)   Wt 77 1 kg (170 lb)   SpO2 96%   BMI 32 12 kg/m²          Physical Exam   Constitutional: She is oriented to person, place, and time  She appears well-developed and well-nourished  HENT:   Head: Normocephalic and atraumatic  Right Ear: External ear normal    Left Ear: External ear normal    Mouth/Throat: Oropharynx is clear and moist    Eyes: Conjunctivae are normal    Neck: Neck supple     Cardiovascular: Normal rate, regular rhythm and normal heart sounds  No murmur heard  Pulmonary/Chest: Effort normal and breath sounds normal  No respiratory distress  She has no wheezes  She has no rales  Abdominal: Soft  Bowel sounds are normal  She exhibits no distension and no mass  There is no tenderness  There is no rebound and no guarding  Musculoskeletal: Normal range of motion  She exhibits edema (1+ pitting pretibial edema)  Neurological: She is alert and oriented to person, place, and time  Skin: Skin is warm and dry  Psychiatric: She has a normal mood and affect   Her behavior is normal  Judgment and thought content normal

## 2018-11-06 ENCOUNTER — OFFICE VISIT (OUTPATIENT)
Dept: CARDIOLOGY CLINIC | Facility: CLINIC | Age: 83
End: 2018-11-06
Payer: COMMERCIAL

## 2018-11-06 VITALS
WEIGHT: 167.1 LBS | BODY MASS INDEX: 31.55 KG/M2 | SYSTOLIC BLOOD PRESSURE: 130 MMHG | HEART RATE: 78 BPM | DIASTOLIC BLOOD PRESSURE: 74 MMHG | HEIGHT: 61 IN

## 2018-11-06 DIAGNOSIS — E78.2 MIXED HYPERLIPIDEMIA: ICD-10-CM

## 2018-11-06 DIAGNOSIS — I10 HYPERTENSION, ESSENTIAL, BENIGN: ICD-10-CM

## 2018-11-06 DIAGNOSIS — I25.10 CAD IN NATIVE ARTERY: Primary | ICD-10-CM

## 2018-11-06 PROCEDURE — 99214 OFFICE O/P EST MOD 30 MIN: CPT | Performed by: INTERNAL MEDICINE

## 2018-11-06 PROCEDURE — 93000 ELECTROCARDIOGRAM COMPLETE: CPT | Performed by: INTERNAL MEDICINE

## 2018-11-06 NOTE — PROGRESS NOTES
Cardiology Follow Up    Gilbert San  1935  [id number]  [address]  [address] [place] [address]  [phone]    1  CAD in native artery  POCT ECG   2  Mixed hyperlipidemia     3  Hypertension, essential, benign         Interval History:   Cardiology follow-up  Patient continues to do well, she is very active but no red exercising regularly  States been compliant with low-cholesterol diet  Compliant low-sodium diet, her blood pressures been well control  Recent LDL of 63 on statin therapy high-intensity      Patient Active Problem List   Diagnosis    Ambulatory dysfunction    H/O right knee surgery    CAD in native artery    History of cardiac catheterization    Hyperlipidemia    Multiple allergies    Bursitis of left hip    Osteoporosis    Esophageal reflux    Hypertension, essential, benign     Past Medical History:   Diagnosis Date    Arthritis     Cardiac disease     Hyperlipidemia     Hypertension     UTI (urinary tract infection) 5/31/2017     Social History     Social History    Marital status:      Spouse name: N/A    Number of children: 4    Years of education: N/A     Occupational History    Retired      Social History Main Topics    Smoking status: Never Smoker    Smokeless tobacco: Never Used    Alcohol use No      Comment: social alcohol use, per [employer]    Drug use: No    Sexual activity: Not on file     Other Topics Concern    Not on file     Social History Narrative    Caffeine use, active    Completed 8th grade    No caffeine use, active, per [employer]      Family History   Problem Relation Age of Onset    Breast cancer Mother     Leukemia Mother     Gout Mother     Hypertension Mother     Bone cancer Father     Cervical cancer Sister     Aneurysm Family     Heart attack Family         myocardial infarction    Rheum arthritis Family Past Surgical History:   Procedure Laterality Date    APPENDECTOMY      CORONARY ANGIOPLASTY WITH STENT PLACEMENT      CYSTOSCOPY  12/29/2014    Diagnostic    HERNIA REPAIR      JOINT REPLACEMENT      bilat knee, bilat hip, L shoulder    KNEE SURGERY Bilateral     SHOULDER SURGERY  2012    TOTAL HIP ARTHROPLASTY Bilateral        Current Outpatient Prescriptions:     acetaminophen (TYLENOL ARTHRITIS PAIN) 650 mg CR tablet, Take by mouth, Disp: , Rfl:     aspirin 81 MG tablet, Take 81 mg by mouth daily, Disp: , Rfl:     benzonatate (TESSALON PERLES) 100 mg capsule, Take 1 capsule (100 mg total) by mouth 3 (three) times a day as needed for cough, Disp: 30 capsule, Rfl: 0    calcium citrate-vitamin D (CITRACAL+D) 315-200 MG-UNIT per tablet, Take 1 tablet by mouth 2 (two) times a day, Disp: , Rfl:     Cholecalciferol (VITAMIN D3) 1000 units CAPS, Take 1 tablet by mouth daily, Disp: , Rfl:     famotidine (PEPCID) 20 mg tablet, TAKE 1 TABLET BY MOUTH EVERY DAY, Disp: 30 tablet, Rfl: 5    Multiple Vitamins-Minerals (CENTRUM ADULTS PO), Take 1 tablet by mouth daily, Disp: , Rfl:     niacin (NIASPAN) 1000 MG CR tablet, TAKE 1 TABLET BY MOUTH EVERY NIGHT AT BEDTIME, Disp: 30 tablet, Rfl: 5    nitroglycerin (NITROSTAT) 0 4 mg SL tablet, PLACE 1 TABLET UNDER TONGUE EVERY 5 MINUTES FOR UP TO 3 DOSES AS NEEDED FOR CHEST PAIN- CALL 911 IF PAIN PERSISTS, Disp: 25 tablet, Rfl: 0    pantoprazole (PROTONIX) 40 mg tablet, TAKE 1 TABLET BY MOUTH EVERY DAY, Disp: 30 tablet, Rfl: 5    simvastatin (ZOCOR) 40 mg tablet, TAKE 1 TABLET BY MOUTH DAILY, Disp: 30 tablet, Rfl: 5  Allergies   Allergen Reactions    Cephalexin Hives    Ciprofloxacin Hives    Cyclophosphamide Hives    Dexamethasone Hives    Erythromycin Hives    Flu Virus Vaccine Hives    Iodinated Casein     Loratadine     Macrobid  [Nitrofurantoin Monohyd Macro] Hives    Motrin [Ibuprofen] Itching    Mucinex Chest Congestion Child [Guaifenesin] Itching    Nitrofurantoin     Omeprazole     Oxycodone-Acetaminophen Other (See Comments)    Penicillins     Percocet  [Oxycodone-Acetaminophen]     Pneumococcal Vac Polyvalent Hives    Povidone Iodine        Labs:  Admission on 05/19/2018, Discharged on 05/19/2018   Component Date Value    WBC 05/19/2018 7 82     RBC 05/19/2018 3 46*    Hemoglobin 05/19/2018 11 2*    Hematocrit 05/19/2018 34 3*    MCV 05/19/2018 99*    MCH 05/19/2018 32 4     MCHC 05/19/2018 32 7     RDW 05/19/2018 12 8     MPV 05/19/2018 9 3     Platelets 07/48/1082 189     Neutrophils Relative 05/19/2018 57     Lymphocytes Relative 05/19/2018 32     Monocytes Relative 05/19/2018 10     Eosinophils Relative 05/19/2018 2     Basophils Relative 05/19/2018 1     Neutrophils Absolute 05/19/2018 4 42     Lymphocytes Absolute 05/19/2018 2 48     Monocytes Absolute 05/19/2018 0 74     Eosinophils Absolute 05/19/2018 0 14     Basophils Absolute 05/19/2018 0 04     Sodium 05/19/2018 142     Potassium 05/19/2018 3 6     Chloride 05/19/2018 106     CO2 05/19/2018 29     ANION GAP 05/19/2018 7     BUN 05/19/2018 8     Creatinine 05/19/2018 0 80     Glucose 05/19/2018 96     Calcium 05/19/2018 9 3     AST 05/19/2018 21     ALT 05/19/2018 17     Alkaline Phosphatase 05/19/2018 96     Total Protein 05/19/2018 6 2*    Albumin 05/19/2018 3 2*    Total Bilirubin 05/19/2018 0 40     eGFR 05/19/2018 69      Imaging: No results found  Review of Systems:  Review of Systems   Constitutional: Negative for activity change and fatigue  HENT: Negative for nosebleeds  Eyes: Negative for visual disturbance  Respiratory: Negative for apnea, shortness of breath, wheezing and stridor  Cardiovascular: Negative for chest pain, palpitations and leg swelling  Gastrointestinal: Negative for abdominal pain and blood in stool  Endocrine: Negative for cold intolerance     Genitourinary: Negative for difficulty urinating and hematuria  Musculoskeletal: Positive for arthralgias  Negative for myalgias  Skin: Negative for pallor and rash  Allergic/Immunologic: Negative for immunocompromised state  Neurological: Negative for dizziness, syncope and weakness  Hematological: Does not bruise/bleed easily  Psychiatric/Behavioral: Negative for sleep disturbance  Physical Exam:  Physical Exam   Constitutional: She is oriented to person, place, and time  She appears well-developed  No distress  Eyes: No scleral icterus  Neck: No JVD present  Cardiovascular: Normal rate, regular rhythm, normal heart sounds and intact distal pulses  Exam reveals no gallop and no friction rub  No murmur heard  Pulmonary/Chest: Effort normal and breath sounds normal  No respiratory distress  She has no wheezes  She has no rales  She exhibits no tenderness  Neurological: She is alert and oriented to person, place, and time  Skin: Skin is warm and dry  She is not diaphoretic  Psychiatric: She has a normal mood and affect  Discussion/Summary:  Coronary disease, PTCA/stent of the large diagonal, dyspnea was her angina equivalent, mild-to-moderate disease in the RCA and ramus intermedius manage medically in 2015  Stress test 2016 revealed no ischemia  Left systolic function is normal with stage I diastolic function and mild mitral insufficiency overall doing well  Continue current medical regimen

## 2018-11-08 ENCOUNTER — APPOINTMENT (OUTPATIENT)
Dept: LAB | Facility: CLINIC | Age: 83
End: 2018-11-08
Payer: COMMERCIAL

## 2018-11-08 DIAGNOSIS — I25.10 CAD IN NATIVE ARTERY: ICD-10-CM

## 2018-11-08 DIAGNOSIS — E78.2 MIXED HYPERLIPIDEMIA: ICD-10-CM

## 2018-11-08 LAB
ALBUMIN SERPL BCP-MCNC: 3.3 G/DL (ref 3.5–5)
ALP SERPL-CCNC: 133 U/L (ref 46–116)
ALT SERPL W P-5'-P-CCNC: 28 U/L (ref 12–78)
ANION GAP SERPL CALCULATED.3IONS-SCNC: 9 MMOL/L (ref 4–13)
AST SERPL W P-5'-P-CCNC: 31 U/L (ref 5–45)
BASOPHILS # BLD AUTO: 0.05 THOUSANDS/ΜL (ref 0–0.1)
BASOPHILS NFR BLD AUTO: 1 % (ref 0–1)
BILIRUB SERPL-MCNC: 0.6 MG/DL (ref 0.2–1)
BUN SERPL-MCNC: 14 MG/DL (ref 5–25)
CALCIUM SERPL-MCNC: 9.2 MG/DL (ref 8.3–10.1)
CHLORIDE SERPL-SCNC: 107 MMOL/L (ref 100–108)
CHOLEST SERPL-MCNC: 147 MG/DL (ref 50–200)
CO2 SERPL-SCNC: 28 MMOL/L (ref 21–32)
CREAT SERPL-MCNC: 0.8 MG/DL (ref 0.6–1.3)
EOSINOPHIL # BLD AUTO: 0.16 THOUSAND/ΜL (ref 0–0.61)
EOSINOPHIL NFR BLD AUTO: 3 % (ref 0–6)
ERYTHROCYTE [DISTWIDTH] IN BLOOD BY AUTOMATED COUNT: 13.5 % (ref 11.6–15.1)
GFR SERPL CREATININE-BSD FRML MDRD: 68 ML/MIN/1.73SQ M
GLUCOSE P FAST SERPL-MCNC: 101 MG/DL (ref 65–99)
HCT VFR BLD AUTO: 36.1 % (ref 34.8–46.1)
HDLC SERPL-MCNC: 61 MG/DL (ref 40–60)
HGB BLD-MCNC: 11.5 G/DL (ref 11.5–15.4)
IMM GRANULOCYTES # BLD AUTO: 0.01 THOUSAND/UL (ref 0–0.2)
IMM GRANULOCYTES NFR BLD AUTO: 0 % (ref 0–2)
LDLC SERPL CALC-MCNC: 71 MG/DL (ref 0–100)
LYMPHOCYTES # BLD AUTO: 2.51 THOUSANDS/ΜL (ref 0.6–4.47)
LYMPHOCYTES NFR BLD AUTO: 41 % (ref 14–44)
MCH RBC QN AUTO: 32.3 PG (ref 26.8–34.3)
MCHC RBC AUTO-ENTMCNC: 31.9 G/DL (ref 31.4–37.4)
MCV RBC AUTO: 101 FL (ref 82–98)
MONOCYTES # BLD AUTO: 0.58 THOUSAND/ΜL (ref 0.17–1.22)
MONOCYTES NFR BLD AUTO: 9 % (ref 4–12)
NEUTROPHILS # BLD AUTO: 2.87 THOUSANDS/ΜL (ref 1.85–7.62)
NEUTS SEG NFR BLD AUTO: 46 % (ref 43–75)
NONHDLC SERPL-MCNC: 86 MG/DL
NRBC BLD AUTO-RTO: 0 /100 WBCS
PLATELET # BLD AUTO: 180 THOUSANDS/UL (ref 149–390)
PMV BLD AUTO: 9.1 FL (ref 8.9–12.7)
POTASSIUM SERPL-SCNC: 3.6 MMOL/L (ref 3.5–5.3)
PROT SERPL-MCNC: 6.6 G/DL (ref 6.4–8.2)
RBC # BLD AUTO: 3.56 MILLION/UL (ref 3.81–5.12)
SODIUM SERPL-SCNC: 144 MMOL/L (ref 136–145)
TRIGL SERPL-MCNC: 77 MG/DL
WBC # BLD AUTO: 6.18 THOUSAND/UL (ref 4.31–10.16)

## 2018-11-08 PROCEDURE — 36415 COLL VENOUS BLD VENIPUNCTURE: CPT

## 2018-11-08 PROCEDURE — 80053 COMPREHEN METABOLIC PANEL: CPT

## 2018-11-08 PROCEDURE — 85025 COMPLETE CBC W/AUTO DIFF WBC: CPT

## 2018-11-08 PROCEDURE — 80061 LIPID PANEL: CPT

## 2018-11-20 DIAGNOSIS — E78.2 MIXED HYPERLIPIDEMIA: ICD-10-CM

## 2018-11-20 RX ORDER — NIACIN 1000 MG/1
TABLET, EXTENDED RELEASE ORAL
Qty: 30 TABLET | Refills: 0 | Status: SHIPPED | OUTPATIENT
Start: 2018-11-20 | End: 2018-12-24 | Stop reason: SDUPTHER

## 2018-11-28 DIAGNOSIS — K21.9 GASTROESOPHAGEAL REFLUX DISEASE, ESOPHAGITIS PRESENCE NOT SPECIFIED: ICD-10-CM

## 2018-11-28 RX ORDER — PANTOPRAZOLE SODIUM 40 MG/1
TABLET, DELAYED RELEASE ORAL
Qty: 30 TABLET | Refills: 5 | Status: SHIPPED | OUTPATIENT
Start: 2018-11-28 | End: 2019-05-29 | Stop reason: SDUPTHER

## 2018-11-30 ENCOUNTER — OFFICE VISIT (OUTPATIENT)
Dept: INTERNAL MEDICINE CLINIC | Facility: CLINIC | Age: 83
End: 2018-11-30
Payer: COMMERCIAL

## 2018-11-30 VITALS
HEART RATE: 84 BPM | HEIGHT: 61 IN | OXYGEN SATURATION: 98 % | SYSTOLIC BLOOD PRESSURE: 132 MMHG | WEIGHT: 164.8 LBS | BODY MASS INDEX: 31.11 KG/M2 | DIASTOLIC BLOOD PRESSURE: 72 MMHG

## 2018-11-30 DIAGNOSIS — K21.9 GASTROESOPHAGEAL REFLUX DISEASE, ESOPHAGITIS PRESENCE NOT SPECIFIED: ICD-10-CM

## 2018-11-30 DIAGNOSIS — M70.62 TROCHANTERIC BURSITIS OF LEFT HIP: Primary | ICD-10-CM

## 2018-11-30 DIAGNOSIS — Z23 NEED FOR PNEUMOCOCCAL VACCINATION: ICD-10-CM

## 2018-11-30 DIAGNOSIS — E78.2 MIXED HYPERLIPIDEMIA: ICD-10-CM

## 2018-11-30 DIAGNOSIS — Z23 NEED FOR INFLUENZA VACCINATION: ICD-10-CM

## 2018-11-30 DIAGNOSIS — I10 HYPERTENSION, ESSENTIAL, BENIGN: ICD-10-CM

## 2018-11-30 DIAGNOSIS — I25.10 CAD IN NATIVE ARTERY: ICD-10-CM

## 2018-11-30 PROCEDURE — 99214 OFFICE O/P EST MOD 30 MIN: CPT | Performed by: INTERNAL MEDICINE

## 2018-11-30 PROCEDURE — 3008F BODY MASS INDEX DOCD: CPT | Performed by: INTERNAL MEDICINE

## 2018-11-30 NOTE — PROGRESS NOTES
Assessment/Plan:    Esophageal reflux  Controlled on PPI and H2 blocker  Ulcer free diet    CAD in native artery  On ASA, lipids controlled on statin    Hypertension, essential, benign  Controlled    Bursitis of left hip  Advised to schedule an appt with Dr Nilo Sandhu         Problem List Items Addressed This Visit        Digestive    Esophageal reflux     Controlled on PPI and H2 blocker  Ulcer free diet            Cardiovascular and Mediastinum    CAD in native artery     On ASA, lipids controlled on statin         Relevant Orders    CBC and differential    Comprehensive metabolic panel    Lipid panel    Hypertension, essential, benign     Controlled         Relevant Orders    CBC and differential    Comprehensive metabolic panel       Musculoskeletal and Integument    Bursitis of left hip - Primary     Advised to schedule an appt with Dr Morena Swan referral to Orthopedic Surgery       Other    Hyperlipidemia      Other Visit Diagnoses     Need for influenza vaccination        Need for pneumococcal vaccination                Subjective:      Patient ID: aMrkos Greenfield is a 80 y o  female  HPI  Left hip radiating to the side of the leg  This is not entirely new but it did not use to radiate to her leg  She has been diagnosed with bursitis and has had a injections from Dr Nilo Sandhu, the last was 6 months ago (?)    Also with chronic right shoulder pain, limited ROM  Recent labs hgb 11 5 from 11 2 , lipids controlled LDL 71,   She has seen her cardiologist and no changes were made to her regimen    The following portions of the patient's history were reviewed and updated as appropriate: allergies, current medications, past family history, past social history, past surgical history and problem list     Review of Systems   Constitutional: Negative for fatigue, fever and unexpected weight change  HENT: Negative for ear pain, hearing loss, sinus pain, sinus pressure and sore throat  Respiratory: Negative for cough, shortness of breath and wheezing  Cardiovascular: Negative for chest pain, palpitations and leg swelling  Gastrointestinal: Negative for abdominal pain, constipation, diarrhea, nausea and vomiting  Musculoskeletal: Positive for arthralgias (see hpi)  Negative for myalgias  Neurological: Negative for dizziness and headaches  Objective:      /72   Pulse 84   Ht 5' 1" (1 549 m)   Wt 74 8 kg (164 lb 12 8 oz)   SpO2 98%   BMI 31 14 kg/m²          Physical Exam   Constitutional: She is oriented to person, place, and time  She appears well-developed and well-nourished  HENT:   Head: Normocephalic and atraumatic  Right Ear: External ear normal    Left Ear: External ear normal    Mouth/Throat: Oropharynx is clear and moist    Eyes: Conjunctivae are normal    Neck: Neck supple  Cardiovascular: Normal rate, regular rhythm and normal heart sounds  No murmur heard  Pulmonary/Chest: Effort normal and breath sounds normal  No respiratory distress  She has no wheezes  She has no rales  Abdominal: Soft  Bowel sounds are normal  She exhibits no distension and no mass  There is no tenderness  There is no rebound and no guarding  Musculoskeletal: Normal range of motion  Neurological: She is alert and oriented to person, place, and time  Skin: Skin is warm and dry  Psychiatric: She has a normal mood and affect   Her behavior is normal  Judgment and thought content normal

## 2018-12-03 ENCOUNTER — TELEPHONE (OUTPATIENT)
Dept: INTERNAL MEDICINE CLINIC | Facility: CLINIC | Age: 83
End: 2018-12-03

## 2018-12-03 DIAGNOSIS — Z12.31 ENCOUNTER FOR SCREENING MAMMOGRAM FOR BREAST CANCER: Primary | ICD-10-CM

## 2018-12-19 ENCOUNTER — TELEPHONE (OUTPATIENT)
Dept: INTERNAL MEDICINE CLINIC | Facility: CLINIC | Age: 83
End: 2018-12-19

## 2018-12-19 DIAGNOSIS — J06.9 VIRAL UPPER RESPIRATORY ILLNESS: ICD-10-CM

## 2018-12-19 RX ORDER — BENZONATATE 100 MG/1
100 CAPSULE ORAL 3 TIMES DAILY PRN
Qty: 30 CAPSULE | Refills: 0 | Status: SHIPPED | OUTPATIENT
Start: 2018-12-19 | End: 2019-04-17 | Stop reason: SINTOL

## 2018-12-24 DIAGNOSIS — E78.2 MIXED HYPERLIPIDEMIA: ICD-10-CM

## 2018-12-25 RX ORDER — NIACIN 1000 MG/1
TABLET, EXTENDED RELEASE ORAL
Qty: 30 TABLET | Refills: 4 | Status: SHIPPED | OUTPATIENT
Start: 2018-12-25 | End: 2019-04-30 | Stop reason: SDUPTHER

## 2018-12-25 RX ORDER — SIMVASTATIN 40 MG
TABLET ORAL
Qty: 30 TABLET | Refills: 4 | Status: SHIPPED | OUTPATIENT
Start: 2018-12-25 | End: 2019-05-29 | Stop reason: SDUPTHER

## 2019-01-27 DIAGNOSIS — K21.9 GASTROESOPHAGEAL REFLUX DISEASE, ESOPHAGITIS PRESENCE NOT SPECIFIED: ICD-10-CM

## 2019-01-27 RX ORDER — FAMOTIDINE 20 MG/1
TABLET, FILM COATED ORAL
Qty: 30 TABLET | Refills: 5 | Status: SHIPPED | OUTPATIENT
Start: 2019-01-27 | End: 2019-07-28 | Stop reason: SDUPTHER

## 2019-03-20 ENCOUNTER — OFFICE VISIT (OUTPATIENT)
Dept: INTERNAL MEDICINE CLINIC | Facility: CLINIC | Age: 84
End: 2019-03-20
Payer: COMMERCIAL

## 2019-03-20 VITALS
SYSTOLIC BLOOD PRESSURE: 112 MMHG | BODY MASS INDEX: 31.26 KG/M2 | WEIGHT: 165.6 LBS | TEMPERATURE: 98.7 F | HEIGHT: 61 IN | DIASTOLIC BLOOD PRESSURE: 74 MMHG | HEART RATE: 83 BPM | OXYGEN SATURATION: 98 %

## 2019-03-20 DIAGNOSIS — J06.9 UPPER RESPIRATORY TRACT INFECTION, UNSPECIFIED TYPE: Primary | ICD-10-CM

## 2019-03-20 PROCEDURE — 87631 RESP VIRUS 3-5 TARGETS: CPT | Performed by: NURSE PRACTITIONER

## 2019-03-20 PROCEDURE — 99213 OFFICE O/P EST LOW 20 MIN: CPT | Performed by: NURSE PRACTITIONER

## 2019-03-20 RX ORDER — DOXYCYCLINE 100 MG/1
100 CAPSULE ORAL 2 TIMES DAILY
Qty: 14 CAPSULE | Refills: 0 | Status: SHIPPED | OUTPATIENT
Start: 2019-03-20 | End: 2019-03-27

## 2019-03-20 NOTE — PROGRESS NOTES
Assessment/Plan:    Upper respiratory tract infection  Fluids and rest  Will check for flu       Diagnoses and all orders for this visit:    Upper respiratory tract infection, unspecified type  -     doxycycline monohydrate (MONODOX) 100 mg capsule; Take 1 capsule (100 mg total) by mouth 2 (two) times a day for 7 days  -     INFLUENZA A/B AND RSV, PCR; Future  -     INFLUENZA A/B AND RSV, PCR          Subjective:      Patient ID: Oanh Mejia is a 80 y o  female  Since Monday has been having a lot of cough  Runny nose       Chest tightness, sob with cough  Chills and body ache  No mucous  No fever but has been sweating      The following portions of the patient's history were reviewed and updated as appropriate: allergies, current medications, past family history, past medical history, past social history, past surgical history and problem list     Review of Systems   Constitutional: Positive for chills and fatigue  HENT: Positive for postnasal drip and rhinorrhea  Respiratory: Positive for cough and chest tightness  Musculoskeletal: Positive for myalgias  Objective:      /74   Pulse 83   Temp 98 7 °F (37 1 °C)   Ht 5' 1" (1 549 m)   Wt 75 1 kg (165 lb 9 6 oz)   SpO2 98%   BMI 31 29 kg/m²          Physical Exam   Constitutional: She is oriented to person, place, and time  She appears well-developed and well-nourished  HENT:   Head: Normocephalic and atraumatic  Eyes: Pupils are equal, round, and reactive to light  Conjunctivae are normal    Neck: Normal range of motion  Neck supple  Cardiovascular: Normal rate and regular rhythm  Pulmonary/Chest: Effort normal and breath sounds normal    Abdominal: Soft  Bowel sounds are normal    Musculoskeletal: Normal range of motion  Neurological: She is alert and oriented to person, place, and time  Skin: Skin is warm and dry

## 2019-03-21 LAB
FLUAV AG SPEC QL: NOT DETECTED
FLUBV AG SPEC QL: NOT DETECTED
RSV B RNA SPEC QL NAA+PROBE: NOT DETECTED

## 2019-04-09 ENCOUNTER — APPOINTMENT (OUTPATIENT)
Dept: LAB | Facility: CLINIC | Age: 84
End: 2019-04-09
Payer: COMMERCIAL

## 2019-04-09 DIAGNOSIS — I10 HYPERTENSION, ESSENTIAL, BENIGN: ICD-10-CM

## 2019-04-09 DIAGNOSIS — I25.10 CAD IN NATIVE ARTERY: ICD-10-CM

## 2019-04-09 LAB
ALBUMIN SERPL BCP-MCNC: 3.3 G/DL (ref 3.5–5)
ALP SERPL-CCNC: 104 U/L (ref 46–116)
ALT SERPL W P-5'-P-CCNC: 19 U/L (ref 12–78)
ANION GAP SERPL CALCULATED.3IONS-SCNC: 8 MMOL/L (ref 4–13)
AST SERPL W P-5'-P-CCNC: 24 U/L (ref 5–45)
BASOPHILS # BLD AUTO: 0.03 THOUSANDS/ΜL (ref 0–0.1)
BASOPHILS NFR BLD AUTO: 1 % (ref 0–1)
BILIRUB SERPL-MCNC: 0.8 MG/DL (ref 0.2–1)
BUN SERPL-MCNC: 17 MG/DL (ref 5–25)
CALCIUM SERPL-MCNC: 9.5 MG/DL (ref 8.3–10.1)
CHLORIDE SERPL-SCNC: 108 MMOL/L (ref 100–108)
CHOLEST SERPL-MCNC: 143 MG/DL (ref 50–200)
CO2 SERPL-SCNC: 28 MMOL/L (ref 21–32)
CREAT SERPL-MCNC: 0.85 MG/DL (ref 0.6–1.3)
EOSINOPHIL # BLD AUTO: 0.16 THOUSAND/ΜL (ref 0–0.61)
EOSINOPHIL NFR BLD AUTO: 3 % (ref 0–6)
ERYTHROCYTE [DISTWIDTH] IN BLOOD BY AUTOMATED COUNT: 13.6 % (ref 11.6–15.1)
GFR SERPL CREATININE-BSD FRML MDRD: 64 ML/MIN/1.73SQ M
GLUCOSE P FAST SERPL-MCNC: 104 MG/DL (ref 65–99)
HCT VFR BLD AUTO: 35.6 % (ref 34.8–46.1)
HDLC SERPL-MCNC: 58 MG/DL (ref 40–60)
HGB BLD-MCNC: 11.6 G/DL (ref 11.5–15.4)
IMM GRANULOCYTES # BLD AUTO: 0.01 THOUSAND/UL (ref 0–0.2)
IMM GRANULOCYTES NFR BLD AUTO: 0 % (ref 0–2)
LDLC SERPL CALC-MCNC: 68 MG/DL (ref 0–100)
LYMPHOCYTES # BLD AUTO: 2.38 THOUSANDS/ΜL (ref 0.6–4.47)
LYMPHOCYTES NFR BLD AUTO: 49 % (ref 14–44)
MCH RBC QN AUTO: 32.6 PG (ref 26.8–34.3)
MCHC RBC AUTO-ENTMCNC: 32.6 G/DL (ref 31.4–37.4)
MCV RBC AUTO: 100 FL (ref 82–98)
MONOCYTES # BLD AUTO: 0.38 THOUSAND/ΜL (ref 0.17–1.22)
MONOCYTES NFR BLD AUTO: 8 % (ref 4–12)
NEUTROPHILS # BLD AUTO: 1.93 THOUSANDS/ΜL (ref 1.85–7.62)
NEUTS SEG NFR BLD AUTO: 39 % (ref 43–75)
NONHDLC SERPL-MCNC: 85 MG/DL
NRBC BLD AUTO-RTO: 0 /100 WBCS
PLATELET # BLD AUTO: 150 THOUSANDS/UL (ref 149–390)
PMV BLD AUTO: 9.1 FL (ref 8.9–12.7)
POTASSIUM SERPL-SCNC: 3.9 MMOL/L (ref 3.5–5.3)
PROT SERPL-MCNC: 6.5 G/DL (ref 6.4–8.2)
RBC # BLD AUTO: 3.56 MILLION/UL (ref 3.81–5.12)
SODIUM SERPL-SCNC: 144 MMOL/L (ref 136–145)
TRIGL SERPL-MCNC: 85 MG/DL
WBC # BLD AUTO: 4.89 THOUSAND/UL (ref 4.31–10.16)

## 2019-04-09 PROCEDURE — 36415 COLL VENOUS BLD VENIPUNCTURE: CPT

## 2019-04-09 PROCEDURE — 85025 COMPLETE CBC W/AUTO DIFF WBC: CPT

## 2019-04-09 PROCEDURE — 80053 COMPREHEN METABOLIC PANEL: CPT

## 2019-04-09 PROCEDURE — 80061 LIPID PANEL: CPT

## 2019-04-17 ENCOUNTER — OFFICE VISIT (OUTPATIENT)
Dept: INTERNAL MEDICINE CLINIC | Facility: CLINIC | Age: 84
End: 2019-04-17
Payer: COMMERCIAL

## 2019-04-17 VITALS
HEART RATE: 84 BPM | WEIGHT: 168.8 LBS | DIASTOLIC BLOOD PRESSURE: 64 MMHG | SYSTOLIC BLOOD PRESSURE: 126 MMHG | HEIGHT: 61 IN | OXYGEN SATURATION: 95 % | TEMPERATURE: 98.4 F | RESPIRATION RATE: 16 BRPM | BODY MASS INDEX: 31.87 KG/M2

## 2019-04-17 DIAGNOSIS — K21.9 GASTROESOPHAGEAL REFLUX DISEASE, ESOPHAGITIS PRESENCE NOT SPECIFIED: ICD-10-CM

## 2019-04-17 DIAGNOSIS — I25.10 CAD IN NATIVE ARTERY: ICD-10-CM

## 2019-04-17 DIAGNOSIS — I10 HYPERTENSION, ESSENTIAL, BENIGN: ICD-10-CM

## 2019-04-17 DIAGNOSIS — R06.02 SOB (SHORTNESS OF BREATH): Primary | ICD-10-CM

## 2019-04-17 DIAGNOSIS — E78.2 MIXED HYPERLIPIDEMIA: ICD-10-CM

## 2019-04-17 DIAGNOSIS — J06.9 UPPER RESPIRATORY TRACT INFECTION, UNSPECIFIED TYPE: ICD-10-CM

## 2019-04-17 PROCEDURE — 3725F SCREEN DEPRESSION PERFORMED: CPT | Performed by: INTERNAL MEDICINE

## 2019-04-17 PROCEDURE — 3074F SYST BP LT 130 MM HG: CPT | Performed by: INTERNAL MEDICINE

## 2019-04-17 PROCEDURE — 1160F RVW MEDS BY RX/DR IN RCRD: CPT | Performed by: INTERNAL MEDICINE

## 2019-04-17 PROCEDURE — 99214 OFFICE O/P EST MOD 30 MIN: CPT | Performed by: INTERNAL MEDICINE

## 2019-04-17 PROCEDURE — 1101F PT FALLS ASSESS-DOCD LE1/YR: CPT | Performed by: INTERNAL MEDICINE

## 2019-04-17 PROCEDURE — 3078F DIAST BP <80 MM HG: CPT | Performed by: INTERNAL MEDICINE

## 2019-04-18 PROBLEM — J06.9 UPPER RESPIRATORY TRACT INFECTION: Status: RESOLVED | Noted: 2019-03-20 | Resolved: 2019-04-18

## 2019-04-30 DIAGNOSIS — E78.2 MIXED HYPERLIPIDEMIA: ICD-10-CM

## 2019-04-30 RX ORDER — NIACIN 1000 MG/1
TABLET, EXTENDED RELEASE ORAL
Qty: 30 TABLET | Refills: 6 | Status: SHIPPED | OUTPATIENT
Start: 2019-04-30 | End: 2019-11-25 | Stop reason: SDUPTHER

## 2019-05-08 ENCOUNTER — HOSPITAL ENCOUNTER (OUTPATIENT)
Dept: MAMMOGRAPHY | Facility: HOSPITAL | Age: 84
Discharge: HOME | End: 2019-05-08
Payer: COMMERCIAL

## 2019-05-08 VITALS — BODY MASS INDEX: 31.72 KG/M2 | WEIGHT: 168 LBS | HEIGHT: 61 IN

## 2019-05-08 DIAGNOSIS — Z12.31 ENCOUNTER FOR SCREENING MAMMOGRAM FOR BREAST CANCER: ICD-10-CM

## 2019-05-08 PROCEDURE — 77067 SCR MAMMO BI INCL CAD: CPT

## 2019-05-29 DIAGNOSIS — K21.9 GASTROESOPHAGEAL REFLUX DISEASE, ESOPHAGITIS PRESENCE NOT SPECIFIED: ICD-10-CM

## 2019-05-29 DIAGNOSIS — E78.2 MIXED HYPERLIPIDEMIA: ICD-10-CM

## 2019-05-29 RX ORDER — SIMVASTATIN 40 MG
TABLET ORAL
Qty: 30 TABLET | Refills: 5 | Status: SHIPPED | OUTPATIENT
Start: 2019-05-29 | End: 2019-11-25 | Stop reason: SDUPTHER

## 2019-05-29 RX ORDER — PANTOPRAZOLE SODIUM 40 MG/1
TABLET, DELAYED RELEASE ORAL
Qty: 30 TABLET | Refills: 5 | Status: SHIPPED | OUTPATIENT
Start: 2019-05-29 | End: 2019-11-25 | Stop reason: SDUPTHER

## 2019-07-28 DIAGNOSIS — K21.9 GASTROESOPHAGEAL REFLUX DISEASE, ESOPHAGITIS PRESENCE NOT SPECIFIED: ICD-10-CM

## 2019-07-29 RX ORDER — FAMOTIDINE 20 MG/1
TABLET, FILM COATED ORAL
Qty: 30 TABLET | Refills: 5 | Status: SHIPPED | OUTPATIENT
Start: 2019-07-29 | End: 2020-01-24

## 2019-09-19 ENCOUNTER — APPOINTMENT (OUTPATIENT)
Dept: LAB | Facility: CLINIC | Age: 84
End: 2019-09-19
Payer: COMMERCIAL

## 2019-09-19 DIAGNOSIS — I10 HYPERTENSION, ESSENTIAL, BENIGN: ICD-10-CM

## 2019-09-19 DIAGNOSIS — E78.2 MIXED HYPERLIPIDEMIA: ICD-10-CM

## 2019-09-19 LAB
ALBUMIN SERPL BCP-MCNC: 3.5 G/DL (ref 3.5–5)
ALP SERPL-CCNC: 107 U/L (ref 46–116)
ALT SERPL W P-5'-P-CCNC: 19 U/L (ref 12–78)
ANION GAP SERPL CALCULATED.3IONS-SCNC: 7 MMOL/L (ref 4–13)
AST SERPL W P-5'-P-CCNC: 28 U/L (ref 5–45)
BASOPHILS # BLD AUTO: 0.05 THOUSANDS/ΜL (ref 0–0.1)
BASOPHILS NFR BLD AUTO: 1 % (ref 0–1)
BILIRUB SERPL-MCNC: 0.9 MG/DL (ref 0.2–1)
BUN SERPL-MCNC: 12 MG/DL (ref 5–25)
CALCIUM SERPL-MCNC: 9.4 MG/DL (ref 8.3–10.1)
CHLORIDE SERPL-SCNC: 105 MMOL/L (ref 100–108)
CHOLEST SERPL-MCNC: 155 MG/DL (ref 50–200)
CO2 SERPL-SCNC: 28 MMOL/L (ref 21–32)
CREAT SERPL-MCNC: 0.88 MG/DL (ref 0.6–1.3)
EOSINOPHIL # BLD AUTO: 0.13 THOUSAND/ΜL (ref 0–0.61)
EOSINOPHIL NFR BLD AUTO: 2 % (ref 0–6)
ERYTHROCYTE [DISTWIDTH] IN BLOOD BY AUTOMATED COUNT: 13.3 % (ref 11.6–15.1)
GFR SERPL CREATININE-BSD FRML MDRD: 61 ML/MIN/1.73SQ M
GLUCOSE P FAST SERPL-MCNC: 103 MG/DL (ref 65–99)
HCT VFR BLD AUTO: 37.6 % (ref 34.8–46.1)
HDLC SERPL-MCNC: 70 MG/DL (ref 40–60)
HGB BLD-MCNC: 12 G/DL (ref 11.5–15.4)
IMM GRANULOCYTES # BLD AUTO: 0.01 THOUSAND/UL (ref 0–0.2)
IMM GRANULOCYTES NFR BLD AUTO: 0 % (ref 0–2)
LDLC SERPL CALC-MCNC: 70 MG/DL (ref 0–100)
LYMPHOCYTES # BLD AUTO: 2.78 THOUSANDS/ΜL (ref 0.6–4.47)
LYMPHOCYTES NFR BLD AUTO: 44 % (ref 14–44)
MCH RBC QN AUTO: 32.9 PG (ref 26.8–34.3)
MCHC RBC AUTO-ENTMCNC: 31.9 G/DL (ref 31.4–37.4)
MCV RBC AUTO: 103 FL (ref 82–98)
MONOCYTES # BLD AUTO: 0.69 THOUSAND/ΜL (ref 0.17–1.22)
MONOCYTES NFR BLD AUTO: 11 % (ref 4–12)
NEUTROPHILS # BLD AUTO: 2.61 THOUSANDS/ΜL (ref 1.85–7.62)
NEUTS SEG NFR BLD AUTO: 42 % (ref 43–75)
NONHDLC SERPL-MCNC: 85 MG/DL
NRBC BLD AUTO-RTO: 0 /100 WBCS
PLATELET # BLD AUTO: 166 THOUSANDS/UL (ref 149–390)
PMV BLD AUTO: 9.5 FL (ref 8.9–12.7)
POTASSIUM SERPL-SCNC: 4.1 MMOL/L (ref 3.5–5.3)
PROT SERPL-MCNC: 6.6 G/DL (ref 6.4–8.2)
RBC # BLD AUTO: 3.65 MILLION/UL (ref 3.81–5.12)
SODIUM SERPL-SCNC: 140 MMOL/L (ref 136–145)
TRIGL SERPL-MCNC: 73 MG/DL
WBC # BLD AUTO: 6.27 THOUSAND/UL (ref 4.31–10.16)

## 2019-09-19 PROCEDURE — 36415 COLL VENOUS BLD VENIPUNCTURE: CPT

## 2019-09-19 PROCEDURE — 80053 COMPREHEN METABOLIC PANEL: CPT

## 2019-09-19 PROCEDURE — 80061 LIPID PANEL: CPT

## 2019-09-19 PROCEDURE — 85025 COMPLETE CBC W/AUTO DIFF WBC: CPT

## 2019-09-26 ENCOUNTER — OFFICE VISIT (OUTPATIENT)
Dept: INTERNAL MEDICINE CLINIC | Facility: CLINIC | Age: 84
End: 2019-09-26
Payer: COMMERCIAL

## 2019-09-26 VITALS
BODY MASS INDEX: 32.66 KG/M2 | HEART RATE: 78 BPM | DIASTOLIC BLOOD PRESSURE: 64 MMHG | HEIGHT: 61 IN | WEIGHT: 173 LBS | TEMPERATURE: 99.8 F | RESPIRATION RATE: 18 BRPM | SYSTOLIC BLOOD PRESSURE: 132 MMHG | OXYGEN SATURATION: 98 %

## 2019-09-26 DIAGNOSIS — E66.09 CLASS 1 OBESITY DUE TO EXCESS CALORIES WITHOUT SERIOUS COMORBIDITY WITH BODY MASS INDEX (BMI) OF 32.0 TO 32.9 IN ADULT: ICD-10-CM

## 2019-09-26 DIAGNOSIS — L60.0 INGROWING TOENAIL WITH INFECTION: ICD-10-CM

## 2019-09-26 DIAGNOSIS — J06.9 UPPER RESPIRATORY TRACT INFECTION, UNSPECIFIED TYPE: Primary | ICD-10-CM

## 2019-09-26 PROBLEM — E66.811 CLASS 1 OBESITY DUE TO EXCESS CALORIES WITHOUT SERIOUS COMORBIDITY WITH BODY MASS INDEX (BMI) OF 32.0 TO 32.9 IN ADULT: Status: ACTIVE | Noted: 2019-09-26

## 2019-09-26 PROCEDURE — 99213 OFFICE O/P EST LOW 20 MIN: CPT | Performed by: NURSE PRACTITIONER

## 2019-09-26 RX ORDER — DOXYCYCLINE HYCLATE 100 MG/1
100 CAPSULE ORAL EVERY 8 HOURS
Qty: 21 CAPSULE | Refills: 0 | Status: SHIPPED | OUTPATIENT
Start: 2019-09-26 | End: 2019-09-26 | Stop reason: SDUPTHER

## 2019-09-26 RX ORDER — DOXYCYCLINE HYCLATE 100 MG/1
100 CAPSULE ORAL EVERY 12 HOURS SCHEDULED
Qty: 14 CAPSULE | Refills: 0 | Status: SHIPPED | OUTPATIENT
Start: 2019-09-26 | End: 2019-10-03

## 2019-09-26 RX ORDER — BENZONATATE 200 MG/1
200 CAPSULE ORAL 3 TIMES DAILY PRN
Qty: 20 CAPSULE | Refills: 0 | Status: SHIPPED | OUTPATIENT
Start: 2019-09-26 | End: 2019-10-16 | Stop reason: ALTCHOICE

## 2019-09-26 NOTE — ASSESSMENT & PLAN NOTE
BMI Counseling: Body mass index is 32 69 kg/m²  The BMI is above normal  Nutrition recommendations include decreasing overall calorie intake and 3-5 servings of fruits/vegetables daily  Exercise recommendations include exercising 3-5 times per week

## 2019-09-26 NOTE — PROGRESS NOTES
Assessment/Plan:    Class 1 obesity due to excess calories without serious comorbidity with body mass index (BMI) of 32 0 to 32 9 in adult  BMI Counseling: Body mass index is 32 69 kg/m²  The BMI is above normal  Nutrition recommendations include decreasing overall calorie intake and 3-5 servings of fruits/vegetables daily  Exercise recommendations include exercising 3-5 times per week  Upper respiratory tract infection  Drink plenty of fluids and rest  May take over the counter mucinex and cough syrup/cough drops  Call if worsening  Start antibiotics    Ingrowing toenail with infection  Refer to podiatrist       Diagnoses and all orders for this visit:    Upper respiratory tract infection, unspecified type  -     Discontinue: doxycycline hyclate (VIBRAMYCIN) 100 mg capsule; Take 1 capsule (100 mg total) by mouth every 8 (eight) hours for 7 days  -     benzonatate (TESSALON) 200 MG capsule; Take 1 capsule (200 mg total) by mouth 3 (three) times a day as needed for cough  -     doxycycline hyclate (VIBRAMYCIN) 100 mg capsule; Take 1 capsule (100 mg total) by mouth every 12 (twelve) hours for 7 days    Class 1 obesity due to excess calories without serious comorbidity with body mass index (BMI) of 32 0 to 32 9 in adult    Ingrowing toenail with infection  -     Ambulatory referral to Podiatry; Future    Other orders  -     Cancel: PNEUMOCOCCAL CONJUGATE VACCINE 13-VALENT GREATER THAN 6 MONTHS          Subjective:      Patient ID: John Casey is a 80 y o  female  Patient is here for URI and left big toe ingrown toenail    URI    This is a new problem  The current episode started in the past 7 days  The problem has been unchanged  There has been no fever  Associated symptoms include congestion, coughing and a sore throat         The following portions of the patient's history were reviewed and updated as appropriate: allergies, current medications, past family history, past medical history, past social history, past surgical history and problem list     Review of Systems   Constitutional: Negative  HENT: Positive for congestion, postnasal drip and sore throat  Eyes: Negative  Respiratory: Positive for cough  Cardiovascular: Negative  Gastrointestinal: Negative  Musculoskeletal: Negative  Neurological: Negative  Objective:      /64   Pulse 78   Temp 99 8 °F (37 7 °C) (Tympanic)   Resp 18   Ht 5' 1" (1 549 m)   Wt 78 5 kg (173 lb)   SpO2 98%   BMI 32 69 kg/m²          Physical Exam   Constitutional: She is oriented to person, place, and time  She appears well-developed and well-nourished  HENT:   Head: Normocephalic and atraumatic  Right Ear: External ear normal    Left Ear: External ear normal    Nose: Nose normal    Mouth/Throat: Oropharynx is clear and moist    Eyes: Pupils are equal, round, and reactive to light  Conjunctivae are normal    Neck: Normal range of motion  Neck supple  Cardiovascular: Normal rate and regular rhythm  Pulmonary/Chest: Effort normal and breath sounds normal    Abdominal: Soft  Bowel sounds are normal    Musculoskeletal: Normal range of motion  Neurological: She is alert and oriented to person, place, and time  Skin: Skin is warm and dry  Nursing note and vitals reviewed

## 2019-10-16 ENCOUNTER — TELEPHONE (OUTPATIENT)
Dept: INTERNAL MEDICINE CLINIC | Facility: CLINIC | Age: 84
End: 2019-10-16

## 2019-10-16 ENCOUNTER — OFFICE VISIT (OUTPATIENT)
Dept: INTERNAL MEDICINE CLINIC | Facility: CLINIC | Age: 84
End: 2019-10-16
Payer: COMMERCIAL

## 2019-10-16 VITALS
HEART RATE: 70 BPM | TEMPERATURE: 98.4 F | DIASTOLIC BLOOD PRESSURE: 60 MMHG | HEIGHT: 61 IN | BODY MASS INDEX: 33.12 KG/M2 | OXYGEN SATURATION: 98 % | SYSTOLIC BLOOD PRESSURE: 118 MMHG | RESPIRATION RATE: 16 BRPM | WEIGHT: 175.4 LBS

## 2019-10-16 DIAGNOSIS — K21.9 GASTROESOPHAGEAL REFLUX DISEASE, ESOPHAGITIS PRESENCE NOT SPECIFIED: ICD-10-CM

## 2019-10-16 DIAGNOSIS — I25.10 CAD IN NATIVE ARTERY: ICD-10-CM

## 2019-10-16 DIAGNOSIS — G89.29 CHRONIC RIGHT SHOULDER PAIN: Primary | ICD-10-CM

## 2019-10-16 DIAGNOSIS — M25.511 CHRONIC RIGHT SHOULDER PAIN: Primary | ICD-10-CM

## 2019-10-16 DIAGNOSIS — E66.09 CLASS 1 OBESITY DUE TO EXCESS CALORIES WITHOUT SERIOUS COMORBIDITY WITH BODY MASS INDEX (BMI) OF 32.0 TO 32.9 IN ADULT: ICD-10-CM

## 2019-10-16 DIAGNOSIS — Z00.00 MEDICARE ANNUAL WELLNESS VISIT, SUBSEQUENT: ICD-10-CM

## 2019-10-16 DIAGNOSIS — I10 HYPERTENSION, ESSENTIAL, BENIGN: ICD-10-CM

## 2019-10-16 DIAGNOSIS — E78.2 MIXED HYPERLIPIDEMIA: ICD-10-CM

## 2019-10-16 DIAGNOSIS — J06.9 UPPER RESPIRATORY TRACT INFECTION, UNSPECIFIED TYPE: ICD-10-CM

## 2019-10-16 DIAGNOSIS — G89.29 CHRONIC BILATERAL LOW BACK PAIN WITHOUT SCIATICA: ICD-10-CM

## 2019-10-16 DIAGNOSIS — I25.10 CORONARY ARTERY DISEASE INVOLVING NATIVE CORONARY ARTERY OF NATIVE HEART, ANGINA PRESENCE UNSPECIFIED: ICD-10-CM

## 2019-10-16 DIAGNOSIS — M54.50 CHRONIC BILATERAL LOW BACK PAIN WITHOUT SCIATICA: ICD-10-CM

## 2019-10-16 PROCEDURE — 99214 OFFICE O/P EST MOD 30 MIN: CPT | Performed by: INTERNAL MEDICINE

## 2019-10-16 PROCEDURE — G0439 PPPS, SUBSEQ VISIT: HCPCS | Performed by: INTERNAL MEDICINE

## 2019-10-16 PROCEDURE — 1170F FXNL STATUS ASSESSED: CPT | Performed by: INTERNAL MEDICINE

## 2019-10-16 PROCEDURE — 1125F AMNT PAIN NOTED PAIN PRSNT: CPT | Performed by: INTERNAL MEDICINE

## 2019-10-16 RX ORDER — NITROGLYCERIN 0.4 MG/1
0.4 TABLET SUBLINGUAL
Qty: 25 TABLET | Refills: 0 | Status: SHIPPED | OUTPATIENT
Start: 2019-10-16 | End: 2021-03-11 | Stop reason: SDUPTHER

## 2019-10-16 NOTE — PROGRESS NOTES
Assessment/Plan:    CAD in native artery  On ASA and statin  BP controlled without meds    Hyperlipidemia  Controlled on statin    Class 1 obesity due to excess calories without serious comorbidity with body mass index (BMI) of 32 0 to 32 9 in adult  BMI Counseling: Body mass index is 33 14 kg/m²  The BMI is above normal  Nutrition recommendations include moderation in carbohydrate intake  BMI Counseling: Body mass index is 33 14 kg/m²  The BMI is above normal  Exercise recommendations include exercising 3-5 times per week  Esophageal reflux  Needs to make dietary changes  Cont pantoprazole and Pepcid         Problem List Items Addressed This Visit        Digestive    Esophageal reflux     Needs to make dietary changes  Cont pantoprazole and Pepcid            Respiratory    RESOLVED: Upper respiratory tract infection       Cardiovascular and Mediastinum    Hypertension, essential, benign    Relevant Orders    CBC    Comprehensive metabolic panel    Lipid panel    CAD in native artery     On ASA and statin  BP controlled without meds         Relevant Medications    nitroglycerin (NITROSTAT) 0 4 mg SL tablet       Other    Hyperlipidemia     Controlled on statin         Relevant Orders    Lipid panel    Class 1 obesity due to excess calories without serious comorbidity with body mass index (BMI) of 32 0 to 32 9 in adult     BMI Counseling: Body mass index is 33 14 kg/m²  The BMI is above normal  Nutrition recommendations include moderation in carbohydrate intake  BMI Counseling: Body mass index is 33 14 kg/m²  The BMI is above normal  Exercise recommendations include exercising 3-5 times per week                 Other Visit Diagnoses     Chronic right shoulder pain    -  Primary    Relevant Orders    Ambulatory referral to Orthopedic Surgery    Ambulatory referral to Physical Therapy    Coronary artery disease involving native coronary artery of native heart, angina presence unspecified        Relevant Medications    nitroglycerin (NITROSTAT) 0 4 mg SL tablet    Other Relevant Orders    CBC    Comprehensive metabolic panel    Lipid panel    Chronic bilateral low back pain without sciatica        Relevant Orders    Ambulatory referral to Physical Therapy    Medicare annual wellness visit, subsequent                Subjective:      Patient ID: Luigi Rich is a 80 y o  female  HPI  Here with her daughter  She was seen 3 weeks ago for a URI and this is much better  She has some SOB with exertion which is not new  C/o right shoulder pain for months, limited ROM  She saw ortho for this in the past and was told she has a torn rotator cuff but she had full ROM  Recent labs-LDL 70, controlled , rest of CMP, CBCd normal  Diet is poor  She is sedentary  Gets breakthrough heartburn on PPI and Pepcid depends on what she eats    The following portions of the patient's history were reviewed and updated as appropriate: allergies, current medications, past medical history, past social history, past surgical history and problem list     Review of Systems   Constitutional: Negative for fatigue, fever and unexpected weight change  HENT: Negative for ear pain, hearing loss, sinus pressure, sinus pain and sore throat  Respiratory: Positive for shortness of breath  Negative for cough and wheezing  Cardiovascular: Negative for chest pain, palpitations and leg swelling  Gastrointestinal: Negative for abdominal pain, constipation, diarrhea, nausea and vomiting  Genitourinary: Negative for difficulty urinating  Musculoskeletal: Positive for arthralgias  Negative for myalgias  Neurological: Negative for dizziness and headaches  Objective:      /60   Pulse 70   Temp 98 4 °F (36 9 °C) (Oral)   Resp 16   Ht 5' 1" (1 549 m)   Wt 79 6 kg (175 lb 6 4 oz)   SpO2 98%   BMI 33 14 kg/m²          Physical Exam   Constitutional: She is oriented to person, place, and time   She appears well-developed and well-nourished  HENT:   Head: Normocephalic and atraumatic  Right Ear: External ear normal    Left Ear: External ear normal    Mouth/Throat: Oropharynx is clear and moist    Eyes: Conjunctivae are normal    Neck: Neck supple  Cardiovascular: Normal rate, regular rhythm and normal heart sounds  No murmur heard  Pulmonary/Chest: Effort normal and breath sounds normal  No respiratory distress  She has no wheezes  She has no rales  Abdominal: Soft  She exhibits no distension and no mass  There is no tenderness  There is no rebound and no guarding  Musculoskeletal:   Right shoulder tenderness at the [facility] joint  Limited ROM , cannot raise shoulder past 90 degrees   Neurological: She is alert and oriented to person, place, and time  Skin: Skin is warm and dry  Psychiatric: She has a normal mood and affect   Her behavior is normal  Judgment and thought content normal

## 2019-10-16 NOTE — PROGRESS NOTES
Assessment and Plan:     Problem List Items Addressed This Visit        Digestive    Esophageal reflux     Needs to make dietary changes  Cont pantoprazole and Pepcid            Respiratory    RESOLVED: Upper respiratory tract infection       Cardiovascular and Mediastinum    Hypertension, essential, benign    Relevant Orders    CBC    Comprehensive metabolic panel    Lipid panel    CAD in native artery     On ASA and statin  BP controlled without meds         Relevant Medications    nitroglycerin (NITROSTAT) 0 4 mg SL tablet       Other    Hyperlipidemia     Controlled on statin         Relevant Orders    Lipid panel    Class 1 obesity due to excess calories without serious comorbidity with body mass index (BMI) of 32 0 to 32 9 in adult     BMI Counseling: Body mass index is 33 14 kg/m²  The BMI is above normal  Nutrition recommendations include moderation in carbohydrate intake  BMI Counseling: Body mass index is 33 14 kg/m²  The BMI is above normal  Exercise recommendations include exercising 3-5 times per week  Other Visit Diagnoses     Chronic right shoulder pain    -  Primary    Relevant Orders    Ambulatory referral to Orthopedic Surgery    Ambulatory referral to Physical Therapy    Coronary artery disease involving native coronary artery of native heart, angina presence unspecified        Relevant Medications    nitroglycerin (NITROSTAT) 0 4 mg SL tablet    Other Relevant Orders    CBC    Comprehensive metabolic panel    Lipid panel    Chronic bilateral low back pain without sciatica        Relevant Orders    Ambulatory referral to Physical Therapy    Medicare annual wellness visit, subsequent               Preventive health issues were discussed with patient, and age appropriate screening tests were ordered as noted in patient's After Visit Summary    Personalized health advice and appropriate referrals for health education or preventive services given if needed, as noted in patient's After Visit Summary       History of Present Illness:     Patient presents for Medicare Annual Wellness visit    Patient Care Team:  Ronnie Fuentes MD as PCP - General  DO Geovanni Leo MD Wilford Nares, MD Thornton Amos, MD Renella Horde Od (Optometry)     Problem List:     Patient Active Problem List   Diagnosis    Ambulatory dysfunction    H/O right knee surgery    CAD in native artery    History of cardiac catheterization    Hyperlipidemia    Multiple allergies    Bursitis of left hip    Osteoporosis    Esophageal reflux    Hypertension, essential, benign    Class 1 obesity due to excess calories without serious comorbidity with body mass index (BMI) of 32 0 to 32 9 in adult    Ingrowing toenail with infection      Past Medical and Surgical History:     Past Medical History:   Diagnosis Date    Arthritis     Cardiac disease     Hyperlipidemia     Hypertension     UTI (urinary tract infection) 5/31/2017     Past Surgical History:   Procedure Laterality Date    APPENDECTOMY      CORONARY ANGIOPLASTY WITH STENT PLACEMENT      CYSTOSCOPY  12/29/2014    Diagnostic    HERNIA REPAIR      JOINT REPLACEMENT      bilat knee, bilat hip, L shoulder    KNEE SURGERY Bilateral     SHOULDER SURGERY  2012    TOTAL HIP ARTHROPLASTY Bilateral       Family History:     Family History   Problem Relation Age of Onset   Jody Credit Breast cancer Mother 80    Leukemia Mother     Gout Mother     Hypertension Mother     Bone cancer Father 64    Cervical cancer Sister 52    Aneurysm Family     Heart attack Family         myocardial infarction    Rheum arthritis Family     Lung cancer Sister 70      Social History:     Social History     Socioeconomic History    Marital status:      Spouse name: None    Number of children: 4    Years of education: None    Highest education level: None   Occupational History    Occupation: Retired   Social Needs    Financial resource strain: None   Angelina-Luda insecurity:     Worry: None     Inability: None    Transportation needs:     Medical: None     Non-medical: None   Tobacco Use    Smoking status: Never Smoker    Smokeless tobacco: Never Used   Substance and Sexual Activity    Alcohol use: No     Comment: social alcohol use, per [employer]    Drug use: No    Sexual activity: None   Lifestyle    Physical activity:     Days per week: None     Minutes per session: None    Stress: None   Relationships    Social connections:     Talks on phone: None     Gets together: None     Attends Hinduism service: None     Active member of club or organization: None     Attends meetings of clubs or organizations: None     Relationship status: None    Intimate partner violence:     Fear of current or ex partner: None     Emotionally abused: None     Physically abused: None     Forced sexual activity: None   Other Topics Concern    None   Social History Narrative    Caffeine use, active    Completed 8th grade    No caffeine use, active, per [employer]       Medications and Allergies:     Current Outpatient Medications   Medication Sig Dispense Refill    acetaminophen (TYLENOL ARTHRITIS PAIN) 650 mg CR tablet Take by mouth      aspirin 81 MG tablet Take 81 mg by mouth daily      calcium citrate-vitamin D (CITRACAL+D) 315-200 MG-UNIT per tablet Take 1 tablet by mouth 2 (two) times a day      Cholecalciferol (VITAMIN D3) 1000 units CAPS Take 1 tablet by mouth daily      famotidine (PEPCID) 20 mg tablet TAKE 1 TABLET BY MOUTH EVERY DAY 30 tablet 5    Multiple Vitamins-Minerals (CENTRUM ADULTS PO) Take 1 tablet by mouth daily      niacin (NIASPAN) 1000 MG CR tablet TAKE 1 TABLET BY MOUTH EVERY NIGHT AT BEDTIME 30 tablet 6    pantoprazole (PROTONIX) 40 mg tablet TAKE 1 TABLET BY MOUTH EVERY DAY 30 tablet 5    simvastatin (ZOCOR) 40 mg tablet TAKE 1 TABLET BY MOUTH DAILY 30 tablet 5    nitroglycerin (NITROSTAT) 0 4 mg SL tablet Place 1 tablet (0 4 mg total) under the tongue every 5 (five) minutes as needed for chest pain Up to 3 doses  Call 911 if pain persists 25 tablet 0     No current facility-administered medications for this visit  Allergies   Allergen Reactions    Cephalexin Hives    Ciprofloxacin Hives    Cyclophosphamide Hives    Dexamethasone Hives    Erythromycin Hives    Flu Virus Vaccine Hives    Iodinated Casein     Loratadine     Macrobid  [Nitrofurantoin Monohyd Macro] Hives    Motrin [Ibuprofen] Itching    Mucinex Chest Congestion Child [Guaifenesin] Itching    Nitrofurantoin     Omeprazole     Oxycodone-Acetaminophen Other (See Comments)    Penicillins     Percocet  [Oxycodone-Acetaminophen]     Pneumococcal Vac Polyvalent Hives    Povidone Iodine       Immunizations: There is no immunization history for the selected administration types on file for this patient  Health Maintenance: There are no preventive care reminders to display for this patient  There are no preventive care reminders to display for this patient  Medicare Health Risk Assessment:     /60   Pulse 70   Temp 98 4 °F (36 9 °C) (Oral)   Resp 16   Ht 5' 1" (1 549 m)   Wt 79 6 kg (175 lb 6 4 oz)   SpO2 98%   BMI 33 14 kg/m²      Tommie Bai is here for her Subsequent Wellness visit  Health Risk Assessment:   Patient rates overall health as very good  Patient feels that their physical health rating is same  Eyesight was rated as same  Hearing was rated as slightly worse  Patient feels that their emotional and mental health rating is slightly better  Pain experienced in the last 7 days has been a lot  Patient's pain rating has been 3/10  Patient states that she has experienced no weight loss or gain in last 6 months  Depression Screening:   PHQ-2 Score: 0      Fall Risk Screening:    In the past year, patient has experienced: no history of falling in past year      Urinary Incontinence Screening:   Patient has not leaked urine accidently in the last six months  Home Safety:  Patient has trouble with stairs inside or outside of their home  Patient has working smoke alarms and has working carbon monoxide detector  Home safety hazards include: none  Nutrition:   Current diet is Regular and Frequent junk food  Medications:   Patient is not currently taking any over-the-counter supplements  Patient is able to manage medications  Activities of Daily Living (ADLs)/Instrumental Activities of Daily Living (IADLs):   Walk and transfer into and out of bed and chair?: Yes  Dress and groom yourself?: Yes    Bathe or shower yourself?: Yes    Feed yourself? Yes  Do your laundry/housekeeping?: Yes  Manage your money, pay your bills and track your expenses?: Yes  Make your own meals?: No    Do your own shopping?: Yes    Previous Hospitalizations:   Any hospitalizations or ED visits within the last 12 months?: No      Advance Care Planning:   Living will: No    Durable POA for healthcare:  Yes    Advanced directive: No      Cognitive Screening:   Provider or family/friend/caregiver concerned regarding cognition?: No    PREVENTIVE SCREENINGS      Cardiovascular Screening:    General: Screening Not Indicated and History Lipid Disorder      Diabetes Screening:     General: Screening Current      Colorectal Cancer Screening:     General: Screening Not Indicated      Breast Cancer Screening:     General: Screening Current      Cervical Cancer Screening:    General: Screening Not Indicated      Osteoporosis Screening:    General: Screening Not Indicated and History Osteoporosis      Abdominal Aortic Aneurysm (AAA) Screening:        General: Screening Not Indicated      Lung Cancer Screening:     General: Screening Not Indicated      Hepatitis C Screening:    General: Screening Not Indicated      Kaia Damon MD

## 2019-10-16 NOTE — TELEPHONE ENCOUNTER
The patient would like to know which orthopedic doctor you recommended  She thinks you said Dr Aleksandr Chong  Please advise  Thank you  This was sent in error

## 2019-10-16 NOTE — PATIENT INSTRUCTIONS
Medicare Preventive Visit Patient Instructions  Thank you for completing your Welcome to Medicare Visit or Medicare Annual Wellness Visit today  Your next wellness visit will be due in one year (10/16/2020)  The screening/preventive services that you may require over the next 5-10 years are detailed below  Some tests may not apply to you based off risk factors and/or age  Screening tests ordered at today's visit but not completed yet may show as past due  Also, please note that scanned in results may not display below  Preventive Screenings:  Service Recommendations Previous Testing/Comments   Colorectal Cancer Screening  * Colonoscopy    * Fecal Occult Blood Test (FOBT)/Fecal Immunochemical Test (FIT)  * Fecal DNA/Cologuard Test  * Flexible Sigmoidoscopy Age: 54-65 years old   Colonoscopy: every 10 years (may be performed more frequently if at higher risk)  OR  FOBT/FIT: every 1 year  OR  Cologuard: every 3 years  OR  Sigmoidoscopy: every 5 years  Screening may be recommended earlier than age 48 if at higher risk for colorectal cancer  Also, an individualized decision between you and your healthcare provider will decide whether screening between the ages of 74-80 would be appropriate  Colonoscopy: Not on file  FOBT/FIT: Not on file  Cologuard: Not on file  Sigmoidoscopy: Not on file         Breast Cancer Screening Age: 36 years old  Frequency: every 1-2 years  Not required if history of left and right mastectomy Mammogram: 05/08/2019    Screening Current   Cervical Cancer Screening Between the ages of 21-29, pap smear recommended once every 3 years  Between the ages of 33-67, can perform pap smear with HPV co-testing every 5 years     Recommendations may differ for women with a history of total hysterectomy, cervical cancer, or abnormal pap smears in past  Pap Smear: Not on file    Screening Not Indicated   Hepatitis C Screening Once for adults born between 1945 and 1965  More frequently in patients at high risk for Hepatitis C Hep C Antibody: Not on file       Diabetes Screening 1-2 times per year if you're at risk for diabetes or have pre-diabetes Fasting glucose: 103 mg/dL   A1C: No results in last 5 years    Screening Current   Cholesterol Screening Once every 5 years if you don't have a lipid disorder  May order more often based on risk factors  Lipid panel: 09/19/2019    Screening Not Indicated  History Lipid Disorder     Other Preventive Screenings Covered by Medicare:  1  Abdominal Aortic Aneurysm (AAA) Screening: covered once if your at risk  You're considered to be at risk if you have a family history of AAA  2  Lung Cancer Screening: covers low dose CT scan once per year if you meet all of the following conditions: (1) Age 50-69; (2) No signs or symptoms of lung cancer; (3) Current smoker or have quit smoking within the last 15 years; (4) You have a tobacco smoking history of at least 30 pack years (packs per day multiplied by number of years you smoked); (5) You get a written order from a healthcare provider  3  Glaucoma Screening: covered annually if you're considered high risk: (1) You have diabetes OR (2) Family history of glaucoma OR (3)  aged 48 and older OR (3)  American aged 72 and older  3  Osteoporosis Screening: covered every 2 years if you meet one of the following conditions: (1) You're estrogen deficient and at risk for osteoporosis based off medical history and other findings; (2) Have a vertebral abnormality; (3) On glucocorticoid therapy for more than 3 months; (4) Have primary hyperparathyroidism; (5) On osteoporosis medications and need to assess response to drug therapy  · Last bone density test (DXA Scan): 04/08/2016   5  HIV Screening: covered annually if you're between the age of 15-65  Also covered annually if you are younger than 13 and older than 72 with risk factors for HIV infection   For pregnant patients, it is covered up to 3 times per pregnancy  Immunizations:  Immunization Recommendations   Influenza Vaccine Annual influenza vaccination during flu season is recommended for all persons aged >= 6 months who do not have contraindications   Pneumococcal Vaccine (Prevnar and Pneumovax)  * Prevnar = PCV13  * Pneumovax = PPSV23   Adults 25-60 years old: 1-3 doses may be recommended based on certain risk factors  Adults 72 years old: Prevnar (PCV13) vaccine recommended followed by Pneumovax (PPSV23) vaccine  If already received PPSV23 since turning 65, then PCV13 recommended at least one year after PPSV23 dose  Hepatitis B Vaccine 3 dose series if at intermediate or high risk (ex: diabetes, end stage renal disease, liver disease)   Tetanus (Td) Vaccine - COST NOT COVERED BY MEDICARE PART B Following completion of primary series, a booster dose should be given every 10 years to maintain immunity against tetanus  Td may also be given as tetanus wound prophylaxis  Tdap Vaccine - COST NOT COVERED BY MEDICARE PART B Recommended at least once for all adults  For pregnant patients, recommended with each pregnancy  Shingles Vaccine (Shingrix) - COST NOT COVERED BY MEDICARE PART B  2 shot series recommended in those aged 48 and above     Health Maintenance Due:  There are no preventive care reminders to display for this patient  Immunizations Due:  There are no preventive care reminders to display for this patient  Advance Directives   What are advance directives? Advance directives are legal documents that state your wishes and plans for medical care  These plans are made ahead of time in case you lose your ability to make decisions for yourself  Advance directives can apply to any medical decision, such as the treatments you want, and if you want to donate organs  What are the types of advance directives? There are many types of advance directives, and each state has rules about how to use them   You may choose a combination of any of the following:  · Living will: This is a written record of the treatment you want  You can also choose which treatments you do not want, which to limit, and which to stop at a certain time  This includes surgery, medicine, IV fluid, and tube feedings  · Durable power of  for healthcare [facility]): This is a written record that states who you want to make healthcare choices for you when you are unable to make them for yourself  This person, called a proxy, is usually a family member or a friend  You may choose more than 1 proxy  · Do not resuscitate (DNR) order:  A DNR order is used in case your heart stops beating or you stop breathing  It is a request not to have certain forms of treatment, such as CPR  A DNR order may be included in other types of advance directives  · Medical directive: This covers the care that you want if you are in a coma, near death, or unable to make decisions for yourself  You can list the treatments you want for each condition  Treatment may include pain medicine, surgery, blood transfusions, dialysis, IV or tube feedings, and a ventilator (breathing machine)  · Values history: This document has questions about your views, beliefs, and how you feel and think about life  This information can help others choose the care that you would choose  Why are advance directives important? An advance directive helps you control your care  Although spoken wishes may be used, it is better to have your wishes written down  Spoken wishes can be misunderstood, or not followed  Treatments may be given even if you do not want them  An advance directive may make it easier for your family to make difficult choices about your care  Urinary Incontinence   Urinary incontinence (UI)  is when you lose control of your bladder  UI develops because your bladder cannot store or empty urine properly  The 3 most common types of UI are stress incontinence, urge incontinence, or both    Medicines:   · May be given to help strengthen your bladder control  Report any side effects of medication to your healthcare provider  Do pelvic muscle exercises often:  Your pelvic muscles help you stop urinating  Squeeze these muscles tight for 5 seconds, then relax for 5 seconds  Gradually work up to squeezing for 10 seconds  Do 3 sets of 15 repetitions a day, or as directed  This will help strengthen your pelvic muscles and improve bladder control  Train your bladder:  Go to the bathroom at set times, such as every 2 hours, even if you do not feel the urge to go  You can also try to hold your urine when you feel the urge to go  For example, hold your urine for 5 minutes when you feel the urge to go  As that becomes easier, hold your urine for 10 minutes  Self-care:   · Keep a UI record  Write down how often you leak urine and how much you leak  Make a note of what you were doing when you leaked urine  · Drink liquids as directed  You may need to limit the amount of liquid you drink to help control your urine leakage  Do not drink any liquid right before you go to bed  Limit or do not have drinks that contain caffeine or alcohol  · Prevent constipation  Eat a variety of high-fiber foods  Good examples are high-fiber cereals, beans, vegetables, and whole-grain breads  Walking is the best way to trigger your intestines to have a bowel movement  · Exercise regularly and maintain a healthy weight  Weight loss and exercise will decrease pressure on your bladder and help you control your leakage  · Use a catheter as directed  to help empty your bladder  A catheter is a tiny, plastic tube that is put into your bladder to drain your urine  · Go to behavior therapy as directed  Behavior therapy may be used to help you learn to control your urge to urinate      Weight Management   Why it is important to manage your weight:  Being overweight increases your risk of health conditions such as heart disease, high blood pressure, type 2 diabetes, and certain types of cancer  It can also increase your risk for osteoarthritis, sleep apnea, and other respiratory problems  Aim for a slow, steady weight loss  Even a small amount of weight loss can lower your risk of health problems  How to lose weight safely:  A safe and healthy way to lose weight is to eat fewer calories and get regular exercise  You can lose up about 1 pound a week by decreasing the number of calories you eat by 500 calories each day  Healthy meal plan for weight management:  A healthy meal plan includes a variety of foods, contains fewer calories, and helps you stay healthy  A healthy meal plan includes the following:  · Eat whole-grain foods more often  A healthy meal plan should contain fiber  Fiber is the part of grains, fruits, and vegetables that is not broken down by your body  Whole-grain foods are healthy and provide extra fiber in your diet  Some examples of whole-grain foods are whole-wheat breads and pastas, oatmeal, brown rice, and bulgur  · Eat a variety of vegetables every day  Include dark, leafy greens such as spinach, kale, ernesto greens, and mustard greens  Eat yellow and orange vegetables such as carrots, sweet potatoes, and winter squash  · Eat a variety of fruits every day  Choose fresh or canned fruit (canned in its own juice or light syrup) instead of juice  Fruit juice has very little or no fiber  · Eat low-fat dairy foods  Drink fat-free (skim) milk or 1% milk  Eat fat-free yogurt and low-fat cottage cheese  Try low-fat cheeses such as mozzarella and other reduced-fat cheeses  · Choose meat and other protein foods that are low in fat  Choose beans or other legumes such as split peas or lentils  Choose fish, skinless poultry (chicken or turkey), or lean cuts of red meat (beef or pork)  Before you cook meat or poultry, cut off any visible fat  · Use less fat and oil  Try baking foods instead of frying them   Add less fat, such as margarine, sour cream, regular salad dressing and mayonnaise to foods  Eat fewer high-fat foods  Some examples of high-fat foods include french fries, doughnuts, ice cream, and cakes  · Eat fewer sweets  Limit foods and drinks that are high in sugar  This includes candy, cookies, regular soda, and sweetened drinks  Exercise:  Exercise at least 30 minutes per day on most days of the week  Some examples of exercise include walking, biking, dancing, and swimming  You can also fit in more physical activity by taking the stairs instead of the elevator or parking farther away from stores  Ask your healthcare provider about the best exercise plan for you  © Copyright [employer] 2018 Information is for End User's use only and may not be sold, redistributed or otherwise used for commercial purposes   All illustrations and images included in CareNotes® are the copyrighted property of A D A M , [employer]  or [address] [place] [address]

## 2019-10-17 PROBLEM — J06.9 UPPER RESPIRATORY TRACT INFECTION: Status: RESOLVED | Noted: 2019-03-20 | Resolved: 2019-10-17

## 2019-10-17 NOTE — ASSESSMENT & PLAN NOTE
BMI Counseling: Body mass index is 33 14 kg/m²  The BMI is above normal  Nutrition recommendations include moderation in carbohydrate intake  BMI Counseling: Body mass index is 33 14 kg/m²  The BMI is above normal  Exercise recommendations include exercising 3-5 times per week

## 2019-10-29 ENCOUNTER — CONSULT (OUTPATIENT)
Dept: OBGYN CLINIC | Facility: OTHER | Age: 84
End: 2019-10-29
Payer: COMMERCIAL

## 2019-10-29 ENCOUNTER — APPOINTMENT (OUTPATIENT)
Dept: RADIOLOGY | Facility: OTHER | Age: 84
End: 2019-10-29
Payer: COMMERCIAL

## 2019-10-29 VITALS
SYSTOLIC BLOOD PRESSURE: 140 MMHG | WEIGHT: 174 LBS | HEART RATE: 80 BPM | HEIGHT: 61 IN | DIASTOLIC BLOOD PRESSURE: 80 MMHG | BODY MASS INDEX: 32.85 KG/M2

## 2019-10-29 DIAGNOSIS — M25.511 RIGHT SHOULDER PAIN, UNSPECIFIED CHRONICITY: ICD-10-CM

## 2019-10-29 DIAGNOSIS — G89.29 CHRONIC RIGHT SHOULDER PAIN: ICD-10-CM

## 2019-10-29 DIAGNOSIS — M19.011 PRIMARY OSTEOARTHRITIS OF RIGHT SHOULDER: Primary | ICD-10-CM

## 2019-10-29 DIAGNOSIS — M25.511 CHRONIC RIGHT SHOULDER PAIN: ICD-10-CM

## 2019-10-29 PROCEDURE — 20610 DRAIN/INJ JOINT/BURSA W/O US: CPT | Performed by: ORTHOPAEDIC SURGERY

## 2019-10-29 PROCEDURE — 73030 X-RAY EXAM OF SHOULDER: CPT

## 2019-10-29 PROCEDURE — 99203 OFFICE O/P NEW LOW 30 MIN: CPT | Performed by: ORTHOPAEDIC SURGERY

## 2019-10-29 RX ORDER — BUPIVACAINE HYDROCHLORIDE 2.5 MG/ML
2 INJECTION, SOLUTION INFILTRATION; PERINEURAL
Status: COMPLETED | OUTPATIENT
Start: 2019-10-29 | End: 2019-10-29

## 2019-10-29 RX ORDER — BETAMETHASONE SODIUM PHOSPHATE AND BETAMETHASONE ACETATE 3; 3 MG/ML; MG/ML
6 INJECTION, SUSPENSION INTRA-ARTICULAR; INTRALESIONAL; INTRAMUSCULAR; SOFT TISSUE
Status: COMPLETED | OUTPATIENT
Start: 2019-10-29 | End: 2019-10-29

## 2019-10-29 RX ADMIN — BUPIVACAINE HYDROCHLORIDE 2 ML: 2.5 INJECTION, SOLUTION INFILTRATION; PERINEURAL at 13:16

## 2019-10-29 RX ADMIN — BETAMETHASONE SODIUM PHOSPHATE AND BETAMETHASONE ACETATE 6 MG: 3; 3 INJECTION, SUSPENSION INTRA-ARTICULAR; INTRALESIONAL; INTRAMUSCULAR; SOFT TISSUE at 13:16

## 2019-10-29 NOTE — PROGRESS NOTES
Assessment  Diagnoses and all orders for this visit:    Primary osteoarthritis of right shoulder    Chronic rotator cuff tear        Discussion and Plan:    The patient has an examination consistent with right shoulder OA with likely underlying rotator cuff pathology  I have discussed with the patient the pathophysiology of this diagnosis and reviewed how the examination correlates with this diagnosis  Treatment options were discussed at length and after discussing these treatment options, the patient elected for a GH joint injection of corticosteroid  Discussed the possibility of a cross over reaction due to her dexamethasone allergy and if that happen she can take Benadryl OTC  If symptoms do not improve with this treatment she would be a candidate for reverse total shoulder arthroplasty, she is status post arthroplasty of her left shoulder is very happy with the results of that  Subjective:   Patient ID: Cher Mullen is a 80 y o  female      HPI    The patient presents with a chief complaint of right shoulder pain  The pain began 4 year(s) ago and is not associated with an acute injury  Patient was seen by Dr Marycarmen Paul at [place] [address]  who recommended a RTC repair however she was not cleared by cardiology  The patient describes the pain as aching, dull and sharp in intensity,  intermittent in timing, and localizes the pain to the  right globally  The pain is worse with movement and relieved by rest   The pain is not associated with numbness and tingling  The pain is not associated with constitutional symptoms  The patient is awoken at night by the pain  The patient was seen and evaluated by Suresh Posadas MD on 10/16/19 and referred here today for orthopedic consultation         The following portions of the patient's history were reviewed and updated as appropriate: allergies, current medications, past family history, past medical history, past social history, past surgical history and problem list     Review of Systems   Constitutional: Negative for chills and fever  HENT: Negative for drooling and sneezing  Eyes: Negative for redness  Respiratory: Negative for cough and wheezing  Gastrointestinal: Negative for nausea and vomiting  Musculoskeletal:        Please see ortho exam   Psychiatric/Behavioral: Negative for behavioral problems  The patient is not nervous/anxious  Objective:  Ht 5' 1" (1 549 m)   Wt 78 9 kg (174 lb) Comment: verbal  BMI 32 88 kg/m²       Right Shoulder Exam     Range of Motion   External rotation: 40   Forward flexion: 90 (130 PROM)   Internal rotation 0 degrees: Sacrum     Muscle Strength   Abduction: 3/5   External rotation: 3/5     Other   Erythema: absent  Sensation: normal  Pulse: present            Physical Exam   Constitutional: She is oriented to person, place, and time  She appears well-developed and well-nourished  Eyes: Pupils are equal, round, and reactive to light  Pulmonary/Chest: Effort normal and breath sounds normal    Neurological: She is alert and oriented to person, place, and time  Skin: Skin is warm and dry  Psychiatric: She has a normal mood and affect  Her behavior is normal  Judgment and thought content normal    Vitals reviewed        Large joint arthrocentesis: R glenohumeral  Date/Time: 10/29/2019 1:16 PM  Consent given by: parent  Site marked: site marked  Timeout: Immediately prior to procedure a time out was called to verify the correct patient, procedure, equipment, support staff and site/side marked as required   Supporting Documentation  Indications: pain   Procedure Details  Location: shoulder - R glenohumeral  Preparation: Patient was prepped and draped in the usual sterile fashion  Needle size: 22 G  Ultrasound guidance: no  Approach: lateral  Medications administered: 2 mL bupivacaine 0 25 %; 6 mg betamethasone acetate-betamethasone sodium phosphate 6 (3-3) mg/mL    Patient tolerance: patient tolerated the procedure well with no immediate complications  Dressing:  Sterile dressing applied        I have personally reviewed pertinent films in PACS and my interpretation is as follows  Right shoulder x-rays demonstrate no acute fracture or dislocation with moderate degenerative changes       Scribe Attestation    I,:   Lucy Dailey am acting as a scribe while in the presence of the attending physician :        I,:   Carmen Parmar MD personally performed the services described in this documentation    as scribed in my presence :

## 2019-10-29 NOTE — PATIENT INSTRUCTIONS

## 2019-11-05 ENCOUNTER — OFFICE VISIT (OUTPATIENT)
Dept: CARDIOLOGY CLINIC | Facility: CLINIC | Age: 84
End: 2019-11-05
Payer: COMMERCIAL

## 2019-11-05 VITALS
SYSTOLIC BLOOD PRESSURE: 132 MMHG | BODY MASS INDEX: 32.66 KG/M2 | HEIGHT: 61 IN | HEART RATE: 64 BPM | WEIGHT: 173 LBS | DIASTOLIC BLOOD PRESSURE: 84 MMHG

## 2019-11-05 DIAGNOSIS — Z01.810 PREOP CARDIOVASCULAR EXAM: ICD-10-CM

## 2019-11-05 DIAGNOSIS — I25.10 CAD IN NATIVE ARTERY: Primary | ICD-10-CM

## 2019-11-05 DIAGNOSIS — E78.2 MIXED HYPERLIPIDEMIA: ICD-10-CM

## 2019-11-05 DIAGNOSIS — I10 HYPERTENSION, ESSENTIAL, BENIGN: ICD-10-CM

## 2019-11-05 PROCEDURE — 3075F SYST BP GE 130 - 139MM HG: CPT | Performed by: INTERNAL MEDICINE

## 2019-11-05 PROCEDURE — 99214 OFFICE O/P EST MOD 30 MIN: CPT | Performed by: INTERNAL MEDICINE

## 2019-11-05 PROCEDURE — 3079F DIAST BP 80-89 MM HG: CPT | Performed by: INTERNAL MEDICINE

## 2019-11-25 DIAGNOSIS — E78.2 MIXED HYPERLIPIDEMIA: ICD-10-CM

## 2019-11-25 DIAGNOSIS — K21.9 GASTROESOPHAGEAL REFLUX DISEASE, ESOPHAGITIS PRESENCE NOT SPECIFIED: ICD-10-CM

## 2019-11-25 RX ORDER — PANTOPRAZOLE SODIUM 40 MG/1
TABLET, DELAYED RELEASE ORAL
Qty: 30 TABLET | Refills: 5 | Status: SHIPPED | OUTPATIENT
Start: 2019-11-25 | End: 2020-05-27

## 2019-11-25 RX ORDER — SIMVASTATIN 40 MG
TABLET ORAL
Qty: 30 TABLET | Refills: 5 | Status: SHIPPED | OUTPATIENT
Start: 2019-11-25 | End: 2020-05-27

## 2019-11-25 RX ORDER — NIACIN 1000 MG/1
1000 TABLET, EXTENDED RELEASE ORAL
Qty: 30 TABLET | Refills: 5 | Status: SHIPPED | OUTPATIENT
Start: 2019-11-25 | End: 2020-05-27

## 2020-01-13 ENCOUNTER — TELEPHONE (OUTPATIENT)
Dept: INTERNAL MEDICINE CLINIC | Facility: CLINIC | Age: 85
End: 2020-01-13

## 2020-01-13 DIAGNOSIS — R39.9 UTI SYMPTOMS: Primary | ICD-10-CM

## 2020-01-13 NOTE — TELEPHONE ENCOUNTER
Let us get a urinalysis with culture ASAP  Order entered  After she submits the sample, she can try OTC Azo

## 2020-01-13 NOTE — TELEPHONE ENCOUNTER
Patient called back and said she is unsure of what medication she can tolerate  She can't remember       Please advise

## 2020-01-13 NOTE — TELEPHONE ENCOUNTER
What antibiotic has she tolerated before for a UTI? She is allergic to all the ones I prescribe for UTIs

## 2020-01-13 NOTE — TELEPHONE ENCOUNTER
Patient said she is allergic to Bactrim  She has taken this medication in the past and had a reaction to it

## 2020-01-13 NOTE — TELEPHONE ENCOUNTER
Patient started with symptoms of burning and urgency when urinating  The symptoms started over the weekend  Patient tried drinking cranberry juice but it is not helping  Asking if you can send something to the pharmacy    [id number] [address]    Please advise

## 2020-01-13 NOTE — TELEPHONE ENCOUNTER
She has multiple antibiotic allergies  Please confirm with her if she is allergic to Bactrim/sulfa  It is not on her list but is a common drug allergy

## 2020-01-14 ENCOUNTER — APPOINTMENT (OUTPATIENT)
Dept: LAB | Facility: CLINIC | Age: 85
End: 2020-01-14
Payer: COMMERCIAL

## 2020-01-14 ENCOUNTER — TRANSCRIBE ORDERS (OUTPATIENT)
Dept: LAB | Facility: CLINIC | Age: 85
End: 2020-01-14

## 2020-01-14 LAB
BACTERIA UR QL AUTO: ABNORMAL /HPF
BILIRUB UR QL STRIP: NEGATIVE
CLARITY UR: CLEAR
COLOR UR: YELLOW
GLUCOSE UR STRIP-MCNC: NEGATIVE MG/DL
HGB UR QL STRIP.AUTO: ABNORMAL
KETONES UR STRIP-MCNC: NEGATIVE MG/DL
LEUKOCYTE ESTERASE UR QL STRIP: ABNORMAL
NITRITE UR QL STRIP: NEGATIVE
NON-SQ EPI CELLS URNS QL MICRO: ABNORMAL /HPF
PH UR STRIP.AUTO: 6 [PH]
PROT UR STRIP-MCNC: NEGATIVE MG/DL
RBC #/AREA URNS AUTO: ABNORMAL /HPF
SP GR UR STRIP.AUTO: 1.01 (ref 1–1.03)
UROBILINOGEN UR QL STRIP.AUTO: 0.2 E.U./DL
WBC #/AREA URNS AUTO: ABNORMAL /HPF

## 2020-01-14 PROCEDURE — 87186 SC STD MICRODIL/AGAR DIL: CPT | Performed by: INTERNAL MEDICINE

## 2020-01-14 PROCEDURE — 87077 CULTURE AEROBIC IDENTIFY: CPT | Performed by: INTERNAL MEDICINE

## 2020-01-14 PROCEDURE — 81001 URINALYSIS AUTO W/SCOPE: CPT | Performed by: INTERNAL MEDICINE

## 2020-01-14 PROCEDURE — 87086 URINE CULTURE/COLONY COUNT: CPT | Performed by: INTERNAL MEDICINE

## 2020-01-16 DIAGNOSIS — N30.00 ACUTE CYSTITIS WITHOUT HEMATURIA: Primary | ICD-10-CM

## 2020-01-16 LAB — BACTERIA UR CULT: ABNORMAL

## 2020-01-16 RX ORDER — CEFPODOXIME PROXETIL 100 MG/1
100 TABLET, FILM COATED ORAL 2 TIMES DAILY
Qty: 6 TABLET | Refills: 0 | Status: SHIPPED | OUTPATIENT
Start: 2020-01-16 | End: 2020-01-19

## 2020-01-16 RX ORDER — CEFPODOXIME PROXETIL 100 MG/1
100 TABLET, FILM COATED ORAL 2 TIMES DAILY
Qty: 6 TABLET | Refills: 0 | Status: SHIPPED | OUTPATIENT
Start: 2020-01-16 | End: 2020-01-16 | Stop reason: SDUPTHER

## 2020-01-24 DIAGNOSIS — K21.9 GASTROESOPHAGEAL REFLUX DISEASE, ESOPHAGITIS PRESENCE NOT SPECIFIED: ICD-10-CM

## 2020-01-24 RX ORDER — FAMOTIDINE 20 MG/1
TABLET, FILM COATED ORAL
Qty: 30 TABLET | Refills: 5 | Status: SHIPPED | OUTPATIENT
Start: 2020-01-24 | End: 2020-07-25

## 2020-04-01 ENCOUNTER — TELEPHONE (OUTPATIENT)
Dept: INTERNAL MEDICINE CLINIC | Facility: CLINIC | Age: 85
End: 2020-04-01

## 2020-04-14 ENCOUNTER — TELEMEDICINE (OUTPATIENT)
Dept: INTERNAL MEDICINE CLINIC | Facility: CLINIC | Age: 85
End: 2020-04-14
Payer: COMMERCIAL

## 2020-04-14 VITALS
WEIGHT: 178 LBS | DIASTOLIC BLOOD PRESSURE: 84 MMHG | BODY MASS INDEX: 33.61 KG/M2 | HEIGHT: 61 IN | SYSTOLIC BLOOD PRESSURE: 144 MMHG | HEART RATE: 74 BPM

## 2020-04-14 DIAGNOSIS — I25.10 CAD IN NATIVE ARTERY: ICD-10-CM

## 2020-04-14 DIAGNOSIS — Z88.9 MULTIPLE ALLERGIES: ICD-10-CM

## 2020-04-14 DIAGNOSIS — E78.2 MIXED HYPERLIPIDEMIA: ICD-10-CM

## 2020-04-14 DIAGNOSIS — L60.0 INGROWING TOENAIL WITH INFECTION: ICD-10-CM

## 2020-04-14 DIAGNOSIS — K21.9 GASTROESOPHAGEAL REFLUX DISEASE, ESOPHAGITIS PRESENCE NOT SPECIFIED: Primary | ICD-10-CM

## 2020-04-14 PROBLEM — Z01.810 PREOP CARDIOVASCULAR EXAM: Status: RESOLVED | Noted: 2019-11-05 | Resolved: 2020-04-14

## 2020-04-14 PROCEDURE — 99214 OFFICE O/P EST MOD 30 MIN: CPT | Performed by: INTERNAL MEDICINE

## 2020-04-14 PROCEDURE — 1160F RVW MEDS BY RX/DR IN RCRD: CPT | Performed by: INTERNAL MEDICINE

## 2020-04-14 RX ORDER — MULTIVIT WITH MINERALS/LUTEIN
1000 TABLET ORAL DAILY
COMMUNITY

## 2020-05-26 DIAGNOSIS — K21.9 GASTROESOPHAGEAL REFLUX DISEASE, ESOPHAGITIS PRESENCE NOT SPECIFIED: ICD-10-CM

## 2020-05-26 DIAGNOSIS — E78.2 MIXED HYPERLIPIDEMIA: ICD-10-CM

## 2020-05-27 RX ORDER — PANTOPRAZOLE SODIUM 40 MG/1
TABLET, DELAYED RELEASE ORAL
Qty: 30 TABLET | Refills: 5 | Status: SHIPPED | OUTPATIENT
Start: 2020-05-27 | End: 2020-08-13 | Stop reason: SDUPTHER

## 2020-05-27 RX ORDER — SIMVASTATIN 40 MG
TABLET ORAL
Qty: 30 TABLET | Refills: 5 | Status: SHIPPED | OUTPATIENT
Start: 2020-05-27 | End: 2020-08-13 | Stop reason: SDUPTHER

## 2020-05-27 RX ORDER — NIACIN 1000 MG/1
TABLET, EXTENDED RELEASE ORAL
Qty: 30 TABLET | Refills: 5 | Status: SHIPPED | OUTPATIENT
Start: 2020-05-27 | End: 2020-08-13 | Stop reason: SDUPTHER

## 2020-06-01 ENCOUNTER — TELEMEDICINE (OUTPATIENT)
Dept: INTERNAL MEDICINE CLINIC | Facility: CLINIC | Age: 85
End: 2020-06-01
Payer: COMMERCIAL

## 2020-06-01 VITALS
WEIGHT: 173 LBS | SYSTOLIC BLOOD PRESSURE: 132 MMHG | TEMPERATURE: 99 F | RESPIRATION RATE: 18 BRPM | OXYGEN SATURATION: 98 % | BODY MASS INDEX: 32.66 KG/M2 | HEART RATE: 77 BPM | DIASTOLIC BLOOD PRESSURE: 72 MMHG | HEIGHT: 61 IN

## 2020-06-01 DIAGNOSIS — R60.0 LOCALIZED EDEMA: ICD-10-CM

## 2020-06-01 DIAGNOSIS — R21 RASH: Primary | ICD-10-CM

## 2020-06-01 DIAGNOSIS — G89.29 CHRONIC MIDLINE LOW BACK PAIN WITHOUT SCIATICA: ICD-10-CM

## 2020-06-01 DIAGNOSIS — M54.50 CHRONIC MIDLINE LOW BACK PAIN WITHOUT SCIATICA: ICD-10-CM

## 2020-06-01 PROCEDURE — 3078F DIAST BP <80 MM HG: CPT | Performed by: NURSE PRACTITIONER

## 2020-06-01 PROCEDURE — 1036F TOBACCO NON-USER: CPT | Performed by: NURSE PRACTITIONER

## 2020-06-01 PROCEDURE — 3075F SYST BP GE 130 - 139MM HG: CPT | Performed by: NURSE PRACTITIONER

## 2020-06-01 PROCEDURE — 1160F RVW MEDS BY RX/DR IN RCRD: CPT | Performed by: NURSE PRACTITIONER

## 2020-06-01 PROCEDURE — 99213 OFFICE O/P EST LOW 20 MIN: CPT | Performed by: NURSE PRACTITIONER

## 2020-06-01 RX ORDER — TRIAMCINOLONE ACETONIDE 1 MG/G
CREAM TOPICAL 2 TIMES DAILY
Qty: 30 G | Refills: 0 | Status: SHIPPED | OUTPATIENT
Start: 2020-06-01 | End: 2020-08-11 | Stop reason: ALTCHOICE

## 2020-06-02 ENCOUNTER — APPOINTMENT (OUTPATIENT)
Dept: LAB | Facility: CLINIC | Age: 85
End: 2020-06-02
Payer: COMMERCIAL

## 2020-06-02 DIAGNOSIS — I10 HYPERTENSION, ESSENTIAL, BENIGN: ICD-10-CM

## 2020-06-02 DIAGNOSIS — E78.2 MIXED HYPERLIPIDEMIA: ICD-10-CM

## 2020-06-02 DIAGNOSIS — I25.10 CORONARY ARTERY DISEASE INVOLVING NATIVE CORONARY ARTERY OF NATIVE HEART, ANGINA PRESENCE UNSPECIFIED: ICD-10-CM

## 2020-06-02 LAB
ALBUMIN SERPL BCP-MCNC: 3.4 G/DL (ref 3.5–5)
ALP SERPL-CCNC: 93 U/L (ref 46–116)
ALT SERPL W P-5'-P-CCNC: 19 U/L (ref 12–78)
ANION GAP SERPL CALCULATED.3IONS-SCNC: 9 MMOL/L (ref 4–13)
AST SERPL W P-5'-P-CCNC: 29 U/L (ref 5–45)
BILIRUB SERPL-MCNC: 0.65 MG/DL (ref 0.2–1)
BUN SERPL-MCNC: 15 MG/DL (ref 5–25)
CALCIUM SERPL-MCNC: 8.9 MG/DL (ref 8.3–10.1)
CHLORIDE SERPL-SCNC: 105 MMOL/L (ref 100–108)
CHOLEST SERPL-MCNC: 129 MG/DL (ref 50–200)
CO2 SERPL-SCNC: 28 MMOL/L (ref 21–32)
CREAT SERPL-MCNC: 0.91 MG/DL (ref 0.6–1.3)
ERYTHROCYTE [DISTWIDTH] IN BLOOD BY AUTOMATED COUNT: 13.3 % (ref 11.6–15.1)
GFR SERPL CREATININE-BSD FRML MDRD: 58 ML/MIN/1.73SQ M
GLUCOSE P FAST SERPL-MCNC: 94 MG/DL (ref 65–99)
HCT VFR BLD AUTO: 38.2 % (ref 34.8–46.1)
HDLC SERPL-MCNC: 60 MG/DL
HGB BLD-MCNC: 12.2 G/DL (ref 11.5–15.4)
LDLC SERPL CALC-MCNC: 53 MG/DL (ref 0–100)
MCH RBC QN AUTO: 33 PG (ref 26.8–34.3)
MCHC RBC AUTO-ENTMCNC: 31.9 G/DL (ref 31.4–37.4)
MCV RBC AUTO: 103 FL (ref 82–98)
NONHDLC SERPL-MCNC: 69 MG/DL
PLATELET # BLD AUTO: 140 THOUSANDS/UL (ref 149–390)
PMV BLD AUTO: 9 FL (ref 8.9–12.7)
POTASSIUM SERPL-SCNC: 3.4 MMOL/L (ref 3.5–5.3)
PROT SERPL-MCNC: 6.4 G/DL (ref 6.4–8.2)
RBC # BLD AUTO: 3.7 MILLION/UL (ref 3.81–5.12)
SODIUM SERPL-SCNC: 142 MMOL/L (ref 136–145)
TRIGL SERPL-MCNC: 78 MG/DL
WBC # BLD AUTO: 5.57 THOUSAND/UL (ref 4.31–10.16)

## 2020-06-02 PROCEDURE — 80061 LIPID PANEL: CPT

## 2020-06-02 PROCEDURE — 36415 COLL VENOUS BLD VENIPUNCTURE: CPT

## 2020-06-02 PROCEDURE — 85027 COMPLETE CBC AUTOMATED: CPT

## 2020-06-02 PROCEDURE — 80053 COMPREHEN METABOLIC PANEL: CPT

## 2020-06-03 PROBLEM — R21 RASH: Status: ACTIVE | Noted: 2020-06-03

## 2020-06-03 PROBLEM — M54.50 CHRONIC MIDLINE LOW BACK PAIN WITHOUT SCIATICA: Status: ACTIVE | Noted: 2020-06-03

## 2020-06-03 PROBLEM — G89.29 CHRONIC MIDLINE LOW BACK PAIN WITHOUT SCIATICA: Status: ACTIVE | Noted: 2020-06-03

## 2020-06-03 PROBLEM — R60.0 LOCALIZED EDEMA: Status: ACTIVE | Noted: 2020-06-03

## 2020-07-25 DIAGNOSIS — K21.9 GASTROESOPHAGEAL REFLUX DISEASE, ESOPHAGITIS PRESENCE NOT SPECIFIED: ICD-10-CM

## 2020-07-25 RX ORDER — FAMOTIDINE 20 MG/1
TABLET, FILM COATED ORAL
Qty: 30 TABLET | Refills: 5 | Status: SHIPPED | OUTPATIENT
Start: 2020-07-25 | End: 2020-08-13 | Stop reason: SDUPTHER

## 2020-08-11 ENCOUNTER — OFFICE VISIT (OUTPATIENT)
Dept: INTERNAL MEDICINE CLINIC | Facility: CLINIC | Age: 85
End: 2020-08-11
Payer: COMMERCIAL

## 2020-08-11 VITALS
HEIGHT: 61 IN | WEIGHT: 178.2 LBS | DIASTOLIC BLOOD PRESSURE: 74 MMHG | SYSTOLIC BLOOD PRESSURE: 122 MMHG | HEART RATE: 76 BPM | TEMPERATURE: 98.4 F | BODY MASS INDEX: 33.64 KG/M2 | OXYGEN SATURATION: 97 %

## 2020-08-11 DIAGNOSIS — N39.3 STRESS INCONTINENCE OF URINE: ICD-10-CM

## 2020-08-11 DIAGNOSIS — I25.10 CAD IN NATIVE ARTERY: ICD-10-CM

## 2020-08-11 DIAGNOSIS — K21.9 GASTROESOPHAGEAL REFLUX DISEASE, ESOPHAGITIS PRESENCE NOT SPECIFIED: Primary | ICD-10-CM

## 2020-08-11 DIAGNOSIS — H61.21 IMPACTED CERUMEN OF RIGHT EAR: ICD-10-CM

## 2020-08-11 DIAGNOSIS — E78.2 MIXED HYPERLIPIDEMIA: ICD-10-CM

## 2020-08-11 PROBLEM — R21 RASH: Status: RESOLVED | Noted: 2020-06-03 | Resolved: 2020-08-11

## 2020-08-11 PROBLEM — I10 HYPERTENSION, ESSENTIAL, BENIGN: Status: RESOLVED | Noted: 2018-11-06 | Resolved: 2020-08-11

## 2020-08-11 PROCEDURE — 99214 OFFICE O/P EST MOD 30 MIN: CPT | Performed by: INTERNAL MEDICINE

## 2020-08-11 PROCEDURE — 1101F PT FALLS ASSESS-DOCD LE1/YR: CPT | Performed by: INTERNAL MEDICINE

## 2020-08-11 PROCEDURE — 1036F TOBACCO NON-USER: CPT | Performed by: INTERNAL MEDICINE

## 2020-08-11 PROCEDURE — 3725F SCREEN DEPRESSION PERFORMED: CPT | Performed by: INTERNAL MEDICINE

## 2020-08-11 PROCEDURE — 3078F DIAST BP <80 MM HG: CPT | Performed by: INTERNAL MEDICINE

## 2020-08-11 PROCEDURE — 1160F RVW MEDS BY RX/DR IN RCRD: CPT | Performed by: INTERNAL MEDICINE

## 2020-08-11 PROCEDURE — 3288F FALL RISK ASSESSMENT DOCD: CPT | Performed by: INTERNAL MEDICINE

## 2020-08-11 PROCEDURE — 3008F BODY MASS INDEX DOCD: CPT | Performed by: INTERNAL MEDICINE

## 2020-08-11 PROCEDURE — 3074F SYST BP LT 130 MM HG: CPT | Performed by: INTERNAL MEDICINE

## 2020-08-11 PROCEDURE — 69210 REMOVE IMPACTED EAR WAX UNI: CPT | Performed by: INTERNAL MEDICINE

## 2020-08-11 NOTE — PROGRESS NOTES
Assessment/Plan:  Continue current medications       Problem List Items Addressed This Visit        Digestive    Esophageal reflux - Primary       Cardiovascular and Mediastinum    CAD in native artery    Relevant Orders    CBC and Platelet    Comprehensive metabolic panel    Lipid Panel with Direct LDL reflex       Other    Hyperlipidemia    Relevant Orders    Comprehensive metabolic panel    Lipid Panel with Direct LDL reflex    Stress incontinence of urine     Kegels and bladder training discussed           Other Visit Diagnoses     Impacted cerumen of right ear        Relevant Orders    Ear cerumen removal (Completed)            Subjective:      Patient ID: Valentino Ivans is a 80 y o  female  HPI  Here for a follow up  Feeling well overall  Recent labs reviewed and unremarkable  Hyperlipidemia on simvastatin, controlled  GERD on pantoprazole and famotidine      The following portions of the patient's history were reviewed and updated as appropriate: allergies, current medications, past family history, past medical history, past social history, past surgical history and problem list     Review of Systems   Constitutional: Negative for chills, fever and unexpected weight change  HENT: Positive for congestion, ear pain (right ear), hearing loss (right), postnasal drip and sinus pressure  Negative for sore throat  Chronic symptoms   Respiratory: Positive for cough (in am) and shortness of breath (with exertion)  Cardiovascular: Negative for chest pain and palpitations  Gastrointestinal: Negative for abdominal pain, constipation, diarrhea, nausea and vomiting  Genitourinary: Positive for frequency  Negative for dysuria  Incontinence   Musculoskeletal: Positive for arthralgias  Neurological: Negative for dizziness and headaches           Objective:      /74   Pulse 76   Temp 98 4 °F (36 9 °C)   Ht 5' 1" (1 549 m)   Wt 80 8 kg (178 lb 3 2 oz)   SpO2 97%   BMI 33 67 kg/m² Physical Exam  Constitutional:       Appearance: She is well-developed  HENT:      Head: Normocephalic and atraumatic  Right Ear: External ear normal  There is impacted cerumen  Left Ear: External ear normal  There is no impacted cerumen  Eyes:      Conjunctiva/sclera: Conjunctivae normal    Neck:      Musculoskeletal: Neck supple  Cardiovascular:      Rate and Rhythm: Normal rate and regular rhythm  Heart sounds: Normal heart sounds  No murmur  Pulmonary:      Effort: Pulmonary effort is normal  No respiratory distress  Breath sounds: Normal breath sounds  No wheezing or rales  Abdominal:      General: There is no distension  Palpations: Abdomen is soft  There is no mass  Tenderness: There is no abdominal tenderness  There is no guarding or rebound  Musculoskeletal:      Right lower leg: No edema  Left lower leg: No edema  Skin:     General: Skin is warm and dry  Neurological:      Mental Status: She is alert and oriented to person, place, and time  Psychiatric:         Behavior: Behavior normal          Thought Content:  Thought content normal          Judgment: Judgment normal          Ear cerumen removal    Date/Time: 8/11/2020 10:29 AM  Performed by: Francine Castaneda MD  Authorized by: Francine Castaneda MD     Patient location:  Clinic  Indications / Diagnosis:  Impacted cerumen  Other Assisting Provider: No    Consent:     Consent obtained:  Verbal    Consent given by:  Patient    Risks discussed:  Bleeding, dizziness, infection, incomplete removal, pain and TM perforation    Alternatives discussed:  No treatment  Universal protocol:     Procedure explained and questions answered to patient or proxy's satisfaction: yes      Immediately prior to procedure a time out was called: yes      Patient identity confirmed:  Verbally with patient  Procedure details:     Local anesthetic:  None    Location:  R ear    Procedure type: irrigation with instrumentation Approach:  External  Post-procedure details:     Complication:  None    Hearing quality:  Improved    Patient tolerance of procedure:   Tolerated well, no immediate complications

## 2020-08-12 ENCOUNTER — TELEPHONE (OUTPATIENT)
Dept: INTERNAL MEDICINE CLINIC | Facility: CLINIC | Age: 85
End: 2020-08-12

## 2020-08-12 DIAGNOSIS — Z12.31 ENCOUNTER FOR SCREENING MAMMOGRAM FOR BREAST CANCER: Primary | ICD-10-CM

## 2020-08-13 DIAGNOSIS — E78.2 MIXED HYPERLIPIDEMIA: ICD-10-CM

## 2020-08-13 DIAGNOSIS — K21.9 GASTROESOPHAGEAL REFLUX DISEASE, ESOPHAGITIS PRESENCE NOT SPECIFIED: ICD-10-CM

## 2020-08-15 RX ORDER — PANTOPRAZOLE SODIUM 40 MG/1
40 TABLET, DELAYED RELEASE ORAL DAILY
Qty: 90 TABLET | Refills: 1 | Status: SHIPPED | OUTPATIENT
Start: 2020-08-15 | End: 2021-03-11 | Stop reason: SDUPTHER

## 2020-08-15 RX ORDER — SIMVASTATIN 40 MG
40 TABLET ORAL DAILY
Qty: 90 TABLET | Refills: 1 | Status: SHIPPED | OUTPATIENT
Start: 2020-08-15 | End: 2021-03-11 | Stop reason: SDUPTHER

## 2020-08-15 RX ORDER — FAMOTIDINE 20 MG/1
20 TABLET, FILM COATED ORAL DAILY
Qty: 90 TABLET | Refills: 1 | Status: SHIPPED | OUTPATIENT
Start: 2020-08-15 | End: 2021-03-11 | Stop reason: SDUPTHER

## 2020-08-15 RX ORDER — NIACIN 1000 MG/1
1000 TABLET, EXTENDED RELEASE ORAL
Qty: 90 TABLET | Refills: 1 | Status: SHIPPED | OUTPATIENT
Start: 2020-08-15 | End: 2021-03-11 | Stop reason: SDUPTHER

## 2020-08-18 ENCOUNTER — HOSPITAL ENCOUNTER (OUTPATIENT)
Dept: RADIOLOGY | Facility: IMAGING CENTER | Age: 85
Discharge: HOME | End: 2020-08-18
Payer: COMMERCIAL

## 2020-08-18 VITALS — HEIGHT: 61 IN | BODY MASS INDEX: 33.61 KG/M2 | WEIGHT: 178 LBS

## 2020-08-18 DIAGNOSIS — Z12.31 ENCOUNTER FOR SCREENING MAMMOGRAM FOR BREAST CANCER: ICD-10-CM

## 2020-08-18 PROCEDURE — 77067 SCR MAMMO BI INCL CAD: CPT

## 2020-10-30 ENCOUNTER — APPOINTMENT (EMERGENCY)
Dept: CT IMAGING | Facility: HOSPITAL | Age: 85
End: 2020-10-30
Payer: COMMERCIAL

## 2020-10-30 ENCOUNTER — APPOINTMENT (EMERGENCY)
Dept: RADIOLOGY | Facility: HOSPITAL | Age: 85
End: 2020-10-30
Payer: COMMERCIAL

## 2020-10-30 ENCOUNTER — HOSPITAL ENCOUNTER (EMERGENCY)
Facility: HOSPITAL | Age: 85
Discharge: HOME | End: 2020-10-30
Attending: EMERGENCY MEDICINE | Admitting: EMERGENCY MEDICINE
Payer: COMMERCIAL

## 2020-10-30 VITALS
DIASTOLIC BLOOD PRESSURE: 61 MMHG | BODY MASS INDEX: 32.78 KG/M2 | OXYGEN SATURATION: 95 % | RESPIRATION RATE: 20 BRPM | WEIGHT: 173.5 LBS | HEART RATE: 56 BPM | TEMPERATURE: 98.5 F | SYSTOLIC BLOOD PRESSURE: 134 MMHG

## 2020-10-30 DIAGNOSIS — U07.1 COVID-19 VIRUS INFECTION: Primary | ICD-10-CM

## 2020-10-30 LAB
ALBUMIN SERPL BCP-MCNC: 3.4 G/DL (ref 3.5–5)
ALP SERPL-CCNC: 107 U/L (ref 46–116)
ALT SERPL W P-5'-P-CCNC: 22 U/L (ref 12–78)
ANION GAP SERPL CALCULATED.3IONS-SCNC: 11 MMOL/L (ref 4–13)
AST SERPL W P-5'-P-CCNC: 47 U/L (ref 5–45)
ATRIAL RATE: 66 BPM
BASOPHILS # BLD AUTO: 0.02 THOUSANDS/ΜL (ref 0–0.1)
BASOPHILS NFR BLD AUTO: 0 % (ref 0–1)
BILIRUB SERPL-MCNC: 0.5 MG/DL (ref 0.2–1)
BUN SERPL-MCNC: 11 MG/DL (ref 5–25)
CALCIUM ALBUM COR SERPL-MCNC: 9.6 MG/DL (ref 8.3–10.1)
CALCIUM SERPL-MCNC: 9.1 MG/DL (ref 8.3–10.1)
CHLORIDE SERPL-SCNC: 105 MMOL/L (ref 100–108)
CO2 SERPL-SCNC: 24 MMOL/L (ref 21–32)
CREAT SERPL-MCNC: 0.88 MG/DL (ref 0.6–1.3)
EOSINOPHIL # BLD AUTO: 0.01 THOUSAND/ΜL (ref 0–0.61)
EOSINOPHIL NFR BLD AUTO: 0 % (ref 0–6)
ERYTHROCYTE [DISTWIDTH] IN BLOOD BY AUTOMATED COUNT: 13.1 % (ref 11.6–15.1)
GFR SERPL CREATININE-BSD FRML MDRD: 60 ML/MIN/1.73SQ M
GLUCOSE SERPL-MCNC: 106 MG/DL (ref 65–140)
HCT VFR BLD AUTO: 39.4 % (ref 34.8–46.1)
HGB BLD-MCNC: 12.6 G/DL (ref 11.5–15.4)
IMM GRANULOCYTES # BLD AUTO: 0.01 THOUSAND/UL (ref 0–0.2)
IMM GRANULOCYTES NFR BLD AUTO: 0 % (ref 0–2)
LACTATE SERPL-SCNC: 1.5 MMOL/L (ref 0.5–2)
LIPASE SERPL-CCNC: 100 U/L (ref 73–393)
LYMPHOCYTES # BLD AUTO: 1.59 THOUSANDS/ΜL (ref 0.6–4.47)
LYMPHOCYTES NFR BLD AUTO: 29 % (ref 14–44)
MCH RBC QN AUTO: 33.4 PG (ref 26.8–34.3)
MCHC RBC AUTO-ENTMCNC: 32 G/DL (ref 31.4–37.4)
MCV RBC AUTO: 105 FL (ref 82–98)
MONOCYTES # BLD AUTO: 0.44 THOUSAND/ΜL (ref 0.17–1.22)
MONOCYTES NFR BLD AUTO: 8 % (ref 4–12)
NEUTROPHILS # BLD AUTO: 3.33 THOUSANDS/ΜL (ref 1.85–7.62)
NEUTS SEG NFR BLD AUTO: 63 % (ref 43–75)
NRBC BLD AUTO-RTO: 0 /100 WBCS
NT-PROBNP SERPL-MCNC: 202 PG/ML
P AXIS: 61 DEGREES
PLATELET # BLD AUTO: 120 THOUSANDS/UL (ref 149–390)
PMV BLD AUTO: 9.4 FL (ref 8.9–12.7)
POTASSIUM SERPL-SCNC: 3.9 MMOL/L (ref 3.5–5.3)
PR INTERVAL: 182 MS
PROT SERPL-MCNC: 6.9 G/DL (ref 6.4–8.2)
QRS AXIS: 29 DEGREES
QRSD INTERVAL: 82 MS
QT INTERVAL: 404 MS
QTC INTERVAL: 414 MS
RBC # BLD AUTO: 3.77 MILLION/UL (ref 3.81–5.12)
SARS-COV-2 RNA RESP QL NAA+PROBE: POSITIVE
SODIUM SERPL-SCNC: 140 MMOL/L (ref 136–145)
T WAVE AXIS: 45 DEGREES
TROPONIN I SERPL-MCNC: <0.02 NG/ML
VENTRICULAR RATE: 63 BPM
WBC # BLD AUTO: 5.4 THOUSAND/UL (ref 4.31–10.16)

## 2020-10-30 PROCEDURE — 85025 COMPLETE CBC W/AUTO DIFF WBC: CPT | Performed by: EMERGENCY MEDICINE

## 2020-10-30 PROCEDURE — 99285 EMERGENCY DEPT VISIT HI MDM: CPT

## 2020-10-30 PROCEDURE — 83690 ASSAY OF LIPASE: CPT | Performed by: EMERGENCY MEDICINE

## 2020-10-30 PROCEDURE — 93005 ELECTROCARDIOGRAM TRACING: CPT

## 2020-10-30 PROCEDURE — 80053 COMPREHEN METABOLIC PANEL: CPT | Performed by: EMERGENCY MEDICINE

## 2020-10-30 PROCEDURE — 71045 X-RAY EXAM CHEST 1 VIEW: CPT

## 2020-10-30 PROCEDURE — 99285 EMERGENCY DEPT VISIT HI MDM: CPT | Performed by: EMERGENCY MEDICINE

## 2020-10-30 PROCEDURE — 36415 COLL VENOUS BLD VENIPUNCTURE: CPT | Performed by: EMERGENCY MEDICINE

## 2020-10-30 PROCEDURE — 83605 ASSAY OF LACTIC ACID: CPT | Performed by: EMERGENCY MEDICINE

## 2020-10-30 PROCEDURE — 93010 ELECTROCARDIOGRAM REPORT: CPT | Performed by: INTERNAL MEDICINE

## 2020-10-30 PROCEDURE — 87635 SARS-COV-2 COVID-19 AMP PRB: CPT | Performed by: EMERGENCY MEDICINE

## 2020-10-30 PROCEDURE — 96374 THER/PROPH/DIAG INJ IV PUSH: CPT

## 2020-10-30 PROCEDURE — 83880 ASSAY OF NATRIURETIC PEPTIDE: CPT | Performed by: EMERGENCY MEDICINE

## 2020-10-30 PROCEDURE — 84484 ASSAY OF TROPONIN QUANT: CPT | Performed by: EMERGENCY MEDICINE

## 2020-10-30 RX ORDER — ONDANSETRON 2 MG/ML
4 INJECTION INTRAMUSCULAR; INTRAVENOUS ONCE
Status: COMPLETED | OUTPATIENT
Start: 2020-10-30 | End: 2020-10-30

## 2020-10-30 RX ADMIN — SODIUM CHLORIDE 500 ML: 0.9 INJECTION, SOLUTION INTRAVENOUS at 13:40

## 2020-10-30 RX ADMIN — ONDANSETRON 4 MG: 2 INJECTION INTRAMUSCULAR; INTRAVENOUS at 13:41

## 2020-11-05 ENCOUNTER — TELEMEDICINE (OUTPATIENT)
Dept: INTERNAL MEDICINE CLINIC | Facility: CLINIC | Age: 85
End: 2020-11-05
Payer: COMMERCIAL

## 2020-11-05 DIAGNOSIS — U07.1 COVID-19 VIRUS INFECTION: Primary | ICD-10-CM

## 2020-11-05 PROCEDURE — 99442 PR PHYS/QHP TELEPHONE EVALUATION 11-20 MIN: CPT | Performed by: INTERNAL MEDICINE

## 2020-11-11 ENCOUNTER — TELEPHONE (OUTPATIENT)
Dept: INTERNAL MEDICINE CLINIC | Facility: CLINIC | Age: 85
End: 2020-11-11

## 2020-11-11 DIAGNOSIS — N30.00 ACUTE CYSTITIS WITHOUT HEMATURIA: Primary | ICD-10-CM

## 2020-11-11 RX ORDER — CEFPODOXIME PROXETIL 100 MG/1
100 TABLET, FILM COATED ORAL 2 TIMES DAILY
Qty: 6 TABLET | Refills: 0 | Status: SHIPPED | OUTPATIENT
Start: 2020-11-11 | End: 2021-01-25 | Stop reason: SDUPTHER

## 2020-12-10 ENCOUNTER — OFFICE VISIT (OUTPATIENT)
Dept: CARDIOLOGY CLINIC | Facility: CLINIC | Age: 85
End: 2020-12-10
Payer: COMMERCIAL

## 2020-12-10 VITALS
DIASTOLIC BLOOD PRESSURE: 78 MMHG | HEIGHT: 61 IN | WEIGHT: 177.2 LBS | SYSTOLIC BLOOD PRESSURE: 130 MMHG | BODY MASS INDEX: 33.46 KG/M2 | HEART RATE: 60 BPM

## 2020-12-10 DIAGNOSIS — I25.10 CAD IN NATIVE ARTERY: Primary | ICD-10-CM

## 2020-12-10 DIAGNOSIS — E78.2 MIXED HYPERLIPIDEMIA: ICD-10-CM

## 2020-12-10 PROCEDURE — 3078F DIAST BP <80 MM HG: CPT | Performed by: INTERNAL MEDICINE

## 2020-12-10 PROCEDURE — 3075F SYST BP GE 130 - 139MM HG: CPT | Performed by: INTERNAL MEDICINE

## 2020-12-10 PROCEDURE — 1036F TOBACCO NON-USER: CPT | Performed by: INTERNAL MEDICINE

## 2020-12-10 PROCEDURE — 1160F RVW MEDS BY RX/DR IN RCRD: CPT | Performed by: INTERNAL MEDICINE

## 2020-12-10 PROCEDURE — 99214 OFFICE O/P EST MOD 30 MIN: CPT | Performed by: INTERNAL MEDICINE

## 2021-01-21 ENCOUNTER — HOSPITAL ENCOUNTER (OUTPATIENT)
Dept: NON INVASIVE DIAGNOSTICS | Facility: CLINIC | Age: 86
Discharge: HOME | End: 2021-01-21
Payer: COMMERCIAL

## 2021-01-21 DIAGNOSIS — I25.10 CAD IN NATIVE ARTERY: ICD-10-CM

## 2021-01-21 DIAGNOSIS — E78.2 MIXED HYPERLIPIDEMIA: ICD-10-CM

## 2021-01-21 PROCEDURE — A9502 TC99M TETROFOSMIN: HCPCS

## 2021-01-21 PROCEDURE — 93306 TTE W/DOPPLER COMPLETE: CPT

## 2021-01-21 PROCEDURE — 78452 HT MUSCLE IMAGE SPECT MULT: CPT

## 2021-01-21 PROCEDURE — G1004 CDSM NDSC: HCPCS

## 2021-01-21 PROCEDURE — 93016 CV STRESS TEST SUPVJ ONLY: CPT | Performed by: INTERNAL MEDICINE

## 2021-01-21 PROCEDURE — 78452 HT MUSCLE IMAGE SPECT MULT: CPT | Performed by: INTERNAL MEDICINE

## 2021-01-21 PROCEDURE — 93306 TTE W/DOPPLER COMPLETE: CPT | Performed by: INTERNAL MEDICINE

## 2021-01-21 PROCEDURE — 93018 CV STRESS TEST I&R ONLY: CPT | Performed by: INTERNAL MEDICINE

## 2021-01-21 PROCEDURE — 93017 CV STRESS TEST TRACING ONLY: CPT

## 2021-01-21 RX ADMIN — REGADENOSON 0.4 MG: 0.08 INJECTION, SOLUTION INTRAVENOUS at 13:35

## 2021-01-22 LAB
CHEST PAIN STATEMENT: NORMAL
MAX DIASTOLIC BP: 96 MMHG
MAX HEART RATE: 120 BPM
MAX PREDICTED HEART RATE: 135 BPM
MAX. SYSTOLIC BP: 158 MMHG
PROTOCOL NAME: NORMAL
REASON FOR TERMINATION: NORMAL
TARGET HR FORMULA: NORMAL
TEST INDICATION: NORMAL
TIME IN EXERCISE PHASE: NORMAL

## 2021-01-25 ENCOUNTER — TELEPHONE (OUTPATIENT)
Dept: INTERNAL MEDICINE CLINIC | Facility: CLINIC | Age: 86
End: 2021-01-25

## 2021-01-25 ENCOUNTER — APPOINTMENT (OUTPATIENT)
Dept: LAB | Facility: CLINIC | Age: 86
End: 2021-01-25
Payer: COMMERCIAL

## 2021-01-25 DIAGNOSIS — N30.00 ACUTE CYSTITIS WITHOUT HEMATURIA: ICD-10-CM

## 2021-01-25 DIAGNOSIS — I25.10 CAD IN NATIVE ARTERY: ICD-10-CM

## 2021-01-25 DIAGNOSIS — E78.2 MIXED HYPERLIPIDEMIA: ICD-10-CM

## 2021-01-25 LAB
ALBUMIN SERPL BCP-MCNC: 3.5 G/DL (ref 3.5–5)
ALP SERPL-CCNC: 101 U/L (ref 46–116)
ALT SERPL W P-5'-P-CCNC: 26 U/L (ref 12–78)
ANION GAP SERPL CALCULATED.3IONS-SCNC: 8 MMOL/L (ref 4–13)
AST SERPL W P-5'-P-CCNC: 25 U/L (ref 5–45)
BILIRUB SERPL-MCNC: 0.96 MG/DL (ref 0.2–1)
BUN SERPL-MCNC: 10 MG/DL (ref 5–25)
CALCIUM SERPL-MCNC: 9 MG/DL (ref 8.3–10.1)
CHLORIDE SERPL-SCNC: 107 MMOL/L (ref 100–108)
CHOLEST SERPL-MCNC: 143 MG/DL (ref 50–200)
CO2 SERPL-SCNC: 28 MMOL/L (ref 21–32)
CREAT SERPL-MCNC: 0.85 MG/DL (ref 0.6–1.3)
ERYTHROCYTE [DISTWIDTH] IN BLOOD BY AUTOMATED COUNT: 13.3 % (ref 11.6–15.1)
GFR SERPL CREATININE-BSD FRML MDRD: 63 ML/MIN/1.73SQ M
GLUCOSE P FAST SERPL-MCNC: 108 MG/DL (ref 65–99)
HCT VFR BLD AUTO: 39.1 % (ref 34.8–46.1)
HDLC SERPL-MCNC: 71 MG/DL
HGB BLD-MCNC: 12.6 G/DL (ref 11.5–15.4)
LDLC SERPL CALC-MCNC: 56 MG/DL (ref 0–100)
MCH RBC QN AUTO: 33.5 PG (ref 26.8–34.3)
MCHC RBC AUTO-ENTMCNC: 32.2 G/DL (ref 31.4–37.4)
MCV RBC AUTO: 104 FL (ref 82–98)
PLATELET # BLD AUTO: 148 THOUSANDS/UL (ref 149–390)
PMV BLD AUTO: 9.6 FL (ref 8.9–12.7)
POTASSIUM SERPL-SCNC: 4 MMOL/L (ref 3.5–5.3)
PROT SERPL-MCNC: 6.6 G/DL (ref 6.4–8.2)
RBC # BLD AUTO: 3.76 MILLION/UL (ref 3.81–5.12)
SODIUM SERPL-SCNC: 143 MMOL/L (ref 136–145)
TRIGL SERPL-MCNC: 78 MG/DL
WBC # BLD AUTO: 9.52 THOUSAND/UL (ref 4.31–10.16)

## 2021-01-25 PROCEDURE — 80053 COMPREHEN METABOLIC PANEL: CPT

## 2021-01-25 PROCEDURE — 85027 COMPLETE CBC AUTOMATED: CPT

## 2021-01-25 PROCEDURE — 80061 LIPID PANEL: CPT

## 2021-01-25 PROCEDURE — 36415 COLL VENOUS BLD VENIPUNCTURE: CPT

## 2021-01-25 RX ORDER — CEFPODOXIME PROXETIL 100 MG/1
100 TABLET, FILM COATED ORAL 2 TIMES DAILY
Qty: 6 TABLET | Refills: 0 | Status: SHIPPED | OUTPATIENT
Start: 2021-01-25 | End: 2021-01-28

## 2021-01-25 NOTE — TELEPHONE ENCOUNTER
Pt calling in stating she has the beginning of a UTI - started Saturday with the urge to go, little bit of burning wants what you sent into the pharmacy last time sent in

## 2021-03-04 ENCOUNTER — RA CDI HCC (OUTPATIENT)
Dept: OTHER | Facility: HOSPITAL | Age: 86
End: 2021-03-04

## 2021-03-04 NOTE — PROGRESS NOTES
[facility] 75  coding oppertunities          Chart reviewed, no opportunity found: CHART REVIEWED, NO OPPORTUNITY FOUND

## 2021-03-11 ENCOUNTER — OFFICE VISIT (OUTPATIENT)
Dept: INTERNAL MEDICINE CLINIC | Facility: CLINIC | Age: 86
End: 2021-03-11
Payer: COMMERCIAL

## 2021-03-11 VITALS
SYSTOLIC BLOOD PRESSURE: 122 MMHG | HEIGHT: 61 IN | DIASTOLIC BLOOD PRESSURE: 72 MMHG | BODY MASS INDEX: 33.08 KG/M2 | OXYGEN SATURATION: 98 % | WEIGHT: 175.2 LBS | HEART RATE: 84 BPM | TEMPERATURE: 97.7 F

## 2021-03-11 DIAGNOSIS — M54.50 CHRONIC BILATERAL LOW BACK PAIN WITHOUT SCIATICA: ICD-10-CM

## 2021-03-11 DIAGNOSIS — E78.2 MIXED HYPERLIPIDEMIA: ICD-10-CM

## 2021-03-11 DIAGNOSIS — B35.1 ONYCHOMYCOSIS OF TOENAIL: ICD-10-CM

## 2021-03-11 DIAGNOSIS — Z00.00 MEDICARE ANNUAL WELLNESS VISIT, SUBSEQUENT: ICD-10-CM

## 2021-03-11 DIAGNOSIS — I25.10 CORONARY ARTERY DISEASE INVOLVING NATIVE CORONARY ARTERY OF NATIVE HEART, ANGINA PRESENCE UNSPECIFIED: Primary | ICD-10-CM

## 2021-03-11 DIAGNOSIS — R73.03 PREDIABETES: ICD-10-CM

## 2021-03-11 DIAGNOSIS — G89.29 CHRONIC BILATERAL LOW BACK PAIN WITHOUT SCIATICA: ICD-10-CM

## 2021-03-11 DIAGNOSIS — M20.41 ACQUIRED HAMMERTOE OF RIGHT FOOT: ICD-10-CM

## 2021-03-11 DIAGNOSIS — K21.9 GASTROESOPHAGEAL REFLUX DISEASE: ICD-10-CM

## 2021-03-11 PROCEDURE — 1036F TOBACCO NON-USER: CPT | Performed by: INTERNAL MEDICINE

## 2021-03-11 PROCEDURE — 1125F AMNT PAIN NOTED PAIN PRSNT: CPT | Performed by: INTERNAL MEDICINE

## 2021-03-11 PROCEDURE — 1160F RVW MEDS BY RX/DR IN RCRD: CPT | Performed by: INTERNAL MEDICINE

## 2021-03-11 PROCEDURE — 3288F FALL RISK ASSESSMENT DOCD: CPT | Performed by: INTERNAL MEDICINE

## 2021-03-11 PROCEDURE — G0439 PPPS, SUBSEQ VISIT: HCPCS | Performed by: INTERNAL MEDICINE

## 2021-03-11 PROCEDURE — 99214 OFFICE O/P EST MOD 30 MIN: CPT | Performed by: INTERNAL MEDICINE

## 2021-03-11 PROCEDURE — 1170F FXNL STATUS ASSESSED: CPT | Performed by: INTERNAL MEDICINE

## 2021-03-11 RX ORDER — NITROGLYCERIN 0.4 MG/1
0.4 TABLET SUBLINGUAL
Qty: 25 TABLET | Refills: 0 | Status: SHIPPED | OUTPATIENT
Start: 2021-03-11

## 2021-03-11 RX ORDER — SIMVASTATIN 40 MG
40 TABLET ORAL DAILY
Qty: 90 TABLET | Refills: 1 | Status: SHIPPED | OUTPATIENT
Start: 2021-03-11 | End: 2021-12-05

## 2021-03-11 RX ORDER — PANTOPRAZOLE SODIUM 40 MG/1
40 TABLET, DELAYED RELEASE ORAL DAILY
Qty: 90 TABLET | Refills: 1 | Status: SHIPPED | OUTPATIENT
Start: 2021-03-11 | End: 2021-12-05

## 2021-03-11 RX ORDER — FAMOTIDINE 20 MG/1
20 TABLET, FILM COATED ORAL DAILY
Qty: 90 TABLET | Refills: 1 | Status: SHIPPED | OUTPATIENT
Start: 2021-03-11 | End: 2021-09-14

## 2021-03-11 RX ORDER — NIACIN 1000 MG/1
1000 TABLET, EXTENDED RELEASE ORAL
Qty: 90 TABLET | Refills: 1 | Status: SHIPPED | OUTPATIENT
Start: 2021-03-11 | End: 2021-12-05

## 2021-03-11 NOTE — PROGRESS NOTES
Assessment/Plan:  Continue current medications  XR for chronic low backpain  Agreeable to trying PT  May continue Tylenol PRN       Problem List Items Addressed This Visit        Other    Hyperlipidemia    Relevant Medications    simvastatin (ZOCOR) 40 mg tablet    niacin (NIASPAN) 1000 MG CR tablet    Other Relevant Orders    Comprehensive metabolic panel    CBC and Platelet    Lipid Panel with Direct LDL reflex      Other Visit Diagnoses     Coronary artery disease involving native coronary artery of native heart, angina presence unspecified    -  Primary    Relevant Medications    nitroglycerin (NITROSTAT) 0 4 mg SL tablet    Other Relevant Orders    Comprehensive metabolic panel    CBC and Platelet    Lipid Panel with Direct LDL reflex    Gastroesophageal reflux disease        Relevant Medications    pantoprazole (PROTONIX) 40 mg tablet    famotidine (PEPCID) 20 mg tablet    Acquired hammertoe of right foot        Relevant Orders    Ambulatory referral to Podiatry    Onychomycosis of toenail        Relevant Orders    Ambulatory referral to Podiatry    Chronic bilateral low back pain without sciatica        Relevant Orders    XR spine lumbar minimum 4 views non injury    Prediabetes        Relevant Orders    Comprehensive metabolic panel    HEMOGLOBIN A1C W/ EAG ESTIMATION    Medicare annual wellness visit, subsequent                Subjective:      Patient ID: Madiha Chou is a 80 y o  female      HPI  Here for a follow up  C/o chronic low back pain, not radiating to the LE  No previous injury  Tylenol or Motrin help  Also asking for a foot doctor for toenail fungus and hammertoes  SOB with exertion, no CP  Normal stress test  Echo with gr 1 diastolic dysfunction  Labs in January reviewed-lipids well controlled  COVID in November  Multiple vaccine allergies so not interested in vaccine    The following portions of the patient's history were reviewed and updated as appropriate: allergies, current medications, past family history, past medical history, past social history, past surgical history and problem list     Review of Systems   Constitutional: Negative for chills, fatigue, fever and unexpected weight change  HENT: Negative for congestion, rhinorrhea and sore throat  Respiratory: Positive for shortness of breath (with moderate exertion)  Negative for cough and wheezing  Cardiovascular: Negative for chest pain, palpitations and leg swelling  Gastrointestinal: Negative for abdominal pain, constipation, diarrhea, nausea and vomiting  Genitourinary: Negative for difficulty urinating  Musculoskeletal: Positive for back pain (chronic, non radiating and takes Tylenol or Motrin as needd)  Negative for arthralgias and myalgias  Right foot arthritis hammertoe and fungal infection  Wants to see podiatrist   Neurological: Negative for dizziness and headaches  Objective:      /72   Pulse 84   Temp 97 7 °F (36 5 °C)   Ht 5' 1" (1 549 m)   Wt 79 5 kg (175 lb 3 2 oz)   SpO2 98%   BMI 33 10 kg/m²          Physical Exam  Constitutional:       Appearance: She is well-developed  HENT:      Head: Normocephalic and atraumatic  Right Ear: External ear normal       Left Ear: External ear normal    Eyes:      Conjunctiva/sclera: Conjunctivae normal    Neck:      Musculoskeletal: Neck supple  Cardiovascular:      Rate and Rhythm: Normal rate and regular rhythm  Heart sounds: Normal heart sounds  No murmur  Pulmonary:      Effort: Pulmonary effort is normal  No respiratory distress  Breath sounds: Normal breath sounds  No wheezing or rales  Abdominal:      General: Bowel sounds are normal  There is no distension  Palpations: Abdomen is soft  There is no mass  Tenderness: There is no abdominal tenderness  There is no guarding or rebound  Comments: Lower quadrants to deep palpation   Musculoskeletal: Normal range of motion           General: Deformity (hammertoe right) present  Right lower leg: No edema  Left lower leg: No edema  Skin:     General: Skin is warm and dry  Comments: Chalky white toenails on the right foot   Neurological:      Mental Status: She is alert and oriented to person, place, and time  Psychiatric:         Behavior: Behavior normal          Thought Content:  Thought content normal          Judgment: Judgment normal

## 2021-03-11 NOTE — PROGRESS NOTES
Assessment and Plan:     Problem List Items Addressed This Visit        Other    Hyperlipidemia    Relevant Medications    simvastatin (ZOCOR) 40 mg tablet    niacin (NIASPAN) 1000 MG CR tablet    Other Relevant Orders    Comprehensive metabolic panel    CBC and Platelet    Lipid Panel with Direct LDL reflex      Other Visit Diagnoses     Coronary artery disease involving native coronary artery of native heart, angina presence unspecified    -  Primary    Relevant Medications    nitroglycerin (NITROSTAT) 0 4 mg SL tablet    Other Relevant Orders    Comprehensive metabolic panel    CBC and Platelet    Lipid Panel with Direct LDL reflex    Gastroesophageal reflux disease        Relevant Medications    pantoprazole (PROTONIX) 40 mg tablet    famotidine (PEPCID) 20 mg tablet    Acquired hammertoe of right foot        Relevant Orders    Ambulatory referral to Podiatry    Onychomycosis of toenail        Relevant Orders    Ambulatory referral to Podiatry    Chronic bilateral low back pain without sciatica        Relevant Orders    XR spine lumbar minimum 4 views non injury    Prediabetes        Relevant Orders    Comprehensive metabolic panel    HEMOGLOBIN A1C W/ EAG ESTIMATION    Medicare annual wellness visit, subsequent            BMI Counseling: Body mass index is 33 1 kg/m²  The BMI is above normal  Nutrition recommendations include encouraging healthy choices of fruits and vegetables and moderation in carbohydrate intake  Preventive health issues were discussed with patient, and age appropriate screening tests were ordered as noted in patient's After Visit Summary  Personalized health advice and appropriate referrals for health education or preventive services given if needed, as noted in patient's After Visit Summary  History of Present Illness:     Patient presents for Welcome to Medicare visit       Patient Care Team:  Melly Leo MD as PCP - DO Ana Vargas MD Hillery Griffith MD Radha Ohara MD Raenelle Makos Od (Optometry)     Review of Systems:     Review of Systems   Problem List:     Patient Active Problem List   Diagnosis    Ambulatory dysfunction    H/O right knee surgery    CAD in native artery    History of cardiac catheterization    Hyperlipidemia    Multiple allergies    Bursitis of left hip    Osteoporosis    Esophageal reflux    Class 1 obesity due to excess calories without serious comorbidity with body mass index (BMI) of 32 0 to 32 9 in adult    Ingrowing toenail with infection    Primary osteoarthritis of right shoulder    Chronic midline low back pain without sciatica    Localized edema    Stress incontinence of urine      Past Medical and Surgical History:     Past Medical History:   Diagnosis Date    Arthritis     Cardiac disease     Hyperlipidemia     Hypertension     Hypertension, essential, benign 11/6/2018    Preop cardiovascular exam 11/5/2019    Rash 6/3/2020    UTI (urinary tract infection) 5/31/2017     Past Surgical History:   Procedure Laterality Date    APPENDECTOMY      CORONARY ANGIOPLASTY WITH STENT PLACEMENT      CYSTOSCOPY  12/29/2014    Diagnostic    HERNIA REPAIR      JOINT REPLACEMENT      bilat knee, bilat hip, L shoulder    KNEE SURGERY Bilateral     SHOULDER SURGERY  2012    TOTAL HIP ARTHROPLASTY Bilateral       Family History:     Family History   Problem Relation Age of Onset    Breast cancer Mother 80    Leukemia Mother     Gout Mother     Hypertension Mother     Bone cancer Father 64    Cervical cancer Sister 52    Aneurysm Family     Heart attack Family         myocardial infarction    Rheum arthritis Family     Lung cancer Sister 70    No Known Problems Daughter     No Known Problems Sister     No Known Problems Daughter     No Known Problems Daughter     No Known Problems Son     No Known Problems Maternal Aunt     No Known Problems Maternal Aunt     No Known Problems Maternal Aunt  No Known Problems Paternal Aunt       Social History:        Social History     Socioeconomic History    Marital status:      Spouse name: None    Number of children: 3    Years of education: None    Highest education level: None   Occupational History    Occupation: Retired   Social Needs    Financial resource strain: None    Food insecurity     Worry: None     Inability: None    Transportation needs     Medical: None     Non-medical: None   Tobacco Use    Smoking status: Never Smoker    Smokeless tobacco: Never Used   Substance and Sexual Activity    Alcohol use: No     Comment: social alcohol use, per [employer]    Drug use: No    Sexual activity: None   Lifestyle    Physical activity     Days per week: None     Minutes per session: None    Stress: None   Relationships    Social connections     Talks on phone: None     Gets together: None     Attends [employer]ism service: None     Active member of club or organization: None     Attends meetings of clubs or organizations: None     Relationship status: None    Intimate partner violence     Fear of current or ex partner: None     Emotionally abused: None     Physically abused: None     Forced sexual activity: None   Other Topics Concern    None   Social History Narrative    Caffeine use, active    Completed 8th grade    No caffeine use, active, per [employer]      Medications and Allergies:     Current Outpatient Medications   Medication Sig Dispense Refill    acetaminophen (TYLENOL ARTHRITIS PAIN) 650 mg CR tablet Take by mouth      Ascorbic Acid (VITAMIN C) 1000 MG tablet Take 1,000 mg by mouth daily      aspirin 81 MG tablet Take 81 mg by mouth daily      calcium citrate-vitamin D (CITRACAL+D) 315-200 MG-UNIT per tablet Take 1 tablet by mouth 2 (two) times a day      Cholecalciferol (VITAMIN D3) 1000 units CAPS Take 1 tablet by mouth daily      Multiple Vitamins-Minerals (CENTRUM ADULTS PO) Take 1 tablet by mouth daily      famotidine (PEPCID) 20 mg tablet Take 1 tablet (20 mg total) by mouth daily 90 tablet 1    niacin (NIASPAN) 1000 MG CR tablet Take 1 tablet (1,000 mg total) by mouth daily at bedtime 90 tablet 1    nitroglycerin (NITROSTAT) 0 4 mg SL tablet Place 1 tablet (0 4 mg total) under the tongue every 5 (five) minutes as needed for chest pain Up to 3 doses  Call 911 if pain persists 25 tablet 0    pantoprazole (PROTONIX) 40 mg tablet Take 1 tablet (40 mg total) by mouth daily 90 tablet 1    simvastatin (ZOCOR) 40 mg tablet Take 1 tablet (40 mg total) by mouth daily 90 tablet 1     No current facility-administered medications for this visit  Allergies   Allergen Reactions    Cephalexin Hives    Ciprofloxacin Hives    Cortisone Headache    Cortizone-10 [Hydrocortisone] Headache    Cyclophosphamide Hives    Dexamethasone Hives    Erythromycin Hives    Flu Virus Vaccine Hives    Iodinated Casein     Loratadine     Macrobid  [Nitrofurantoin Monohyd Macro] Hives    Motrin [Ibuprofen] Itching    Mucinex Chest Congestion Child [Guaifenesin] Itching    Nitrofurantoin     Omeprazole     Oxycodone-Acetaminophen Other (See Comments)    Penicillins     Percocet  [Oxycodone-Acetaminophen]     Pneumococcal Vac Polyvalent Hives    Povidone Iodine     Sulfa Antibiotics       Immunizations: There is no immunization history for the selected administration types on file for this patient  Health Maintenance: There are no preventive care reminders to display for this patient  Topic Date Due    COVID-19 Vaccine (1 of 2) 10/28/1951      Medicare Screening Tests and Risk Assessments:     Molly Piper is here for her Subsequent Wellness visit  Health Risk Assessment:   Patient rates overall health as good  Patient feels that their physical health rating is same  Patient is satisfied with their life  Eyesight was rated as same  Hearing was rated as slightly worse   Patient feels that their emotional and mental health rating is same  Patients states they are sometimes angry  Patient states they are often unusually tired/fatigued  Pain experienced in the last 7 days has been a lot  Patient's pain rating has been 8/10  Patient states that she has experienced no weight loss or gain in last 6 months  Back pain    Fall Risk Screening: In the past year, patient has experienced: no history of falling in past year      Urinary Incontinence Screening:   Patient has not leaked urine accidently in the last six months  Home Safety:  Patient has trouble with stairs inside or outside of their home  Patient has working smoke alarms and has working carbon monoxide detector  Home safety hazards include: loose rugs on the floor  Nutrition:   Current diet is Regular and No Added Salt  Medications:   Patient is currently taking over-the-counter supplements  OTC medications include: see medication list  Patient is able to manage medications  Activities of Daily Living (ADLs)/Instrumental Activities of Daily Living (IADLs):   Walk and transfer into and out of bed and chair?: Yes  Dress and groom yourself?: Yes    Bathe or shower yourself?: Yes    Feed yourself?  Yes  Do your laundry/housekeeping?: Yes  Manage your money, pay your bills and track your expenses?: Yes  Make your own meals?: Yes    Do your own shopping?: Yes    Previous Hospitalizations:   Any hospitalizations or ED visits within the last 12 months?: Yes    How many hospitalizations have you had in the last year?: more than 4    Advance Care Planning:   Living will: No    Durable POA for healthcare: No    Advanced directive: Yes      Cognitive Screening:   Provider or family/friend/caregiver concerned regarding cognition?: No    PREVENTIVE SCREENINGS      Cardiovascular Screening:    General: Screening Not Indicated and History Lipid Disorder      Diabetes Screening:     General: Screening Current      Colorectal Cancer Screening:     General: Screening Not Indicated      Breast Cancer Screening:     General: Screening Current      Cervical Cancer Screening:    General: Screening Not Indicated      Osteoporosis Screening:    General: Screening Not Indicated and History Osteoporosis      Abdominal Aortic Aneurysm (AAA) Screening:        General: Screening Not Indicated      Lung Cancer Screening:     General: Screening Not Indicated      Hepatitis C Screening:    General: Screening Not Indicated    No exam data present     Physical Exam:     /72   Pulse 84   Temp 97 7 °F (36 5 °C)   Ht 5' 1" (1 549 m)   Wt 79 5 kg (175 lb 3 2 oz)   SpO2 98%   BMI 33 10 kg/m²     Physical Exam     Isidra Encinas MD

## 2021-04-02 ENCOUNTER — OFFICE VISIT (OUTPATIENT)
Dept: PODIATRY | Facility: CLINIC | Age: 86
End: 2021-04-02
Payer: COMMERCIAL

## 2021-04-02 VITALS
WEIGHT: 175 LBS | DIASTOLIC BLOOD PRESSURE: 78 MMHG | HEART RATE: 80 BPM | SYSTOLIC BLOOD PRESSURE: 134 MMHG | HEIGHT: 61 IN | BODY MASS INDEX: 33.04 KG/M2

## 2021-04-02 DIAGNOSIS — I73.9 PERIPHERAL VASCULAR DISEASE, UNSPECIFIED (HCC): ICD-10-CM

## 2021-04-02 DIAGNOSIS — M20.41 ACQUIRED HAMMERTOE OF RIGHT FOOT: Primary | ICD-10-CM

## 2021-04-02 DIAGNOSIS — B35.1 ONYCHOMYCOSIS OF TOENAIL: ICD-10-CM

## 2021-04-02 DIAGNOSIS — M20.42 HAMMER TOE OF LEFT FOOT: ICD-10-CM

## 2021-04-02 DIAGNOSIS — L60.0 INGROWN TOENAIL: ICD-10-CM

## 2021-04-02 PROCEDURE — 99202 OFFICE O/P NEW SF 15 MIN: CPT | Performed by: PODIATRIST

## 2021-04-02 PROCEDURE — 1160F RVW MEDS BY RX/DR IN RCRD: CPT | Performed by: PODIATRIST

## 2021-04-02 PROCEDURE — 1036F TOBACCO NON-USER: CPT | Performed by: PODIATRIST

## 2021-04-02 NOTE — PROGRESS NOTES
Assessment/Plan:      Explained to patient that the right 2nd toenail is painful and dystrophic most likely from trauma over the years and her hammertoe deformity  Surgical intervention for the hammertoe is needed for correction but patient is caring for  Her son at this time  Also recommended arterial Doppler studies before recommending any procedures  Patient to consider  No treatment needed for the asymptomatic onychomycosis of the right hallux nail  Ingrown nail surgery not advise at this time due to the PVD present  Patient to contact me if she desires to schedule arterial Doppler studies  In the interim, dispense a small toe crest to elevate the right 2nd toenail  slightly off the ground  No problem-specific Assessment & Plan notes found for this encounter  Diagnoses and all orders for this visit:    Acquired hammertoe of right foot  -     Ambulatory referral to Podiatry    Onychomycosis of toenail  -     Ambulatory referral to Podiatry    Hammer toe of left foot    Ingrown toenail    Peripheral vascular disease, unspecified ([facility] [address] )          Subjective:      Patient ID: Jimmy Davidson is a 80 y o  female  HPI       Patient, an 49-year-old female presents with a painful right 2nd toenail  This toenail is thickened dystrophic but the chief reason for her pain is the fact that she has a hammertoe deformity of the right 2nd toe  When walking, the toenail is heating the ground that a peculiar angle and leading to her discomfort  A 2nd concern involves intermittent pain involving an ingrown nail affecting the medial nail border of the left great toe  Minimal pain related today  Lastly, the right great toenail is discolored with white patches within the plate      The following portions of the patient's history were reviewed and updated as appropriate: allergies, current medications, past family history, past medical history, past social history, past surgical history and problem list     Review of Systems   Gastrointestinal:         Esophageal reflux   Musculoskeletal: Positive for arthralgias  History of knee pain which required implants  Psychiatric/Behavioral: Negative  Objective:      /78   Pulse 80   Ht 5' 1" (1 549 m)   Wt 79 4 kg (175 lb)   BMI 33 07 kg/m²          Physical Exam  Constitutional:       Appearance: Normal appearance  Cardiovascular:      Comments: No palpable pedal pulses  Musculoskeletal:         General: Deformity present  Comments: Hammertoe deformity 2nd toe bilateral with right symptomatic  Skin:     Comments: Right 2nd toenail is markedly dystrophic  White speck onychomycosis noted right hallux  No pain with palpation left hallux nail but at times the medial nail border is said to be ingrown  Neurological:      General: No focal deficit present  Mental Status: She is oriented to person, place, and time

## 2021-07-08 ENCOUNTER — RA CDI HCC (OUTPATIENT)
Dept: OTHER | Facility: HOSPITAL | Age: 86
End: 2021-07-08

## 2021-07-08 ENCOUNTER — APPOINTMENT (OUTPATIENT)
Dept: LAB | Facility: CLINIC | Age: 86
End: 2021-07-08
Payer: COMMERCIAL

## 2021-07-08 DIAGNOSIS — R73.03 PREDIABETES: ICD-10-CM

## 2021-07-08 DIAGNOSIS — E78.2 MIXED HYPERLIPIDEMIA: ICD-10-CM

## 2021-07-08 DIAGNOSIS — I25.10 CORONARY ARTERY DISEASE INVOLVING NATIVE CORONARY ARTERY OF NATIVE HEART: ICD-10-CM

## 2021-07-08 LAB
ALBUMIN SERPL BCP-MCNC: 3.5 G/DL (ref 3.5–5)
ALP SERPL-CCNC: 89 U/L (ref 46–116)
ALT SERPL W P-5'-P-CCNC: 16 U/L (ref 12–78)
ANION GAP SERPL CALCULATED.3IONS-SCNC: 8 MMOL/L (ref 4–13)
AST SERPL W P-5'-P-CCNC: 19 U/L (ref 5–45)
BILIRUB SERPL-MCNC: 0.84 MG/DL (ref 0.2–1)
BUN SERPL-MCNC: 15 MG/DL (ref 5–25)
CALCIUM SERPL-MCNC: 9.1 MG/DL (ref 8.3–10.1)
CHLORIDE SERPL-SCNC: 108 MMOL/L (ref 100–108)
CHOLEST SERPL-MCNC: 147 MG/DL (ref 50–200)
CO2 SERPL-SCNC: 28 MMOL/L (ref 21–32)
CREAT SERPL-MCNC: 0.78 MG/DL (ref 0.6–1.3)
ERYTHROCYTE [DISTWIDTH] IN BLOOD BY AUTOMATED COUNT: 13.1 % (ref 11.6–15.1)
EST. AVERAGE GLUCOSE BLD GHB EST-MCNC: 105 MG/DL
GFR SERPL CREATININE-BSD FRML MDRD: 70 ML/MIN/1.73SQ M
GLUCOSE P FAST SERPL-MCNC: 94 MG/DL (ref 65–99)
HBA1C MFR BLD: 5.3 %
HCT VFR BLD AUTO: 37.9 % (ref 34.8–46.1)
HDLC SERPL-MCNC: 68 MG/DL
HGB BLD-MCNC: 12.1 G/DL (ref 11.5–15.4)
LDLC SERPL CALC-MCNC: 61 MG/DL (ref 0–100)
MCH RBC QN AUTO: 33 PG (ref 26.8–34.3)
MCHC RBC AUTO-ENTMCNC: 31.9 G/DL (ref 31.4–37.4)
MCV RBC AUTO: 103 FL (ref 82–98)
PLATELET # BLD AUTO: 142 THOUSANDS/UL (ref 149–390)
PMV BLD AUTO: 9.1 FL (ref 8.9–12.7)
POTASSIUM SERPL-SCNC: 3.5 MMOL/L (ref 3.5–5.3)
PROT SERPL-MCNC: 6.8 G/DL (ref 6.4–8.2)
RBC # BLD AUTO: 3.67 MILLION/UL (ref 3.81–5.12)
SODIUM SERPL-SCNC: 144 MMOL/L (ref 136–145)
TRIGL SERPL-MCNC: 91 MG/DL
WBC # BLD AUTO: 5.37 THOUSAND/UL (ref 4.31–10.16)

## 2021-07-08 PROCEDURE — 80053 COMPREHEN METABOLIC PANEL: CPT

## 2021-07-08 PROCEDURE — 80061 LIPID PANEL: CPT

## 2021-07-08 PROCEDURE — 85027 COMPLETE CBC AUTOMATED: CPT

## 2021-07-08 PROCEDURE — 36415 COLL VENOUS BLD VENIPUNCTURE: CPT

## 2021-07-08 PROCEDURE — 83036 HEMOGLOBIN GLYCOSYLATED A1C: CPT

## 2021-07-08 NOTE — PROGRESS NOTES
[facility]  coding opportunities          Chart reviewed, no opportunity found: CHART REVIEWED, NO OPPORTUNITY FOUND                     Patients insurance company:  "[employer] [facility] (Medicare Advantage and Commercial)

## 2021-07-15 ENCOUNTER — OFFICE VISIT (OUTPATIENT)
Dept: INTERNAL MEDICINE CLINIC | Facility: CLINIC | Age: 86
End: 2021-07-15
Payer: COMMERCIAL

## 2021-07-15 VITALS
HEIGHT: 61 IN | SYSTOLIC BLOOD PRESSURE: 120 MMHG | HEART RATE: 82 BPM | OXYGEN SATURATION: 98 % | DIASTOLIC BLOOD PRESSURE: 70 MMHG | WEIGHT: 178.8 LBS | BODY MASS INDEX: 33.76 KG/M2 | TEMPERATURE: 98 F

## 2021-07-15 DIAGNOSIS — K21.9 GASTROESOPHAGEAL REFLUX DISEASE, UNSPECIFIED WHETHER ESOPHAGITIS PRESENT: ICD-10-CM

## 2021-07-15 DIAGNOSIS — M19.011 PRIMARY OSTEOARTHRITIS OF RIGHT SHOULDER: Primary | ICD-10-CM

## 2021-07-15 DIAGNOSIS — M75.01 ADHESIVE CAPSULITIS OF RIGHT SHOULDER: ICD-10-CM

## 2021-07-15 DIAGNOSIS — H61.21 IMPACTED CERUMEN OF RIGHT EAR: ICD-10-CM

## 2021-07-15 DIAGNOSIS — E78.2 MIXED HYPERLIPIDEMIA: ICD-10-CM

## 2021-07-15 PROCEDURE — 99214 OFFICE O/P EST MOD 30 MIN: CPT | Performed by: INTERNAL MEDICINE

## 2021-07-15 NOTE — PATIENT INSTRUCTIONS
Adhesive Capsulitis   WHAT YOU NEED TO KNOW:   Adhesive capsulitis happens when tissues in your shoulder tighten and swell  The condition is often called frozen shoulder because the swollen tissues cause pain and decrease your shoulder movement  DISCHARGE INSTRUCTIONS:   Return to the emergency department if:   · You have new or increased trouble moving your arm  Contact your healthcare provider if:   · You have worse pain and stiffness in your shoulder  · You have questions or concerns about your condition  Medicines:   · Prescription pain medicine  may be given  Ask your healthcare provider how to take this medicine safely  Some prescription pain medicines contain acetaminophen  Do not take other medicines that contain acetaminophen without talking to your healthcare provider  Too much acetaminophen may cause liver damage  Prescription pain medicine may cause constipation  Ask your healthcare provider how to prevent or treat constipation  · NSAIDs  help decrease swelling and pain or fever  This medicine is available with or without a doctor's order  NSAIDs can cause stomach bleeding or kidney problems in certain people  If you take blood thinner medicine, always ask your healthcare provider if NSAIDs are safe for you  Always read the medicine label and follow directions  · Take your medicine as directed  Contact your healthcare provider if you think your medicine is not helping or if you have side effects  Tell him of her if you are allergic to any medicine  Keep a list of the medicines, vitamins, and herbs you take  Include the amounts, and when and why you take them  Bring the list or the pill bottles to follow-up visits  Carry your medicine list with you in case of an emergency  Physical therapy:  A physical therapist teaches you exercises to help improve movement and strength, and to decrease pain     Stretches to do at home:   · Doorway stretch:   a doorway with your painful arm bent at the elbow  Place your hand on the door frame and turn your body away from the door frame  Hold this position for 30 seconds  Relax and repeat  · Forward stretch:  Lie on your back with your legs straight out  Use your healthy arm to push your painful arm up over your head until you feel a gentle stretch  Hold this position for 15 seconds  Slowly lower your arm to the starting position  Relax and repeat  · Crossover stretch:  Use your healthy arm to gently pull your painful arm across your chest just below your chin  Pull until you feel a gentle stretch  Hold this position for 30 seconds  Relax and repeat  Apply ice as directed:  Ice helps decrease pain and swelling  Apply ice to help ease pain after stretching  Use an ice pack, or put crushed ice in a plastic bag  Cover it with a towel before you apply it to your shoulder  Apply ice for 15 to 20 minutes every hour, or as directed  Apply heat as directed:  Heat helps relax muscles and may help improve shoulder movement  Use a heat pack, or soak a small towel in warm water  Wring out the extra water before you apply the towel to your shoulder  Apply heat for 20 to 30 minutes every hour, or as directed  Follow up with your healthcare provider as directed:  Write down your questions so you remember to ask them during your visits  © Copyright [address] Information is for End User's use only and may not be sold, redistributed or otherwise used for commercial purposes  All illustrations and images included in CareNotes® are the copyrighted property of [employer]  or [facility] Kirti Gonzalez   The above information is an  only  It is not intended as medical advice for individual conditions or treatments  Talk to your doctor, nurse or pharmacist before following any medical regimen to see if it is safe and effective for you

## 2021-07-15 NOTE — PROGRESS NOTES
Assessment/Plan:    Esophageal reflux  May increase famotidine at night  Continue pantoprazole in the morning  Elevate head of bed    Adhesive capsulitis of right shoulder  Advised PT  Exercises provided    Hyperlipidemia  Well controlled on simvastatin       Problem List Items Addressed This Visit        Digestive    Esophageal reflux     May increase famotidine at night  Continue pantoprazole in the morning  Elevate head of bed            Musculoskeletal and Integument    Primary osteoarthritis of right shoulder - Primary    Relevant Orders    Ambulatory referral to Physical Therapy    Adhesive capsulitis of right shoulder     Advised PT  Exercises provided         Relevant Orders    Ambulatory referral to Physical Therapy       Other    Hyperlipidemia     Well controlled on simvastatin         Relevant Orders    CBC and differential    Comprehensive metabolic panel    Lipid Panel with Direct LDL reflex      Other Visit Diagnoses     Impacted cerumen of right ear        Relevant Orders    Ear cerumen removal            Subjective:      Patient ID: John Casey is a 80 y o  female  HPI  Here with her daughter  At visit in March, she was c/o low back pain  She did not get the Xray or PT done because her son whom she lives with was diagnosed with prostate cancer and started radiation therapy  She is c/o pain in her right shoulder and limited ROM  She has known OA in this shoulder   Similar problem on the left in the past and she has had arthroplasty   Recent labs reviewed-lipids controlled LDL 61 A1C 5 3 plt count slightly low  Planning to move closer to this area where her daughter lives  C/o heartburn at night after bedtime  She takes pantoprazole in the morning and famotidine at bedtime    The following portions of the patient's history were reviewed and updated as appropriate: allergies, current medications, past family history, past medical history, past social history, past surgical history and problem list     Review of Systems   Constitutional: Negative for chills, fever and unexpected weight change  HENT: Positive for hearing loss (right)  Negative for congestion and rhinorrhea  Respiratory: Negative for cough and shortness of breath  Cardiovascular: Negative for chest pain, palpitations and leg swelling  Gastrointestinal: Negative for constipation and diarrhea  Heartburn   Genitourinary: Negative for difficulty urinating  Musculoskeletal: Positive for arthralgias  Neurological: Negative for dizziness and headaches  Objective:      /70   Pulse 82   Temp 98 °F (36 7 °C)   Ht 5' 1" (1 549 m)   Wt 81 1 kg (178 lb 12 8 oz)   SpO2 98%   BMI 33 78 kg/m²          Physical Exam  Constitutional:       General: She is not in acute distress  Appearance: She is well-developed  She is not ill-appearing, toxic-appearing or diaphoretic  HENT:      Head: Normocephalic and atraumatic  Right Ear: External ear normal  There is impacted cerumen  Left Ear: External ear normal  There is no impacted cerumen  Eyes:      Conjunctiva/sclera: Conjunctivae normal    Cardiovascular:      Rate and Rhythm: Normal rate and regular rhythm  Heart sounds: Normal heart sounds  No murmur heard  Pulmonary:      Effort: Pulmonary effort is normal  No respiratory distress  Breath sounds: Normal breath sounds  No wheezing or rales  Abdominal:      General: There is no distension  Palpations: Abdomen is soft  There is no mass  Tenderness: There is no abdominal tenderness  There is no guarding or rebound  Musculoskeletal:      Cervical back: Neck supple  Right lower leg: No edema  Left lower leg: No edema  Comments: Limited right shoulder ROM in all directions   Skin:     General: Skin is warm and dry  Neurological:      Mental Status: She is alert and oriented to person, place, and time     Psychiatric:         Mood and Affect: Mood normal  Behavior: Behavior normal          Thought Content: Thought content normal          Judgment: Judgment normal          Ear cerumen removal    Date/Time: 7/17/2021 11:10 PM  Performed by: Howard Plunkett MD  Authorized by: Howard Plunkett MD     Patient location:  Clinic  Procedure details:     Location:  R ear  Post-procedure details:     Complication:  None    Hearing quality:  Improved    Patient tolerance of procedure:   Tolerated well, no immediate complications  Comments:      Right ear canal soaked in hydrogen peroxide for a prolonged period which likely dissolved wax that she did not require irrigation

## 2021-07-17 PROBLEM — M75.01 ADHESIVE CAPSULITIS OF RIGHT SHOULDER: Status: ACTIVE | Noted: 2021-07-17

## 2021-09-13 DIAGNOSIS — K21.9 GASTROESOPHAGEAL REFLUX DISEASE: ICD-10-CM

## 2021-09-14 RX ORDER — FAMOTIDINE 20 MG/1
TABLET, FILM COATED ORAL
Qty: 90 TABLET | Refills: 1 | Status: SHIPPED | OUTPATIENT
Start: 2021-09-14 | End: 2022-03-06

## 2021-11-08 ENCOUNTER — APPOINTMENT (OUTPATIENT)
Dept: LAB | Facility: CLINIC | Age: 86
End: 2021-11-08
Payer: COMMERCIAL

## 2021-11-08 ENCOUNTER — RA CDI HCC (OUTPATIENT)
Dept: OTHER | Facility: HOSPITAL | Age: 86
End: 2021-11-08

## 2021-11-08 DIAGNOSIS — E78.2 MIXED HYPERLIPIDEMIA: ICD-10-CM

## 2021-11-08 LAB
ALBUMIN SERPL BCP-MCNC: 3.4 G/DL (ref 3.5–5)
ALP SERPL-CCNC: 88 U/L (ref 46–116)
ALT SERPL W P-5'-P-CCNC: 18 U/L (ref 12–78)
ANION GAP SERPL CALCULATED.3IONS-SCNC: 10 MMOL/L (ref 4–13)
AST SERPL W P-5'-P-CCNC: 28 U/L (ref 5–45)
BASOPHILS # BLD AUTO: 0.05 THOUSANDS/ΜL (ref 0–0.1)
BASOPHILS NFR BLD AUTO: 1 % (ref 0–1)
BILIRUB SERPL-MCNC: 1.15 MG/DL (ref 0.2–1)
BUN SERPL-MCNC: 16 MG/DL (ref 5–25)
CALCIUM ALBUM COR SERPL-MCNC: 9.9 MG/DL (ref 8.3–10.1)
CALCIUM SERPL-MCNC: 9.4 MG/DL (ref 8.3–10.1)
CHLORIDE SERPL-SCNC: 108 MMOL/L (ref 100–108)
CHOLEST SERPL-MCNC: 145 MG/DL (ref 50–200)
CO2 SERPL-SCNC: 25 MMOL/L (ref 21–32)
CREAT SERPL-MCNC: 0.9 MG/DL (ref 0.6–1.3)
EOSINOPHIL # BLD AUTO: 0.1 THOUSAND/ΜL (ref 0–0.61)
EOSINOPHIL NFR BLD AUTO: 2 % (ref 0–6)
ERYTHROCYTE [DISTWIDTH] IN BLOOD BY AUTOMATED COUNT: 13.3 % (ref 11.6–15.1)
GFR SERPL CREATININE-BSD FRML MDRD: 58 ML/MIN/1.73SQ M
GLUCOSE P FAST SERPL-MCNC: 104 MG/DL (ref 65–99)
HCT VFR BLD AUTO: 37.6 % (ref 34.8–46.1)
HDLC SERPL-MCNC: 68 MG/DL
HGB BLD-MCNC: 12.2 G/DL (ref 11.5–15.4)
IMM GRANULOCYTES # BLD AUTO: 0.01 THOUSAND/UL (ref 0–0.2)
IMM GRANULOCYTES NFR BLD AUTO: 0 % (ref 0–2)
LDLC SERPL CALC-MCNC: 65 MG/DL (ref 0–100)
LYMPHOCYTES # BLD AUTO: 2.16 THOUSANDS/ΜL (ref 0.6–4.47)
LYMPHOCYTES NFR BLD AUTO: 44 % (ref 14–44)
MCH RBC QN AUTO: 33.3 PG (ref 26.8–34.3)
MCHC RBC AUTO-ENTMCNC: 32.4 G/DL (ref 31.4–37.4)
MCV RBC AUTO: 103 FL (ref 82–98)
MONOCYTES # BLD AUTO: 0.57 THOUSAND/ΜL (ref 0.17–1.22)
MONOCYTES NFR BLD AUTO: 12 % (ref 4–12)
NEUTROPHILS # BLD AUTO: 2.01 THOUSANDS/ΜL (ref 1.85–7.62)
NEUTS SEG NFR BLD AUTO: 41 % (ref 43–75)
NRBC BLD AUTO-RTO: 0 /100 WBCS
PLATELET # BLD AUTO: 136 THOUSANDS/UL (ref 149–390)
PMV BLD AUTO: 9.1 FL (ref 8.9–12.7)
POTASSIUM SERPL-SCNC: 4.3 MMOL/L (ref 3.5–5.3)
PROT SERPL-MCNC: 6.5 G/DL (ref 6.4–8.2)
RBC # BLD AUTO: 3.66 MILLION/UL (ref 3.81–5.12)
SODIUM SERPL-SCNC: 143 MMOL/L (ref 136–145)
TRIGL SERPL-MCNC: 59 MG/DL
WBC # BLD AUTO: 4.9 THOUSAND/UL (ref 4.31–10.16)

## 2021-11-08 PROCEDURE — 80053 COMPREHEN METABOLIC PANEL: CPT

## 2021-11-08 PROCEDURE — 80061 LIPID PANEL: CPT

## 2021-11-08 PROCEDURE — 36415 COLL VENOUS BLD VENIPUNCTURE: CPT

## 2021-11-08 PROCEDURE — 85025 COMPLETE CBC W/AUTO DIFF WBC: CPT

## 2021-11-10 PROBLEM — D69.6 THROMBOCYTOPENIA (HCC): Status: ACTIVE | Noted: 2021-11-10

## 2021-11-15 ENCOUNTER — OFFICE VISIT (OUTPATIENT)
Dept: INTERNAL MEDICINE CLINIC | Facility: CLINIC | Age: 86
End: 2021-11-15
Payer: COMMERCIAL

## 2021-11-15 VITALS — BODY MASS INDEX: 33.82 KG/M2 | DIASTOLIC BLOOD PRESSURE: 80 MMHG | WEIGHT: 179 LBS | SYSTOLIC BLOOD PRESSURE: 142 MMHG

## 2021-11-15 DIAGNOSIS — E78.2 MIXED HYPERLIPIDEMIA: Primary | ICD-10-CM

## 2021-11-15 DIAGNOSIS — I25.10 CAD IN NATIVE ARTERY: ICD-10-CM

## 2021-11-15 DIAGNOSIS — D69.6 THROMBOCYTOPENIA (HCC): ICD-10-CM

## 2021-11-15 DIAGNOSIS — K21.9 GASTROESOPHAGEAL REFLUX DISEASE, UNSPECIFIED WHETHER ESOPHAGITIS PRESENT: ICD-10-CM

## 2021-11-15 PROCEDURE — 99214 OFFICE O/P EST MOD 30 MIN: CPT | Performed by: INTERNAL MEDICINE

## 2021-11-15 PROCEDURE — 3725F SCREEN DEPRESSION PERFORMED: CPT | Performed by: INTERNAL MEDICINE

## 2021-11-29 ENCOUNTER — OFFICE VISIT (OUTPATIENT)
Dept: CARDIOLOGY CLINIC | Facility: CLINIC | Age: 86
End: 2021-11-29
Payer: COMMERCIAL

## 2021-11-29 VITALS
HEART RATE: 92 BPM | WEIGHT: 179.3 LBS | HEIGHT: 61 IN | SYSTOLIC BLOOD PRESSURE: 100 MMHG | DIASTOLIC BLOOD PRESSURE: 62 MMHG | BODY MASS INDEX: 33.85 KG/M2

## 2021-11-29 DIAGNOSIS — E78.2 MIXED HYPERLIPIDEMIA: ICD-10-CM

## 2021-11-29 DIAGNOSIS — I25.10 CAD IN NATIVE ARTERY: Primary | ICD-10-CM

## 2021-11-29 PROCEDURE — 1036F TOBACCO NON-USER: CPT | Performed by: INTERNAL MEDICINE

## 2021-11-29 PROCEDURE — 93000 ELECTROCARDIOGRAM COMPLETE: CPT | Performed by: INTERNAL MEDICINE

## 2021-11-29 PROCEDURE — 99213 OFFICE O/P EST LOW 20 MIN: CPT | Performed by: INTERNAL MEDICINE

## 2021-11-29 PROCEDURE — 1160F RVW MEDS BY RX/DR IN RCRD: CPT | Performed by: INTERNAL MEDICINE

## 2021-12-05 DIAGNOSIS — K21.9 GASTROESOPHAGEAL REFLUX DISEASE: ICD-10-CM

## 2021-12-05 DIAGNOSIS — E78.2 MIXED HYPERLIPIDEMIA: ICD-10-CM

## 2021-12-05 RX ORDER — NIACIN 1000 MG/1
TABLET, EXTENDED RELEASE ORAL
Qty: 90 TABLET | Refills: 1 | Status: SHIPPED | OUTPATIENT
Start: 2021-12-05 | End: 2022-06-03

## 2021-12-05 RX ORDER — SIMVASTATIN 40 MG
TABLET ORAL
Qty: 90 TABLET | Refills: 1 | Status: SHIPPED | OUTPATIENT
Start: 2021-12-05 | End: 2022-06-03

## 2021-12-05 RX ORDER — PANTOPRAZOLE SODIUM 40 MG/1
TABLET, DELAYED RELEASE ORAL
Qty: 90 TABLET | Refills: 1 | Status: SHIPPED | OUTPATIENT
Start: 2021-12-05 | End: 2022-06-03

## 2022-03-05 DIAGNOSIS — K21.9 GASTROESOPHAGEAL REFLUX DISEASE: ICD-10-CM

## 2022-03-06 RX ORDER — FAMOTIDINE 20 MG/1
TABLET, FILM COATED ORAL
Qty: 90 TABLET | Refills: 1 | Status: SHIPPED | OUTPATIENT
Start: 2022-03-06

## 2022-03-14 ENCOUNTER — TELEPHONE (OUTPATIENT)
Dept: INTERNAL MEDICINE CLINIC | Facility: CLINIC | Age: 87
End: 2022-03-14

## 2022-03-14 ENCOUNTER — HOSPITAL ENCOUNTER (EMERGENCY)
Facility: HOSPITAL | Age: 87
Discharge: HOME | End: 2022-03-14
Attending: EMERGENCY MEDICINE | Admitting: EMERGENCY MEDICINE
Payer: COMMERCIAL

## 2022-03-14 ENCOUNTER — APPOINTMENT (EMERGENCY)
Dept: RADIOLOGY | Facility: HOSPITAL | Age: 87
End: 2022-03-14
Payer: COMMERCIAL

## 2022-03-14 VITALS
RESPIRATION RATE: 16 BRPM | HEART RATE: 68 BPM | TEMPERATURE: 98.2 F | DIASTOLIC BLOOD PRESSURE: 82 MMHG | SYSTOLIC BLOOD PRESSURE: 142 MMHG | OXYGEN SATURATION: 98 %

## 2022-03-14 DIAGNOSIS — J06.9 VIRAL URI WITH COUGH: ICD-10-CM

## 2022-03-14 DIAGNOSIS — R07.89 ATYPICAL CHEST PAIN: Primary | ICD-10-CM

## 2022-03-14 LAB
ALBUMIN SERPL BCP-MCNC: 3.1 G/DL (ref 3.5–5)
ALP SERPL-CCNC: 81 U/L (ref 46–116)
ALT SERPL W P-5'-P-CCNC: 17 U/L (ref 12–78)
ANION GAP SERPL CALCULATED.3IONS-SCNC: 11 MMOL/L (ref 4–13)
APTT PPP: 27 SECONDS (ref 23–37)
AST SERPL W P-5'-P-CCNC: 34 U/L (ref 5–45)
BASOPHILS # BLD AUTO: 0.05 THOUSANDS/ΜL (ref 0–0.1)
BASOPHILS NFR BLD AUTO: 1 % (ref 0–1)
BILIRUB SERPL-MCNC: 0.89 MG/DL (ref 0.2–1)
BUN SERPL-MCNC: 14 MG/DL (ref 5–25)
CALCIUM ALBUM COR SERPL-MCNC: 9.8 MG/DL (ref 8.3–10.1)
CALCIUM SERPL-MCNC: 9.1 MG/DL (ref 8.3–10.1)
CARDIAC TROPONIN I PNL SERPL HS: 4 NG/L
CHLORIDE SERPL-SCNC: 108 MMOL/L (ref 100–108)
CO2 SERPL-SCNC: 23 MMOL/L (ref 21–32)
CREAT SERPL-MCNC: 0.7 MG/DL (ref 0.6–1.3)
EOSINOPHIL # BLD AUTO: 0.08 THOUSAND/ΜL (ref 0–0.61)
EOSINOPHIL NFR BLD AUTO: 2 % (ref 0–6)
ERYTHROCYTE [DISTWIDTH] IN BLOOD BY AUTOMATED COUNT: 13.2 % (ref 11.6–15.1)
GFR SERPL CREATININE-BSD FRML MDRD: 78 ML/MIN/1.73SQ M
GLUCOSE SERPL-MCNC: 100 MG/DL (ref 65–140)
HCT VFR BLD AUTO: 33.6 % (ref 34.8–46.1)
HGB BLD-MCNC: 11.6 G/DL (ref 11.5–15.4)
IMM GRANULOCYTES # BLD AUTO: 0.03 THOUSAND/UL (ref 0–0.2)
IMM GRANULOCYTES NFR BLD AUTO: 1 % (ref 0–2)
INR PPP: 1.15 (ref 0.84–1.19)
LYMPHOCYTES # BLD AUTO: 1.58 THOUSANDS/ΜL (ref 0.6–4.47)
LYMPHOCYTES NFR BLD AUTO: 30 % (ref 14–44)
MCH RBC QN AUTO: 34 PG (ref 26.8–34.3)
MCHC RBC AUTO-ENTMCNC: 34.5 G/DL (ref 31.4–37.4)
MCV RBC AUTO: 99 FL (ref 82–98)
MONOCYTES # BLD AUTO: 0.63 THOUSAND/ΜL (ref 0.17–1.22)
MONOCYTES NFR BLD AUTO: 12 % (ref 4–12)
NEUTROPHILS # BLD AUTO: 2.84 THOUSANDS/ΜL (ref 1.85–7.62)
NEUTS SEG NFR BLD AUTO: 54 % (ref 43–75)
NRBC BLD AUTO-RTO: 0 /100 WBCS
NT-PROBNP SERPL-MCNC: 255 PG/ML
PLATELET # BLD AUTO: 157 THOUSANDS/UL (ref 149–390)
PMV BLD AUTO: 10.2 FL (ref 8.9–12.7)
POTASSIUM SERPL-SCNC: 4.2 MMOL/L (ref 3.5–5.3)
PROT SERPL-MCNC: 6.3 G/DL (ref 6.4–8.2)
PROTHROMBIN TIME: 14.7 SECONDS (ref 11.6–14.5)
RBC # BLD AUTO: 3.41 MILLION/UL (ref 3.81–5.12)
SODIUM SERPL-SCNC: 142 MMOL/L (ref 136–145)
WBC # BLD AUTO: 5.21 THOUSAND/UL (ref 4.31–10.16)

## 2022-03-14 PROCEDURE — 99285 EMERGENCY DEPT VISIT HI MDM: CPT

## 2022-03-14 PROCEDURE — 84484 ASSAY OF TROPONIN QUANT: CPT | Performed by: EMERGENCY MEDICINE

## 2022-03-14 PROCEDURE — 85730 THROMBOPLASTIN TIME PARTIAL: CPT | Performed by: EMERGENCY MEDICINE

## 2022-03-14 PROCEDURE — 36415 COLL VENOUS BLD VENIPUNCTURE: CPT | Performed by: EMERGENCY MEDICINE

## 2022-03-14 PROCEDURE — 85610 PROTHROMBIN TIME: CPT | Performed by: EMERGENCY MEDICINE

## 2022-03-14 PROCEDURE — 85025 COMPLETE CBC W/AUTO DIFF WBC: CPT | Performed by: EMERGENCY MEDICINE

## 2022-03-14 PROCEDURE — 99285 EMERGENCY DEPT VISIT HI MDM: CPT | Performed by: EMERGENCY MEDICINE

## 2022-03-14 PROCEDURE — 83880 ASSAY OF NATRIURETIC PEPTIDE: CPT | Performed by: EMERGENCY MEDICINE

## 2022-03-14 PROCEDURE — 80053 COMPREHEN METABOLIC PANEL: CPT | Performed by: EMERGENCY MEDICINE

## 2022-03-14 PROCEDURE — 93005 ELECTROCARDIOGRAM TRACING: CPT

## 2022-03-14 PROCEDURE — 71046 X-RAY EXAM CHEST 2 VIEWS: CPT

## 2022-03-14 NOTE — TELEPHONE ENCOUNTER
I called the patient to triage her chest pain  She is having shortness of breath, mid back pain and chest tightness for 3 days  I recommended the patient go to the ER for evaluation  She agreed to go to the ER  Her daughter will be taking her to the ER  She will call 911 if the pain intensifies

## 2022-03-14 NOTE — ED PROVIDER NOTES
History  Chief Complaint   Patient presents with    Chest Pain     pt reports chest tightness and sob for a few days also has a cough       History provided by:  Patient and relative  Chest Pain  Pain location:  R lateral chest, L lateral chest, L chest, R chest and substernal area ("all over")  Pain quality: aching    Pain radiates to:  Does not radiate  Pain radiates to the back: no    Pain severity:  Moderate  Duration:  3 days  Timing:  Constant  Progression:  Worsening  Chronicity:  New  Context comment:  Patient's main complaint is cough, says that she gets these coughing fits which caused her chest to hurt, points to her entire chest area of area of discomfort, shortness of breath only with coughing episodes not with exertion, is able to walk around [place]  Relieved by:  None tried  Worsened by:  Coughing  Ineffective treatments:  None tried  Associated symptoms: cough and shortness of breath    Associated symptoms: no abdominal pain, no anxiety, no diaphoresis, no dizziness, no fever, no headache, no nausea, no numbness, no palpitations and not vomiting    Cough:     Cough characteristics:  Dry    Sputum characteristics:  Nondescript    Severity:  Moderate    Onset quality:  Gradual    Duration:  3 days    Timing:  Intermittent    Progression:  Unchanged      Prior to Admission Medications   Prescriptions Last Dose Informant Patient Reported? Taking?    Ascorbic Acid (VITAMIN C) 1000 MG tablet  Self Yes No   Sig: Take 1,000 mg by mouth daily   Cholecalciferol (VITAMIN D3) 1000 units CAPS  Self Yes No   Sig: Take 1 tablet by mouth daily   Multiple Vitamins-Minerals (CENTRUM ADULTS PO)  Self Yes No   Sig: Take 1 tablet by mouth daily   acetaminophen (TYLENOL ARTHRITIS PAIN) 650 mg CR tablet  Self Yes No   Sig: Take by mouth   aspirin 81 MG tablet  Self Yes No   Sig: Take 81 mg by mouth daily   calcium citrate-vitamin D (CITRACAL+D) 315-200 MG-UNIT per tablet  Self Yes No   Sig: Take 1 tablet by mouth 2 (two) times a day   famotidine (PEPCID) 20 mg tablet   No No   Sig: TAKE 1 TABLET(20 MG) BY MOUTH DAILY   niacin (NIASPAN) 1000 MG CR tablet   No No   Sig: TAKE 1 TABLET(1000 MG) BY MOUTH DAILY AT BEDTIME   nitroglycerin (NITROSTAT) 0 4 mg SL tablet  Self No No   Sig: Place 1 tablet (0 4 mg total) under the tongue every 5 (five) minutes as needed for chest pain Up to 3 doses  Call 911 if pain persists   pantoprazole (PROTONIX) 40 mg tablet   No No   Sig: TAKE 1 TABLET(40 MG) BY MOUTH DAILY   simvastatin (ZOCOR) 40 mg tablet   No No   Sig: TAKE 1 TABLET(40 MG) BY MOUTH DAILY      Facility-Administered Medications: None       Past Medical History:   Diagnosis Date    Arthritis     Cardiac disease     Hyperlipidemia     Hypertension     Hypertension, essential, benign 11/6/2018    Preop cardiovascular exam 11/5/2019    Rash 6/3/2020    UTI (urinary tract infection) 5/31/2017       Past Surgical History:   Procedure Laterality Date    APPENDECTOMY      CORONARY ANGIOPLASTY WITH STENT PLACEMENT      CYSTOSCOPY  12/29/2014    Diagnostic    HERNIA REPAIR      JOINT REPLACEMENT      bilat knee, bilat hip, L shoulder    KNEE SURGERY Bilateral     SHOULDER SURGERY  2012    TOTAL HIP ARTHROPLASTY Bilateral        Family History   Problem Relation Age of Onset    Breast cancer Mother 80    Leukemia Mother     Gout Mother     Hypertension Mother     Bone cancer Father 64    Cervical cancer Sister 52    Aneurysm Family     Heart attack Family         myocardial infarction    Rheum arthritis Family     Lung cancer Sister 70    No Known Problems Daughter     No Known Problems Sister     No Known Problems Daughter     No Known Problems Daughter     No Known Problems Son     No Known Problems Maternal Aunt     No Known Problems Maternal Aunt     No Known Problems Maternal Aunt     No Known Problems Paternal Aunt      I have reviewed and agree with the history as documented      E-Cigarette/Vaping E-Cigarette/Vaping Substances     Social History     Tobacco Use    Smoking status: Never Smoker    Smokeless tobacco: Never Used   Substance Use Topics    Alcohol use: No     Comment: social alcohol use, per [employer]    Drug use: No       Review of Systems   Constitutional: Negative for activity change, chills, diaphoresis and fever  HENT: Positive for congestion  Negative for sinus pressure and sore throat  Eyes: Negative for pain and visual disturbance  Respiratory: Positive for cough and shortness of breath  Negative for chest tightness, wheezing and stridor  Cardiovascular: Positive for chest pain  Negative for palpitations  Gastrointestinal: Negative for abdominal distention, abdominal pain, constipation, diarrhea, nausea and vomiting  Genitourinary: Negative for dysuria and frequency  Musculoskeletal: Negative for neck pain and neck stiffness  Skin: Negative for rash  Neurological: Negative for dizziness, speech difficulty, light-headedness, numbness and headaches  Physical Exam  Physical Exam  Vitals reviewed  Constitutional:       General: She is not in acute distress  Appearance: She is well-developed  She is not diaphoretic  Comments: Nontoxic, speaking full clear sentences   HENT:      Head: Normocephalic and atraumatic  Right Ear: External ear normal       Left Ear: External ear normal       Nose: Nose normal    Eyes:      General:         Right eye: No discharge  Left eye: No discharge  Pupils: Pupils are equal, round, and reactive to light  Neck:      Trachea: No tracheal deviation  Cardiovascular:      Rate and Rhythm: Normal rate and regular rhythm  Heart sounds: Normal heart sounds  No murmur heard  Pulmonary:      Effort: Pulmonary effort is normal  No respiratory distress  Breath sounds: Normal breath sounds  No stridor  Abdominal:      General: There is no distension  Palpations: Abdomen is soft  Tenderness: There is no abdominal tenderness  There is no guarding or rebound  Musculoskeletal:         General: Normal range of motion  Cervical back: Normal range of motion and neck supple  Skin:     General: Skin is warm and dry  Coloration: Skin is not pale  Findings: No erythema  Neurological:      General: No focal deficit present  Mental Status: She is alert and oriented to person, place, and time           Vital Signs  ED Triage Vitals [03/14/22 1100]   Temperature Pulse Respirations Blood Pressure SpO2   98 2 °F (36 8 °C) 68 16 119/70 98 %      Temp Source Heart Rate Source Patient Position - Orthostatic VS BP Location FiO2 (%)   Oral Monitor Sitting Left arm --      Pain Score       3           Vitals:    03/14/22 1100 03/14/22 1115 03/14/22 1145   BP: 119/70  166/73   Pulse: 68 78 66   Patient Position - Orthostatic VS: Sitting           Visual Acuity      ED Medications  Medications - No data to display    Diagnostic Studies  Results Reviewed     Procedure Component Value Units Date/Time    CBC and differential [[id number]]  (Abnormal) Collected: 03/14/22 1153    Lab Status: Final result Specimen: Blood from Arm, Left Updated: 03/14/22 1245     WBC 5 21 Thousand/uL      RBC 3 41 Million/uL      Hemoglobin 11 6 g/dL      Hematocrit 33 6 %      MCV 99 fL      MCH 34 0 pg      MCHC 34 5 g/dL      RDW 13 2 %      MPV 10 2 fL      Platelets 652 Thousands/uL      nRBC 0 /100 WBCs      Neutrophils Relative 54 %      Immat GRANS % 1 %      Lymphocytes Relative 30 %      Monocytes Relative 12 %      Eosinophils Relative 2 %      Basophils Relative 1 %      Neutrophils Absolute 2 84 Thousands/µL      Immature Grans Absolute 0 03 Thousand/uL      Lymphocytes Absolute 1 58 Thousands/µL      Monocytes Absolute 0 63 Thousand/µL      Eosinophils Absolute 0 08 Thousand/µL      Basophils Absolute 0 05 Thousands/µL     NT-BNP PRO [[id number]]  (Normal) Collected: 03/14/22 1153    Lab Status: Final result Specimen: Blood from Arm, Left Updated: 03/14/22 1243     NT-proBNP 255 pg/mL     HS Troponin I 4hr [[id number]]     Lab Status: No result Specimen: Blood     HS Troponin 0hr (reflex protocol) [[id number]]  (Normal) Collected: 03/14/22 1153    Lab Status: Final result Specimen: Blood from Arm, Left Updated: 03/14/22 1240     hs TnI 0hr 4 ng/L     HS Troponin I 2hr [[id number]]     Lab Status: No result Specimen: Blood     Comprehensive metabolic panel [[id number]]  (Abnormal) Collected: 03/14/22 1153    Lab Status: Final result Specimen: Blood from Arm, Left Updated: 03/14/22 1235     Sodium 142 mmol/L      Potassium 4 2 mmol/L      Chloride 108 mmol/L      CO2 23 mmol/L      ANION GAP 11 mmol/L      BUN 14 mg/dL      Creatinine 0 70 mg/dL      Glucose 100 mg/dL      Calcium 9 1 mg/dL      Corrected Calcium 9 8 mg/dL      AST 34 U/L      ALT 17 U/L      Alkaline Phosphatase 81 U/L      Total Protein 6 3 g/dL      Albumin 3 1 g/dL      Total Bilirubin 0 89 mg/dL      eGFR 78 ml/min/1 73sq m     Narrative:      [place] guidelines for Chronic Kidney Disease (CKD):     Stage 1 with normal or high GFR (GFR > 90 mL/min/1 73 square meters)    Stage 2 Mild CKD (GFR = 60-89 mL/min/1 73 square meters)    Stage 3A Moderate CKD (GFR = 45-59 mL/min/1 73 square meters)    Stage 3B Moderate CKD (GFR = 30-44 mL/min/1 73 square meters)    Stage 4 Severe CKD (GFR = 15-29 mL/min/1 73 square meters)    Stage 5 End Stage CKD (GFR <15 mL/min/1 73 square meters)  Note: GFR calculation is accurate only with a steady state creatinine    Protime-INR [[id number]]  (Abnormal) Collected: 03/14/22 1153    Lab Status: Final result Specimen: Blood from Arm, Left Updated: 03/14/22 1233     Protime 14 7 seconds      INR 1 15    APTT [[id number]]  (Normal) Collected: 03/14/22 1153    Lab Status: Final result Specimen: Blood from Arm, Left Updated: 03/14/22 1233     PTT 27 seconds                  XR chest 2 views ED Interpretation by Ginette Licona DO (03/14 1219)   No acute pathology      Final Result by Ericka Nuñez MD (03/14 1212)      No acute cardiopulmonary disease  Workstation performed: [id number]                    Procedures  ECG 12 Lead Documentation Only    Date/Time: 3/14/2022 11:46 AM  Performed by: Ginette Licona DO  Authorized by: Ginette Licona DO     ECG reviewed by me, the ED Provider: yes    Patient location:  ED  Previous ECG:     Previous ECG:  Compared to current    Similarity:  No change  Interpretation:     Interpretation: normal    Rate:     ECG rate assessment: normal    Rhythm:     Rhythm: sinus rhythm    Ectopy:     Ectopy: none    QRS:     QRS axis:  Normal    QRS intervals:  Normal  Conduction:     Conduction: normal    ST segments:     ST segments:  Normal  T waves:     T waves: normal               ED Course  ED Course as of 03/14/22 1254   Mon Mar 14, 2022   1253 Long discussion with patient and daughter do not want stay for delta troponins, pain is been consistent for couple days I think it is of lower utility  , will discharge  , they say that they will call Cardiology today to arrange for an outpatient stress test             HEART Risk Score      Most Recent Value   Heart Score Risk Calculator    History 1 Filed at: 03/14/2022 1253   ECG 0 Filed at: 03/14/2022 1253   Age 2 Filed at: 03/14/2022 1253   Risk Factors 1 Filed at: 03/14/2022 1253   Troponin 0 Filed at: 03/14/2022 1253   HEART Score 4 Filed at: 03/14/2022 1253                                      MDM  Number of Diagnoses or Management Options  Atypical chest pain: new and requires workup  Viral URI with cough: new and requires workup  Diagnosis management comments:       Initial ED assessment:  80-year-old female, URI symptoms, congestion, cough, has chest pain but mainly only with coughing, no exertional shortness of breath    Initial DDx includes but is not limited to:   URI, influenza, COVID, pneumonia, less likely ACS    Initial ED plan:   Blood work chest x-ray, disposition pending ED workup  Will hold off on medications as patient says she is allergic to "everything"  Final ED summary/disposition:   After evaluation and workup in the emergency department, workup unremarkable, will discharge will follow with Cardiology        Amount and/or Complexity of Data Reviewed  Clinical lab tests: ordered and reviewed  Tests in the radiology section of CPT®: ordered and reviewed  Decide to obtain previous medical records or to obtain history from someone other than the patient: yes  Obtain history from someone other than the patient: yes  Review and summarize past medical records: yes  Independent visualization of images, tracings, or specimens: yes        Disposition  Final diagnoses:   Atypical chest pain   Viral URI with cough     Time reflects when diagnosis was documented in both MDM as applicable and the Disposition within this note     Time User Action Codes Description Comment    3/14/2022 12:53 PM Hope Tony Add [R07 [id number]] Atypical chest pain     3/14/2022 12:54 PM Hope Tony Add [J06 9] Viral URI with cough       ED Disposition     ED Disposition Condition Date/Time Comment    Discharge Stable Mon Mar 14, 2022 12:53 PM Jimmy Davidson discharge to home/self care  Follow-up Information     Follow up With Specialties Details Why Chance Spann MD Internal Medicine Call in 1 day To arrange for the next available appointment [id number]  [phone]            Patient's Medications   Discharge Prescriptions    No medications on file       No discharge procedures on file      PDMP Review     None          ED Provider  Electronically Signed by           Jd Nicole DO  03/14/22 3950

## 2022-03-15 LAB
ATRIAL RATE: 61 BPM
P AXIS: 37 DEGREES
PR INTERVAL: 186 MS
QRS AXIS: 0 DEGREES
QRSD INTERVAL: 86 MS
QT INTERVAL: 410 MS
QTC INTERVAL: 412 MS
T WAVE AXIS: 10 DEGREES
VENTRICULAR RATE: 61 BPM

## 2022-03-15 PROCEDURE — 93010 ELECTROCARDIOGRAM REPORT: CPT | Performed by: INTERNAL MEDICINE

## 2022-03-15 NOTE — TELEPHONE ENCOUNTER
Racquel Meza (Self) [phone] (M)     Is requesting a call back from the office  She stated she did not receive a call from the office yesterday and I reviewed the information documented on her chart:    Matias Rodriguez    3/14/22 1:35 PM  Note  Pt states she received a missed call from office

## 2022-03-16 ENCOUNTER — OFFICE VISIT (OUTPATIENT)
Dept: INTERNAL MEDICINE CLINIC | Facility: CLINIC | Age: 87
End: 2022-03-16
Payer: COMMERCIAL

## 2022-03-16 VITALS
HEART RATE: 74 BPM | HEIGHT: 61 IN | BODY MASS INDEX: 34.02 KG/M2 | SYSTOLIC BLOOD PRESSURE: 122 MMHG | DIASTOLIC BLOOD PRESSURE: 64 MMHG | OXYGEN SATURATION: 98 % | WEIGHT: 180.2 LBS

## 2022-03-16 DIAGNOSIS — J20.9 ACUTE BRONCHITIS, UNSPECIFIED ORGANISM: Primary | ICD-10-CM

## 2022-03-16 DIAGNOSIS — R07.9 CHEST PAIN, UNSPECIFIED TYPE: ICD-10-CM

## 2022-03-16 PROCEDURE — 99213 OFFICE O/P EST LOW 20 MIN: CPT | Performed by: NURSE PRACTITIONER

## 2022-03-16 PROCEDURE — 1160F RVW MEDS BY RX/DR IN RCRD: CPT | Performed by: NURSE PRACTITIONER

## 2022-03-16 PROCEDURE — 1036F TOBACCO NON-USER: CPT | Performed by: NURSE PRACTITIONER

## 2022-03-16 RX ORDER — BENZONATATE 200 MG/1
200 CAPSULE ORAL 3 TIMES DAILY PRN
Qty: 20 CAPSULE | Refills: 0 | Status: SHIPPED | OUTPATIENT
Start: 2022-03-16 | End: 2022-07-14

## 2022-03-16 RX ORDER — DOXYCYCLINE HYCLATE 100 MG
100 TABLET ORAL 2 TIMES DAILY
Qty: 14 TABLET | Refills: 0 | Status: SHIPPED | OUTPATIENT
Start: 2022-03-16 | End: 2022-03-23

## 2022-03-16 NOTE — PROGRESS NOTES
Assessment/Plan:    Acute bronchitis  Start antibiotics cough medication  Plenty of fluids and rest       Diagnoses and all orders for this visit:    Acute bronchitis, unspecified organism  -     benzonatate (TESSALON) 200 MG capsule; Take 1 capsule (200 mg total) by mouth 3 (three) times a day as needed for cough  -     doxycycline hyclate (VIBRA-TABS) 100 mg tablet; Take 1 tablet (100 mg total) by mouth 2 (two) times a day for 7 days    Chest pain, unspecified type  -     Ambulatory Referral to Cardiology; Future          Subjective:      Patient ID: Leonid Bledsoe is a 80 y o  female  Patient is here for cough since Saturday  Progressively worsening and she coughs with deep breathing and talking  No sore throat, fever, chills  She is not vaccinated for covid19      The following portions of the patient's history were reviewed and updated as appropriate: allergies, current medications, past family history, past medical history, past social history, past surgical history and problem list     Review of Systems   Constitutional: Negative  HENT: Positive for congestion  Eyes: Negative  Respiratory: Positive for cough  Cardiovascular: Negative  Gastrointestinal: Negative  Musculoskeletal: Negative  Neurological: Negative  Objective:      /64   Pulse 74   Ht 5' 1" (1 549 m)   Wt 81 7 kg (180 lb 3 2 oz)   SpO2 98%   BMI 34 05 kg/m²          Physical Exam  Vitals and nursing note reviewed  Constitutional:       Appearance: She is well-developed  HENT:      Head: Normocephalic and atraumatic  Right Ear: External ear normal       Left Ear: External ear normal       Nose: Nose normal    Eyes:      Conjunctiva/sclera: Conjunctivae normal       Pupils: Pupils are equal, round, and reactive to light  Cardiovascular:      Rate and Rhythm: Normal rate and regular rhythm  Pulmonary:      Effort: Pulmonary effort is normal       Breath sounds: Normal breath sounds  Abdominal:      General: Bowel sounds are normal       Palpations: Abdomen is soft  Musculoskeletal:         General: Normal range of motion  Cervical back: Normal range of motion and neck supple  Skin:     General: Skin is warm and dry  Neurological:      Mental Status: She is alert and oriented to person, place, and time

## 2022-03-17 PROBLEM — J20.9 ACUTE BRONCHITIS: Status: ACTIVE | Noted: 2022-03-17

## 2022-04-03 NOTE — PROGRESS NOTES
Cardiology Follow Up    Cyndi Lay  1935  [id number]  [address]  [facility]  [address]   [phone]    1  Shortness of breath  Pulse oximetry overnight    Ambulatory Referral to Sleep Medicine   2  Hyperlipidemia, unspecified hyperlipidemia type  Ambulatory Referral to Cardiology   3  Obesity (BMI 30 0-34  9)         Interval History:   On 3/14/22 Ms Verdis Kocher presented to [place] [address] ED with atypical chest pain  She reported chest tightness with shortness of breath for a few days associated with a cough  NT proBNP 2 5 5  HS troponin 4  EKG NSR without ST T wave abnormality  It was felt she had a URI Sarah Solis was instructed to follow up with Cardiology at discharge  Ms Verdis Kocher presents to our office for a recent ED follow up visit  She admits to a cough  She cannot cough up sputum and is allergic to 21 medications  Sarah Will is accompanied her daughter  Ally lives with her son and will be moving to [place] independent living in the near future  She admits to some lightheadedness and dizziness with going from lying to sitting to walk to Bathroom at night  Sarah Solis denies CP or palpitations  She admits to dyspnea with  Minimal exertion since she had COVID-19 in 2020        Medical History   Reside at [address] [place] [address]   Hyperlipidemia 11/18/21 , TG 59, HDL 68, LDL 65  Obesity   COVID -19 2020     Patient Active Problem List   Diagnosis    Ambulatory dysfunction    H/O right knee surgery    CAD in native artery    History of cardiac catheterization    Hyperlipidemia    Multiple allergies    Bursitis of left hip    Osteoporosis    Esophageal reflux    Class 1 obesity due to excess calories without serious comorbidity with body mass index (BMI) of 32 0 to 32 9 in adult    Ingrowing toenail with infection    Primary osteoarthritis of right shoulder  Chronic midline low back pain without sciatica    Localized edema    Stress incontinence of urine    Adhesive capsulitis of right shoulder    Thrombocytopenia (HCC)    Acute bronchitis     Past Medical History:   Diagnosis Date    Arthritis     Cardiac disease     Hyperlipidemia     Hypertension     Hypertension, essential, benign 11/6/2018    Preop cardiovascular exam 11/5/2019    Rash 6/3/2020    UTI (urinary tract infection) 5/31/2017     Social History     Socioeconomic History    Marital status:      Spouse name: Not on file    Number of children: 3    Years of education: Not on file    Highest education level: Not on file   Occupational History    Occupation: Retired   Tobacco Use    Smoking status: Never Smoker    Smokeless tobacco: Never Used   Vaping Use    Vaping Use: Never used   Substance and Sexual Activity    Alcohol use: No     Comment: social alcohol use, per [employer]    Drug use: No    Sexual activity: Not Currently   Other Topics Concern    Not on file   Social History Narrative    Caffeine use, active    Completed 8th grade    No caffeine use, active, per [employer]     Social Determinants of Health     Financial Resource Strain: Not on file   Food Insecurity: Not on file   Transportation Needs: Not on file   Physical Activity: Not on file   Stress: Not on file   Social Connections: Not on file   Intimate Partner Violence: Not on file   Housing Stability: Not on file      Family History   Problem Relation Age of Onset    Breast cancer Mother 80    Leukemia Mother     Gout Mother     Hypertension Mother     Bone cancer Father 64    Cervical cancer Sister 52    Aneurysm Family     Heart attack Family         myocardial infarction    Rheum arthritis Family     Lung cancer Sister 70    No Known Problems Daughter     No Known Problems Sister     No Known Problems Daughter     No Known Problems Daughter     No Known Problems Son     No Known Problems Maternal Aunt     No Known Problems Maternal Aunt     No Known Problems Maternal Aunt     No Known Problems Paternal Aunt      Past Surgical History:   Procedure Laterality Date    APPENDECTOMY      CORONARY ANGIOPLASTY WITH STENT PLACEMENT      CYSTOSCOPY  12/29/2014    Diagnostic    HERNIA REPAIR      JOINT REPLACEMENT      bilat knee, bilat hip, L shoulder    KNEE SURGERY Bilateral     SHOULDER SURGERY  2012    TOTAL HIP ARTHROPLASTY Bilateral        Current Outpatient Medications:     acetaminophen (TYLENOL ARTHRITIS PAIN) 650 mg CR tablet, Take by mouth, Disp: , Rfl:     Ascorbic Acid (VITAMIN C) 1000 MG tablet, Take 1,000 mg by mouth daily, Disp: , Rfl:     aspirin 81 MG tablet, Take 81 mg by mouth daily, Disp: , Rfl:     benzonatate (TESSALON) 200 MG capsule, Take 1 capsule (200 mg total) by mouth 3 (three) times a day as needed for cough, Disp: 20 capsule, Rfl: 0    calcium citrate-vitamin D (CITRACAL+D) 315-200 MG-UNIT per tablet, Take 1 tablet by mouth 2 (two) times a day, Disp: , Rfl:     Cholecalciferol (VITAMIN D3) 1000 units CAPS, Take 1 tablet by mouth daily, Disp: , Rfl:     famotidine (PEPCID) 20 mg tablet, TAKE 1 TABLET(20 MG) BY MOUTH DAILY, Disp: 90 tablet, Rfl: 1    Multiple Vitamins-Minerals (CENTRUM ADULTS PO), Take 1 tablet by mouth daily, Disp: , Rfl:     niacin (NIASPAN) 1000 MG CR tablet, TAKE 1 TABLET(1000 MG) BY MOUTH DAILY AT BEDTIME, Disp: 90 tablet, Rfl: 1    nitroglycerin (NITROSTAT) 0 4 mg SL tablet, Place 1 tablet (0 4 mg total) under the tongue every 5 (five) minutes as needed for chest pain Up to 3 doses    Call 911 if pain persists (Patient not taking: Reported on 3/16/2022 ), Disp: 25 tablet, Rfl: 0    pantoprazole (PROTONIX) 40 mg tablet, TAKE 1 TABLET(40 MG) BY MOUTH DAILY, Disp: 90 tablet, Rfl: 1    simvastatin (ZOCOR) 40 mg tablet, TAKE 1 TABLET(40 MG) BY MOUTH DAILY, Disp: 90 tablet, Rfl: 1  Allergies   Allergen Reactions    Cephalexin Hives  Ciprofloxacin Hives    Cortisone Headache    Cortizone-10 [Hydrocortisone] Headache    Cyclophosphamide Hives    Dexamethasone Hives    Erythromycin Hives    Influenza Virus Vaccine Hives    Iodinated Casein     Loratadine     Macrobid  [Nitrofurantoin Monohyd Macro] Hives    Motrin [Ibuprofen] Itching    Mucinex Chest Congestion Child [Guaifenesin] Itching    Nitrofurantoin     Omeprazole     Oxycodone-Acetaminophen Other (See Comments)    Penicillins     Percocet  [Oxycodone-Acetaminophen]     Pneumococcal Vac Polyvalent Hives    Povidone Iodine     Sulfa Antibiotics        Labs:  Admission on 03/14/2022, Discharged on 03/14/2022   Component Date Value    WBC 03/14/2022 5 21     RBC 03/14/2022 3 41*    Hemoglobin 03/14/2022 11 6     Hematocrit 03/14/2022 33 6*    MCV 03/14/2022 99*    MCH 03/14/2022 34 0     MCHC 03/14/2022 34 5     RDW 03/14/2022 13 2     MPV 03/14/2022 10 2     Platelets 19/76/7051 157     nRBC 03/14/2022 0     Neutrophils Relative 03/14/2022 54     Immat GRANS % 03/14/2022 1     Lymphocytes Relative 03/14/2022 30     Monocytes Relative 03/14/2022 12     Eosinophils Relative 03/14/2022 2     Basophils Relative 03/14/2022 1     Neutrophils Absolute 03/14/2022 2 84     Immature Grans Absolute 03/14/2022 0 03     Lymphocytes Absolute 03/14/2022 1 58     Monocytes Absolute 03/14/2022 0 63     Eosinophils Absolute 03/14/2022 0 08     Basophils Absolute 03/14/2022 0 05     Sodium 03/14/2022 142     Potassium 03/14/2022 4 2     Chloride 03/14/2022 108     CO2 03/14/2022 23     ANION GAP 03/14/2022 11     BUN 03/14/2022 14     Creatinine 03/14/2022 0 70     Glucose 03/14/2022 100     Calcium 03/14/2022 9 1     Corrected Calcium 03/14/2022 9 8     AST 03/14/2022 34     ALT 03/14/2022 17     Alkaline Phosphatase 03/14/2022 81     Total Protein 03/14/2022 6 3*    Albumin 03/14/2022 3 1*    Total Bilirubin 03/14/2022 0 89     eGFR 03/14/2022 78  Protime 03/14/2022 14 7*    INR 03/14/2022 1 15     PTT 03/14/2022 27     NT-proBNP 03/14/2022 255     hs TnI 0hr 03/14/2022 4     Ventricular Rate 03/14/2022 61     Atrial Rate 03/14/2022 61     IL Interval 03/14/2022 186     QRSD Interval 03/14/2022 86     QT Interval 03/14/2022 410     QTC Interval 03/14/2022 412     P Axis 03/14/2022 37     QRS Axis 03/14/2022 0     T Wave Axis 03/14/2022 10      Imaging: XR chest 2 views    Result Date: 3/14/2022  Narrative: CHEST INDICATION:   Cough, shortness of breath  COMPARISON:  10/30/2020 EXAM PERFORMED/VIEWS:  XR CHEST PA & LATERAL  AP and lateral semierect Images: 2 FINDINGS:  Stable appearing left shoulder prosthesis  Heart shadow appears unremarkable  Atherosclerotic vascular calcifications are noted  The lungs are clear  No pneumothorax or pleural effusion  Age-appropriate degenerative changes are noted in the spine  Impression: No acute cardiopulmonary disease  Workstation performed: [id number]       Review of Systems:  Review of Systems   Constitutional: Positive for fatigue  Respiratory: Positive for shortness of breath  Musculoskeletal: Positive for arthralgias and myalgias  Neurological: Positive for dizziness and light-headedness  All other systems reviewed and are negative  Physical Exam:  Physical Exam  Vitals reviewed  Constitutional:       Appearance: She is obese  HENT:      Head: Normocephalic  Cardiovascular:      Rate and Rhythm: Normal rate and regular rhythm  Pulmonary:      Effort: Pulmonary effort is normal       Breath sounds: Normal breath sounds  Abdominal:      Palpations: Abdomen is soft  Musculoskeletal:         General: Normal range of motion  Right lower leg: No edema  Left lower leg: No edema  Skin:     General: Skin is warm and dry  Capillary Refill: Capillary refill takes less than 2 seconds  Neurological:      General: No focal deficit present        Mental Status: She is alert and oriented to person, place, and time  Psychiatric:         Mood and Affect: Mood normal          Behavior: Behavior normal          Discussion/Summary:  1  Shortness of breath most likely multifactorial due to COVID-19 in 2020, obesity and sedentary lifestyle  2021 Nuclear stress test without perfusion defects  TTE no significant valvular disease  Eu volemic on PE, Encouraged daily exercise and activity  Suggested ambulatory referral to pulmonary for further evaluation  2  Hyperlipidemia 11/18/21 , TG 59, HDL 68, LDL 65 Two simvastatin 40 mg daily heart healthy diet  3  Obesity BMI 34, lightheaded and dizzy with sitting up at night  Daytime fatigue  Overnight pulse oximetry R/O need for supplemental overnight oxygen  Ambulatory referral to sleep medicine R/O sleep apnea

## 2022-04-04 ENCOUNTER — OFFICE VISIT (OUTPATIENT)
Dept: CARDIOLOGY CLINIC | Facility: CLINIC | Age: 87
End: 2022-04-04
Payer: COMMERCIAL

## 2022-04-04 VITALS
SYSTOLIC BLOOD PRESSURE: 130 MMHG | OXYGEN SATURATION: 96 % | WEIGHT: 180 LBS | HEIGHT: 61 IN | BODY MASS INDEX: 33.99 KG/M2 | DIASTOLIC BLOOD PRESSURE: 82 MMHG | HEART RATE: 74 BPM

## 2022-04-04 DIAGNOSIS — R06.02 SHORTNESS OF BREATH: Primary | ICD-10-CM

## 2022-04-04 DIAGNOSIS — E78.5 HYPERLIPIDEMIA, UNSPECIFIED HYPERLIPIDEMIA TYPE: ICD-10-CM

## 2022-04-04 DIAGNOSIS — E66.9 OBESITY (BMI 30.0-34.9): ICD-10-CM

## 2022-04-04 PROCEDURE — 99215 OFFICE O/P EST HI 40 MIN: CPT | Performed by: NURSE PRACTITIONER

## 2022-04-04 PROCEDURE — 1160F RVW MEDS BY RX/DR IN RCRD: CPT | Performed by: NURSE PRACTITIONER

## 2022-04-04 NOTE — PATIENT INSTRUCTIONS
Maintain a 2 gram daily sodium diet and 1500 ml daily fluid restriction  Check daily weights  If you gain 3 pounds in one day, 5 pounds in one week, or experience worsening shortness of breath or increasing lower leg swelling  Please call the heart failure office at [phone]    Please bring a  list of your current medications and daily weights to the office visit    Overnight pulse oximetry  Ambulatory referral to sleep medicine

## 2022-04-26 ENCOUNTER — RA CDI HCC (OUTPATIENT)
Dept: OTHER | Facility: HOSPITAL | Age: 87
End: 2022-04-26

## 2022-04-26 NOTE — PROGRESS NOTES
[facility]  coding opportunities       Chart reviewed, no opportunity found:   [address]        Patients Insurance     Medicare Insurance: [employer]

## 2022-05-05 ENCOUNTER — APPOINTMENT (OUTPATIENT)
Dept: LAB | Facility: CLINIC | Age: 87
End: 2022-05-05
Payer: COMMERCIAL

## 2022-05-05 DIAGNOSIS — I25.10 CAD IN NATIVE ARTERY: ICD-10-CM

## 2022-05-05 DIAGNOSIS — E78.2 MIXED HYPERLIPIDEMIA: ICD-10-CM

## 2022-05-05 DIAGNOSIS — D69.6 THROMBOCYTOPENIA (HCC): ICD-10-CM

## 2022-05-05 LAB
ALBUMIN SERPL BCP-MCNC: 3.6 G/DL (ref 3.5–5)
ALP SERPL-CCNC: 72 U/L (ref 34–104)
ALT SERPL W P-5'-P-CCNC: 12 U/L (ref 7–52)
ANION GAP SERPL CALCULATED.3IONS-SCNC: 8 MMOL/L (ref 4–13)
AST SERPL W P-5'-P-CCNC: 25 U/L (ref 13–39)
BASOPHILS # BLD AUTO: 0.04 THOUSANDS/ΜL (ref 0–0.1)
BASOPHILS NFR BLD AUTO: 1 % (ref 0–1)
BILIRUB SERPL-MCNC: 1.06 MG/DL (ref 0.2–1)
BUN SERPL-MCNC: 14 MG/DL (ref 5–25)
CALCIUM SERPL-MCNC: 9.2 MG/DL (ref 8.4–10.2)
CHLORIDE SERPL-SCNC: 107 MMOL/L (ref 96–108)
CHOLEST SERPL-MCNC: 136 MG/DL
CO2 SERPL-SCNC: 27 MMOL/L (ref 21–32)
CREAT SERPL-MCNC: 0.73 MG/DL (ref 0.6–1.3)
EOSINOPHIL # BLD AUTO: 0.18 THOUSAND/ΜL (ref 0–0.61)
EOSINOPHIL NFR BLD AUTO: 3 % (ref 0–6)
ERYTHROCYTE [DISTWIDTH] IN BLOOD BY AUTOMATED COUNT: 13.2 % (ref 11.6–15.1)
GFR SERPL CREATININE-BSD FRML MDRD: 74 ML/MIN/1.73SQ M
GLUCOSE P FAST SERPL-MCNC: 94 MG/DL (ref 65–99)
HCT VFR BLD AUTO: 37.8 % (ref 34.8–46.1)
HDLC SERPL-MCNC: 51 MG/DL
HGB BLD-MCNC: 12.4 G/DL (ref 11.5–15.4)
IMM GRANULOCYTES # BLD AUTO: 0.01 THOUSAND/UL (ref 0–0.2)
IMM GRANULOCYTES NFR BLD AUTO: 0 % (ref 0–2)
LDLC SERPL CALC-MCNC: 60 MG/DL (ref 0–100)
LYMPHOCYTES # BLD AUTO: 2.25 THOUSANDS/ΜL (ref 0.6–4.47)
LYMPHOCYTES NFR BLD AUTO: 39 % (ref 14–44)
MCH RBC QN AUTO: 33.3 PG (ref 26.8–34.3)
MCHC RBC AUTO-ENTMCNC: 32.8 G/DL (ref 31.4–37.4)
MCV RBC AUTO: 102 FL (ref 82–98)
MONOCYTES # BLD AUTO: 0.56 THOUSAND/ΜL (ref 0.17–1.22)
MONOCYTES NFR BLD AUTO: 10 % (ref 4–12)
NEUTROPHILS # BLD AUTO: 2.78 THOUSANDS/ΜL (ref 1.85–7.62)
NEUTS SEG NFR BLD AUTO: 47 % (ref 43–75)
NRBC BLD AUTO-RTO: 0 /100 WBCS
PLATELET # BLD AUTO: 151 THOUSANDS/UL (ref 149–390)
PMV BLD AUTO: 9.8 FL (ref 8.9–12.7)
POTASSIUM SERPL-SCNC: 3.9 MMOL/L (ref 3.5–5.3)
PROT SERPL-MCNC: 6.3 G/DL (ref 6.4–8.4)
RBC # BLD AUTO: 3.72 MILLION/UL (ref 3.81–5.12)
SODIUM SERPL-SCNC: 142 MMOL/L (ref 135–147)
TRIGL SERPL-MCNC: 126 MG/DL
WBC # BLD AUTO: 5.82 THOUSAND/UL (ref 4.31–10.16)

## 2022-05-05 PROCEDURE — 80061 LIPID PANEL: CPT

## 2022-05-05 PROCEDURE — 80053 COMPREHEN METABOLIC PANEL: CPT

## 2022-05-05 PROCEDURE — 85025 COMPLETE CBC W/AUTO DIFF WBC: CPT

## 2022-05-05 PROCEDURE — 36415 COLL VENOUS BLD VENIPUNCTURE: CPT

## 2022-05-27 ENCOUNTER — TELEPHONE (OUTPATIENT)
Dept: OTHER | Facility: OTHER | Age: 87
End: 2022-05-27

## 2022-05-27 NOTE — TELEPHONE ENCOUNTER
Pt called in stating she has to reschedule her appt for today due to diarrhea  She is requesting a call back

## 2022-06-03 DIAGNOSIS — E78.2 MIXED HYPERLIPIDEMIA: ICD-10-CM

## 2022-06-03 DIAGNOSIS — K21.9 GASTROESOPHAGEAL REFLUX DISEASE: ICD-10-CM

## 2022-06-03 RX ORDER — NIACIN 1000 MG/1
TABLET, EXTENDED RELEASE ORAL
Qty: 90 TABLET | Refills: 1 | Status: SHIPPED | OUTPATIENT
Start: 2022-06-03 | End: 2022-07-14

## 2022-06-03 RX ORDER — PANTOPRAZOLE SODIUM 40 MG/1
TABLET, DELAYED RELEASE ORAL
Qty: 90 TABLET | Refills: 1 | Status: SHIPPED | OUTPATIENT
Start: 2022-06-03 | End: 2022-07-14

## 2022-06-03 RX ORDER — SIMVASTATIN 40 MG
TABLET ORAL
Qty: 90 TABLET | Refills: 1 | Status: SHIPPED | OUTPATIENT
Start: 2022-06-03

## 2022-06-09 ENCOUNTER — TELEPHONE (OUTPATIENT)
Dept: INTERNAL MEDICINE CLINIC | Facility: CLINIC | Age: 87
End: 2022-06-09

## 2022-06-09 NOTE — TELEPHONE ENCOUNTER
Pharmacy is calling regarding the scripts for the pantoprazole and famotidine  Pharmacist asked if patient should be on both medications  They did ask the patient but she was unsure of what she should be taking          Please advise

## 2022-06-23 ENCOUNTER — RA CDI HCC (OUTPATIENT)
Dept: OTHER | Facility: HOSPITAL | Age: 87
End: 2022-06-23

## 2022-06-23 NOTE — PROGRESS NOTES
[id number]  [facility]  coding opportunities          Chart Reviewed number of suggestions sent to Provider: 1     Patients Insurance     Medicare Insurance: [employer]ah[employer]

## 2022-07-14 ENCOUNTER — TELEPHONE (OUTPATIENT)
Dept: CARDIOLOGY CLINIC | Facility: CLINIC | Age: 87
End: 2022-07-14

## 2022-07-14 ENCOUNTER — HOSPITAL ENCOUNTER (EMERGENCY)
Facility: HOSPITAL | Age: 87
Discharge: HOME | End: 2022-07-14
Attending: EMERGENCY MEDICINE
Payer: COMMERCIAL

## 2022-07-14 ENCOUNTER — OFFICE VISIT (OUTPATIENT)
Dept: INTERNAL MEDICINE CLINIC | Facility: CLINIC | Age: 87
End: 2022-07-14
Payer: COMMERCIAL

## 2022-07-14 VITALS
DIASTOLIC BLOOD PRESSURE: 62 MMHG | RESPIRATION RATE: 18 BRPM | HEART RATE: 70 BPM | WEIGHT: 181 LBS | HEIGHT: 61 IN | SYSTOLIC BLOOD PRESSURE: 124 MMHG | OXYGEN SATURATION: 98 % | TEMPERATURE: 97 F | BODY MASS INDEX: 34.17 KG/M2

## 2022-07-14 VITALS
DIASTOLIC BLOOD PRESSURE: 63 MMHG | TEMPERATURE: 97.8 F | SYSTOLIC BLOOD PRESSURE: 165 MMHG | RESPIRATION RATE: 16 BRPM | HEART RATE: 122 BPM | OXYGEN SATURATION: 94 %

## 2022-07-14 DIAGNOSIS — Z00.00 MEDICARE ANNUAL WELLNESS VISIT, SUBSEQUENT: Primary | ICD-10-CM

## 2022-07-14 DIAGNOSIS — I48.91 ATRIAL FIBRILLATION WITH RVR (HCC): ICD-10-CM

## 2022-07-14 DIAGNOSIS — I48.91 NEW ONSET A-FIB (HCC): Primary | ICD-10-CM

## 2022-07-14 DIAGNOSIS — I48.91 ATRIAL FIBRILLATION WITH RAPID VENTRICULAR RESPONSE (HCC): ICD-10-CM

## 2022-07-14 PROBLEM — J20.9 ACUTE BRONCHITIS: Status: RESOLVED | Noted: 2022-03-17 | Resolved: 2022-07-14

## 2022-07-14 LAB
2HR DELTA HS TROPONIN: 3 NG/L
ALBUMIN SERPL BCP-MCNC: 4 G/DL (ref 3.5–5)
ALP SERPL-CCNC: 73 U/L (ref 34–104)
ALT SERPL W P-5'-P-CCNC: 10 U/L (ref 7–52)
ANION GAP SERPL CALCULATED.3IONS-SCNC: 10 MMOL/L (ref 4–13)
ANION GAP SERPL CALCULATED.3IONS-SCNC: 8 MMOL/L (ref 4–13)
APTT PPP: 29 SECONDS (ref 23–37)
AST SERPL W P-5'-P-CCNC: 49 U/L (ref 13–39)
BASOPHILS # BLD AUTO: 0.07 THOUSANDS/ΜL (ref 0–0.1)
BASOPHILS NFR BLD AUTO: 1 % (ref 0–1)
BILIRUB SERPL-MCNC: 0.69 MG/DL (ref 0.2–1)
BUN SERPL-MCNC: 18 MG/DL (ref 5–25)
BUN SERPL-MCNC: 20 MG/DL (ref 5–25)
CALCIUM SERPL-MCNC: 9.4 MG/DL (ref 8.4–10.2)
CALCIUM SERPL-MCNC: 9.8 MG/DL (ref 8.4–10.2)
CARDIAC TROPONIN I PNL SERPL HS: 10 NG/L
CARDIAC TROPONIN I PNL SERPL HS: 7 NG/L
CHLORIDE SERPL-SCNC: 105 MMOL/L (ref 96–108)
CHLORIDE SERPL-SCNC: 107 MMOL/L (ref 96–108)
CO2 SERPL-SCNC: 24 MMOL/L (ref 21–32)
CO2 SERPL-SCNC: 27 MMOL/L (ref 21–32)
CREAT SERPL-MCNC: 0.76 MG/DL (ref 0.6–1.3)
CREAT SERPL-MCNC: 0.78 MG/DL (ref 0.6–1.3)
EOSINOPHIL # BLD AUTO: 0.12 THOUSAND/ΜL (ref 0–0.61)
EOSINOPHIL NFR BLD AUTO: 1 % (ref 0–6)
ERYTHROCYTE [DISTWIDTH] IN BLOOD BY AUTOMATED COUNT: 12.9 % (ref 11.6–15.1)
GFR SERPL CREATININE-BSD FRML MDRD: 69 ML/MIN/1.73SQ M
GFR SERPL CREATININE-BSD FRML MDRD: 71 ML/MIN/1.73SQ M
GLUCOSE SERPL-MCNC: 133 MG/DL (ref 65–140)
GLUCOSE SERPL-MCNC: 89 MG/DL (ref 65–140)
HCT VFR BLD AUTO: 39.7 % (ref 34.8–46.1)
HGB BLD-MCNC: 12.9 G/DL (ref 11.5–15.4)
IMM GRANULOCYTES # BLD AUTO: 0.03 THOUSAND/UL (ref 0–0.2)
IMM GRANULOCYTES NFR BLD AUTO: 0 % (ref 0–2)
INR PPP: 1 (ref 0.84–1.19)
LYMPHOCYTES # BLD AUTO: 3.29 THOUSANDS/ΜL (ref 0.6–4.47)
LYMPHOCYTES NFR BLD AUTO: 38 % (ref 14–44)
MAGNESIUM SERPL-MCNC: 2.4 MG/DL (ref 1.9–2.7)
MCH RBC QN AUTO: 33.1 PG (ref 26.8–34.3)
MCHC RBC AUTO-ENTMCNC: 32.5 G/DL (ref 31.4–37.4)
MCV RBC AUTO: 102 FL (ref 82–98)
MONOCYTES # BLD AUTO: 0.86 THOUSAND/ΜL (ref 0.17–1.22)
MONOCYTES NFR BLD AUTO: 10 % (ref 4–12)
NEUTROPHILS # BLD AUTO: 4.36 THOUSANDS/ΜL (ref 1.85–7.62)
NEUTS SEG NFR BLD AUTO: 50 % (ref 43–75)
NRBC BLD AUTO-RTO: 0 /100 WBCS
PLATELET # BLD AUTO: 197 THOUSANDS/UL (ref 149–390)
PMV BLD AUTO: 10 FL (ref 8.9–12.7)
POTASSIUM SERPL-SCNC: 3.6 MMOL/L (ref 3.5–5.3)
POTASSIUM SERPL-SCNC: 5.6 MMOL/L (ref 3.5–5.3)
PROT SERPL-MCNC: 6.9 G/DL (ref 6.4–8.4)
PROTHROMBIN TIME: 13.2 SECONDS (ref 11.6–14.5)
RBC # BLD AUTO: 3.9 MILLION/UL (ref 3.81–5.12)
SODIUM SERPL-SCNC: 139 MMOL/L (ref 135–147)
SODIUM SERPL-SCNC: 142 MMOL/L (ref 135–147)
TSH SERPL DL<=0.05 MIU/L-ACNC: 2.81 UIU/ML (ref 0.45–4.5)
WBC # BLD AUTO: 8.73 THOUSAND/UL (ref 4.31–10.16)

## 2022-07-14 PROCEDURE — G0439 PPPS, SUBSEQ VISIT: HCPCS | Performed by: INTERNAL MEDICINE

## 2022-07-14 PROCEDURE — 1125F AMNT PAIN NOTED PAIN PRSNT: CPT | Performed by: INTERNAL MEDICINE

## 2022-07-14 PROCEDURE — 93005 ELECTROCARDIOGRAM TRACING: CPT

## 2022-07-14 PROCEDURE — 83735 ASSAY OF MAGNESIUM: CPT

## 2022-07-14 PROCEDURE — 36415 COLL VENOUS BLD VENIPUNCTURE: CPT

## 2022-07-14 PROCEDURE — 96376 TX/PRO/DX INJ SAME DRUG ADON: CPT

## 2022-07-14 PROCEDURE — 93000 ELECTROCARDIOGRAM COMPLETE: CPT | Performed by: INTERNAL MEDICINE

## 2022-07-14 PROCEDURE — 80048 BASIC METABOLIC PNL TOTAL CA: CPT | Performed by: EMERGENCY MEDICINE

## 2022-07-14 PROCEDURE — 3288F FALL RISK ASSESSMENT DOCD: CPT | Performed by: INTERNAL MEDICINE

## 2022-07-14 PROCEDURE — 99215 OFFICE O/P EST HI 40 MIN: CPT | Performed by: INTERNAL MEDICINE

## 2022-07-14 PROCEDURE — 85025 COMPLETE CBC W/AUTO DIFF WBC: CPT

## 2022-07-14 PROCEDURE — 84443 ASSAY THYROID STIM HORMONE: CPT

## 2022-07-14 PROCEDURE — 85730 THROMBOPLASTIN TIME PARTIAL: CPT

## 2022-07-14 PROCEDURE — 80053 COMPREHEN METABOLIC PANEL: CPT

## 2022-07-14 PROCEDURE — 84484 ASSAY OF TROPONIN QUANT: CPT

## 2022-07-14 PROCEDURE — 99285 EMERGENCY DEPT VISIT HI MDM: CPT

## 2022-07-14 PROCEDURE — 85610 PROTHROMBIN TIME: CPT

## 2022-07-14 PROCEDURE — 3725F SCREEN DEPRESSION PERFORMED: CPT | Performed by: INTERNAL MEDICINE

## 2022-07-14 PROCEDURE — 96374 THER/PROPH/DIAG INJ IV PUSH: CPT

## 2022-07-14 PROCEDURE — 1170F FXNL STATUS ASSESSED: CPT | Performed by: INTERNAL MEDICINE

## 2022-07-14 PROCEDURE — 99291 CRITICAL CARE FIRST HOUR: CPT | Performed by: EMERGENCY MEDICINE

## 2022-07-14 RX ORDER — METOPROLOL TARTRATE 5 MG/5ML
5 INJECTION INTRAVENOUS ONCE
Status: COMPLETED | OUTPATIENT
Start: 2022-07-14 | End: 2022-07-14

## 2022-07-14 RX ADMIN — RIVAROXABAN 20 MG: 20 TABLET, FILM COATED ORAL at 18:09

## 2022-07-14 RX ADMIN — METOPROLOL TARTRATE 25 MG: 25 TABLET, FILM COATED ORAL at 20:03

## 2022-07-14 RX ADMIN — METOROPROLOL TARTRATE 5 MG: 5 INJECTION, SOLUTION INTRAVENOUS at 18:09

## 2022-07-14 RX ADMIN — METOROPROLOL TARTRATE 5 MG: 5 INJECTION, SOLUTION INTRAVENOUS at 20:02

## 2022-07-14 NOTE — ED ATTENDING ATTESTATION
7/14/2022  IAkilah MD, saw and evaluated the patient  I have discussed the patient with the resident/non-physician practitioner and agree with the resident's/non-physician practitioner's findings, Plan of Care, and MDM as documented in the resident's/non-physician practitioner's note, except where noted  All available labs and Radiology studies were reviewed  I was present for key portions of any procedure(s) performed by the resident/non-physician practitioner and I was immediately available to provide assistance  At this point I agree with the current assessment done in the Emergency Department  I have conducted an independent evaluation of this patient a history and physical is as follows:    80-year-old female referred from PCP for evaluation after being found to have irregular tachycardia in the office consistent with rapid atrial fibrillation of new onset  Patient reports having some shortness of breath with exertion over the last 2 weeks  Sometimes this is associated with diaphoresis  Denies any chest pain, nausea, vomiting, fever, chills, abdominal pain, palpitations  At rest she currently feels well, asymptomatic  Heart rate 110s to 130s, irregular rhythm while I was in the room  No appreciable murmurs  Breath sounds are clear  No peripheral edema  No thyromegaly  PCP had prescribed Xarelto and metoprolol  Patient did not start yet as she was just seen there today  Will give Xarelto, plan IV metoprolol and will check labs, re-evaluate  May require hospital admission for rate control  ED Course  ED Course as of 07/14/22 2123   Thu Jul 14, 2022 1919 Some heart rate improvement with IV metoprolol  Will re-dose, also give oral metoprolol, re-evaluate  2121 Patient now back in sinus rhythm in the 60s  Remains asymptomatic  Stable for discharge  She will continue oral metoprolol and Xarelto and follow up with PCP and Cardiology           Critical Care Time  CriticalCare Time  Performed by: Taurus Chávez MD  Authorized by: Taurus Chávez MD     Critical care provider statement:     Critical care time (minutes):  30    Critical care time was exclusive of:  Separately billable procedures and treating other patients    Critical care was necessary to treat or prevent imminent or life-threatening deterioration of the following conditions:  Cardiac failure (rapid atrial fibrillation)    Critical care was time spent personally by me on the following activities:  Obtaining history from patient or surrogate, development of treatment plan with patient or surrogate, evaluation of patient's response to treatment, examination of patient, review of old charts, re-evaluation of patient's condition, ordering and review of laboratory studies and ordering and review of radiographic studies

## 2022-07-14 NOTE — ED PROVIDER NOTES
History  Chief Complaint   Patient presents with    Shortness of Breath     PT c/o "SOB that has been going on for a while and was sent in for further eval and for eval of afib"     78-year-old female with history of coronary stent placement, hypertension presents with 3 weeks of shortness of breath  She states the symptoms started gradually and are intermittent  She has never had these symptoms before  Exacerbating factors include activity, while relieving factors include rest   She also endorses palpitations and dizziness with onset of shortness of breath  She denies fever, chest pain, chills, nausea, vomiting, numbness/tingling, diaphoresis  History provided by:  Patient   used: No    Shortness of Breath  Severity:  Moderate  Onset quality:  Gradual  Duration:  3 weeks  Timing:  Intermittent  Progression:  Unchanged  Chronicity:  New  Context: activity    Relieved by:  Rest  Worsened by: Activity  Ineffective treatments:  None tried  Associated symptoms: no abdominal pain, no chest pain, no cough, no diaphoresis, no ear pain, no fever, no rash, no sore throat and no vomiting    Risk factors: no prolonged immobilization and no recent surgery        Prior to Admission Medications   Prescriptions Last Dose Informant Patient Reported? Taking?    Ascorbic Acid (VITAMIN C) 1000 MG tablet  Self Yes No   Sig: Take 1,000 mg by mouth daily   Cholecalciferol (VITAMIN D3) 1000 units CAPS  Self Yes No   Sig: Take 1 tablet by mouth daily   Multiple Vitamins-Minerals (CENTRUM ADULTS PO)  Self Yes No   Sig: Take 1 tablet by mouth daily   acetaminophen (TYLENOL) 650 mg CR tablet  Self Yes No   Sig: Take by mouth   aspirin 81 MG tablet  Self Yes No   Sig: Take 81 mg by mouth daily   calcium citrate-vitamin D (CITRACAL+D) 315-200 MG-UNIT per tablet  Self Yes No   Sig: Take 1 tablet by mouth 2 (two) times a day   famotidine (PEPCID) 20 mg tablet   No No   Sig: TAKE 1 TABLET(20 MG) BY MOUTH DAILY metoprolol tartrate (LOPRESSOR) 25 mg tablet   No No   Sig: Take 1 tablet (25 mg total) by mouth every 12 (twelve) hours   nitroglycerin (NITROSTAT) 0 4 mg SL tablet  Self No No   Sig: Place 1 tablet (0 4 mg total) under the tongue every 5 (five) minutes as needed for chest pain Up to 3 doses    Call 911 if pain persists   Patient not taking: No sig reported   rivaroxaban (Xarelto) 20 mg tablet   No No   Sig: Take 1 tablet (20 mg total) by mouth daily with breakfast   simvastatin (ZOCOR) 40 mg tablet   No No   Sig: TAKE 1 TABLET(40 MG) BY MOUTH DAILY      Facility-Administered Medications: None       Past Medical History:   Diagnosis Date    Acute bronchitis 3/17/2022    Arthritis     Cardiac disease     Hyperlipidemia     Hypertension     Hypertension, essential, benign 11/6/2018    Preop cardiovascular exam 11/5/2019    Rash 6/3/2020    UTI (urinary tract infection) 5/31/2017       Past Surgical History:   Procedure Laterality Date    APPENDECTOMY      CORONARY ANGIOPLASTY WITH STENT PLACEMENT      CYSTOSCOPY  12/29/2014    Diagnostic    HERNIA REPAIR      JOINT REPLACEMENT      bilat knee, bilat hip, L shoulder    KNEE SURGERY Bilateral     SHOULDER SURGERY  2012    TOTAL HIP ARTHROPLASTY Bilateral        Family History   Problem Relation Age of Onset    Breast cancer Mother 80    Leukemia Mother     Gout Mother     Hypertension Mother     Bone cancer Father 64    Cervical cancer Sister 52    Aneurysm Family     Heart attack Family         myocardial infarction    Rheum arthritis Family     Lung cancer Sister 70    No Known Problems Daughter     No Known Problems Sister     No Known Problems Daughter     No Known Problems Daughter     No Known Problems Son     No Known Problems Maternal Aunt     No Known Problems Maternal Aunt     No Known Problems Maternal Aunt     No Known Problems Paternal Aunt      I have reviewed and agree with the history as documented  E-Cigarette/Vaping    E-Cigarette Use Never User      E-Cigarette/Vaping Substances    Nicotine No     THC No     CBD No     Flavoring No     Other No      Social History     Tobacco Use    Smoking status: Never Smoker    Smokeless tobacco: Never Used   Vaping Use    Vaping Use: Never used   Substance Use Topics    Alcohol use: No     Comment: social alcohol use, per [employer]    Drug use: No        Review of Systems   Constitutional: Negative for chills, diaphoresis and fever  HENT: Negative for ear pain and sore throat  Eyes: Negative for pain and visual disturbance  Respiratory: Positive for shortness of breath  Negative for cough  Cardiovascular: Positive for palpitations ( mostly at night)  Negative for chest pain  Gastrointestinal: Negative for abdominal pain and vomiting  Genitourinary: Negative for dysuria and hematuria  Musculoskeletal: Positive for arthralgias (Chronic)  Skin: Negative for color change and rash  Neurological: Positive for light-headedness  Negative for syncope, weakness and numbness  All other systems reviewed and are negative  Physical Exam  ED Triage Vitals [07/14/22 1647]   Temperature Pulse Respirations Blood Pressure SpO2   97 8 °F (36 6 °C) 74 18 (!) 174/92 97 %      Temp Source Heart Rate Source Patient Position - Orthostatic VS BP Location FiO2 (%)   Oral Monitor Sitting Left arm --      Pain Score       --             Orthostatic Vital Signs  Vitals:    07/14/22 1647 07/14/22 1815 07/14/22 1830 07/14/22 1959   BP: (!) 174/92 117/81 141/89 165/63   Pulse: 74 (!) 112 (!) 108 (!) 122   Patient Position - Orthostatic VS: Sitting   Lying       Physical Exam  Vitals and nursing note reviewed  Constitutional:       General: She is not in acute distress  Appearance: She is well-developed  HENT:      Head: Normocephalic and atraumatic     Eyes:      Conjunctiva/sclera: Conjunctivae normal    Cardiovascular:      Rate and Rhythm: Tachycardia present  Rhythm irregular  Pulmonary:      Effort: Pulmonary effort is normal  No respiratory distress  Breath sounds: Normal breath sounds  Abdominal:      Palpations: Abdomen is soft  Tenderness: There is no abdominal tenderness  Musculoskeletal:      Cervical back: Neck supple  Skin:     General: Skin is warm and dry  Neurological:      General: No focal deficit present  Mental Status: She is alert     Psychiatric:         Mood and Affect: Mood normal          Behavior: Behavior normal          ED Medications  Medications   rivaroxaban (XARELTO) tablet 20 mg (20 mg Oral Given 7/14/22 1809)   metoprolol (LOPRESSOR) injection 5 mg (5 mg Intravenous Given 7/14/22 1809)   metoprolol (LOPRESSOR) injection 5 mg (5 mg Intravenous Given 7/14/22 2002)   metoprolol tartrate (LOPRESSOR) tablet 25 mg (25 mg Oral Given 7/14/22 2003)       Diagnostic Studies  Results Reviewed     Procedure Component Value Units Date/Time    Basic metabolic panel [[id number]] Collected: 07/14/22 2007    Lab Status: Final result Specimen: Blood from Arm, Right Updated: 07/14/22 2048     Sodium 142 mmol/L      Potassium 3 6 mmol/L      Chloride 107 mmol/L      CO2 27 mmol/L      ANION GAP 8 mmol/L      BUN 18 mg/dL      Creatinine 0 76 mg/dL      Glucose 133 mg/dL      Calcium 9 4 mg/dL      eGFR 71 ml/min/1 73sq m     Narrative:      [place] guidelines for Chronic Kidney Disease (CKD):     Stage 1 with normal or high GFR (GFR > 90 mL/min/1 73 square meters)    Stage 2 Mild CKD (GFR = 60-89 mL/min/1 73 square meters)    Stage 3A Moderate CKD (GFR = 45-59 mL/min/1 73 square meters)    Stage 3B Moderate CKD (GFR = 30-44 mL/min/1 73 square meters)    Stage 4 Severe CKD (GFR = 15-29 mL/min/1 73 square meters)    Stage 5 End Stage CKD (GFR <15 mL/min/1 73 square meters)  Note: GFR calculation is accurate only with a steady state creatinine    HS Troponin I 2hr [[id number]]  (Normal) Collected: 07/14/22 2007    Lab Status: Final result Specimen: Blood from Arm, Right Updated: 07/14/22 2038     hs TnI 2hr 10 ng/L      Delta 2hr hsTnI 3 ng/L     HS Troponin I 4hr [[id number]]     Lab Status: No result Specimen: Blood     TSH [[id number]]  (Normal) Collected: 07/14/22 1807    Lab Status: Final result Specimen: Blood from Arm, Right Updated: 07/14/22 1913     TSH 3RD GENERATON 2 815 uIU/mL     Narrative:      Patients undergoing fluorescein dye angiography may retain small amounts of fluorescein in the body for 48-72 hours post procedure  Samples containing fluorescein can produce falsely depressed TSH values  If the patient had this procedure,a specimen should be resubmitted post fluorescein clearance        HS Troponin 0hr (reflex protocol) [[id number]]  (Normal) Collected: 07/14/22 1807    Lab Status: Final result Specimen: Blood from Arm, Right Updated: 07/14/22 1844     hs TnI 0hr 7 ng/L     Comprehensive metabolic panel [[id number]]  (Abnormal) Collected: 07/14/22 1807    Lab Status: Final result Specimen: Blood from Arm, Right Updated: 07/14/22 1843     Sodium 139 mmol/L      Potassium 5 6 mmol/L      Chloride 105 mmol/L      CO2 24 mmol/L      ANION GAP 10 mmol/L      BUN 20 mg/dL      Creatinine 0 78 mg/dL      Glucose 89 mg/dL      Calcium 9 8 mg/dL      AST 49 U/L      ALT 10 U/L      Alkaline Phosphatase 73 U/L      Total Protein 6 9 g/dL      Albumin 4 0 g/dL      Total Bilirubin 0 69 mg/dL      eGFR 69 ml/min/1 73sq m     Narrative:      [place] guidelines for Chronic Kidney Disease (CKD):     Stage 1 with normal or high GFR (GFR > 90 mL/min/1 73 square meters)    Stage 2 Mild CKD (GFR = 60-89 mL/min/1 73 square meters)    Stage 3A Moderate CKD (GFR = 45-59 mL/min/1 73 square meters)    Stage 3B Moderate CKD (GFR = 30-44 mL/min/1 73 square meters)    Stage 4 Severe CKD (GFR = 15-29 mL/min/1 73 square meters)    Stage 5 End Stage CKD (GFR <15 mL/min/1 73 square meters)  Note: GFR calculation is accurate only with a steady state creatinine    Magnesium [[id number]]  (Normal) Collected: 07/14/22 1807    Lab Status: Final result Specimen: Blood from Arm, Right Updated: 07/14/22 1843     Magnesium 2 4 mg/dL     Protime-INR [[id number]]  (Normal) Collected: 07/14/22 1807    Lab Status: Final result Specimen: Blood from Arm, Right Updated: 07/14/22 1830     Protime 13 2 seconds      INR 1 00    APTT [[id number]]  (Normal) Collected: 07/14/22 1807    Lab Status: Final result Specimen: Blood from Arm, Right Updated: 07/14/22 1830     PTT 29 seconds     CBC and differential [[id number]]  (Abnormal) Collected: 07/14/22 1807    Lab Status: Final result Specimen: Blood from Arm, Right Updated: 07/14/22 1819     WBC 8 73 Thousand/uL      RBC 3 90 Million/uL      Hemoglobin 12 9 g/dL      Hematocrit 39 7 %       fL      MCH 33 1 pg      MCHC 32 5 g/dL      RDW 12 9 %      MPV 10 0 fL      Platelets 196 Thousands/uL      nRBC 0 /100 WBCs      Neutrophils Relative 50 %      Immat GRANS % 0 %      Lymphocytes Relative 38 %      Monocytes Relative 10 %      Eosinophils Relative 1 %      Basophils Relative 1 %      Neutrophils Absolute 4 36 Thousands/µL      Immature Grans Absolute 0 03 Thousand/uL      Lymphocytes Absolute 3 29 Thousands/µL      Monocytes Absolute 0 86 Thousand/µL      Eosinophils Absolute 0 12 Thousand/µL      Basophils Absolute 0 07 Thousands/µL                  No orders to display         Procedures  ECG 12 Lead Documentation Only    Date/Time: 7/14/2022 5:13 PM  Performed by: Mikael Stahl DO  Authorized by: Mikael Stahl DO     Indications / Diagnosis:  Shortness of breath, palpitations  ECG reviewed by me, the ED Provider: yes    Patient location:  ED  Previous ECG:     Previous ECG:  Compared to current    Comparison ECG info:  AFib has replaced sinus rhythm    Similarity:  Changes noted    Comparison to cardiac monitor: Yes Interpretation:     Interpretation: abnormal    Rate:     ECG rate:  133    ECG rate assessment: tachycardic    Rhythm:     Rhythm: atrial fibrillation    QRS:     QRS axis:  Normal  ST segments:     ST segments:  Non-specific    Depression:  II, III and aVF  T waves:     T waves: inverted      Inverted:  AVL and I          ED Course  ED Course as of 07/14/22 2145   Thu Jul 14, 2022   1752 POCT ECG(!)  AFib and RVR, replaced sinus rhythm from previous EKG   1752 Blood Pressure(!): 174/92   1752 Tachycardia in the 150s   1850 CBC and differential(!)  Wnl       [id number] SCORE    Flowsheet Row Most Recent Value   [id number]    Age 2 Filed at: 07/14/2022 1713   Sex 1 Filed at: 07/14/2022 1713   CHF History --   HTN History --   Stroke or TIA Symptoms Previously --   Vascular Disease History --   Diabetes History --   [id number] Score 3 Filed at: 07/14/2022 1713                                      MDM  Number of Diagnoses or Management Options  Diagnosis management comments: DDX including but not limited to: A-fib, pneumonia, pleural effusion, CHF, PE, PTX, ACS, MI, COVID 19, anemia, metabolic abnormality, renal failure         Amount and/or Complexity of Data Reviewed  Clinical lab tests: ordered and reviewed  Tests in the medicine section of CPT®: ordered and reviewed  Decide to obtain previous medical records or to obtain history from someone other than the patient: yes  Obtain history from someone other than the patient: yes  Review and summarize past medical records: yes  Independent visualization of images, tracings, or specimens: yes    Risk of Complications, Morbidity, and/or Mortality  Presenting problems: high  Diagnostic procedures: moderate  Management options: moderate        Disposition  Final diagnoses:   New onset a-fib ([facility] )   [facility]     Time reflects when diagnosis was documented in both MDM as applicable and the Disposition within this note     Time User Action Codes Description Comment    7/14/2022  9:23 PM Lucinda Dickson Add [I48 91] New onset a-fib [facility])     7/14/2022  9:23 PM Lucinda Dickson Add [I48 91] Atrial fibrillation with RVR [facility])       ED Disposition     ED Disposition   Discharge    Condition   Stable    Date/Time   Thu Jul 14, 2022  9:23 PM    Comment   Gilbert San discharge to home/self care  Follow-up Information     Follow up With Specialties Details Why Enrique Baker MD Internal Medicine  As needed, If symptoms worsen [id number]  [phone]            Discharge Medication List as of 7/14/2022  9:24 PM      CONTINUE these medications which have NOT CHANGED    Details   acetaminophen (TYLENOL) 650 mg CR tablet Take by mouth, Historical Med      Ascorbic Acid (VITAMIN C) 1000 MG tablet Take 1,000 mg by mouth daily, Historical Med      aspirin 81 MG tablet Take 81 mg by mouth daily, Historical Med      calcium citrate-vitamin D (CITRACAL+D) 315-200 MG-UNIT per tablet Take 1 tablet by mouth 2 (two) times a day, Historical Med      Cholecalciferol (VITAMIN D3) 1000 units CAPS Take 1 tablet by mouth daily, Historical Med      famotidine (PEPCID) 20 mg tablet TAKE 1 TABLET(20 MG) BY MOUTH DAILY, Normal      metoprolol tartrate (LOPRESSOR) 25 mg tablet Take 1 tablet (25 mg total) by mouth every 12 (twelve) hours, Starting Thu 7/14/2022, Normal      Multiple Vitamins-Minerals (CENTRUM ADULTS PO) Take 1 tablet by mouth daily, Historical Med      nitroglycerin (NITROSTAT) 0 4 mg SL tablet Place 1 tablet (0 4 mg total) under the tongue every 5 (five) minutes as needed for chest pain Up to 3 doses    Call 911 if pain persists, Starting Thu 3/11/2021, Normal      rivaroxaban (Xarelto) 20 mg tablet Take 1 tablet (20 mg total) by mouth daily with breakfast, Starting Thu 7/14/2022, Normal      simvastatin (ZOCOR) 40 mg tablet TAKE 1 TABLET(40 MG) BY MOUTH DAILY, Normal           No discharge procedures on file     PDMP Review     None           ED Provider  Attending physically available and evaluated Carmen Wheatley I managed the patient along with the ED Attending      Electronically Signed by         Eliu Lopez DO  07/14/22 9344

## 2022-07-14 NOTE — PATIENT INSTRUCTIONS
Medicare Preventive Visit Patient Instructions  Thank you for completing your Welcome to Medicare Visit or Medicare Annual Wellness Visit today  Your next wellness visit will be due in one year (7/15/2023)  The screening/preventive services that you may require over the next 5-10 years are detailed below  Some tests may not apply to you based off risk factors and/or age  Screening tests ordered at today's visit but not completed yet may show as past due  Also, please note that scanned in results may not display below  Preventive Screenings:  Service Recommendations Previous Testing/Comments   Colorectal Cancer Screening  * Colonoscopy    * Fecal Occult Blood Test (FOBT)/Fecal Immunochemical Test (FIT)  * Fecal DNA/Cologuard Test  * Flexible Sigmoidoscopy Age: 54-65 years old   Colonoscopy: every 10 years (may be performed more frequently if at higher risk)  OR  FOBT/FIT: every 1 year  OR  Cologuard: every 3 years  OR  Sigmoidoscopy: every 5 years  Screening may be recommended earlier than age 48 if at higher risk for colorectal cancer  Also, an individualized decision between you and your healthcare provider will decide whether screening between the ages of 74-80 would be appropriate  Colonoscopy: Not on file  FOBT/FIT: Not on file  Cologuard: Not on file  Sigmoidoscopy: Not on file          Breast Cancer Screening Age: 36 years old  Frequency: every 1-2 years  Not required if history of left and right mastectomy Mammogram: 08/18/2020        Cervical Cancer Screening Between the ages of 21-29, pap smear recommended once every 3 years  Between the ages of 33-67, can perform pap smear with HPV co-testing every 5 years     Recommendations may differ for women with a history of total hysterectomy, cervical cancer, or abnormal pap smears in past  Pap Smear: Not on file        Hepatitis C Screening Once for adults born between [facility]  More frequently in patients at high risk for Hepatitis C Hep C Antibody: Not on file        Diabetes Screening 1-2 times per year if you're at risk for diabetes or have pre-diabetes Fasting glucose: 94 mg/dL   A1C: 5 3 %        Cholesterol Screening Once every 5 years if you don't have a lipid disorder  May order more often based on risk factors  Lipid panel: 05/05/2022          Other Preventive Screenings Covered by Medicare:  1  Abdominal Aortic Aneurysm (AAA) Screening: covered once if your at risk  You're considered to be at risk if you have a family history of AAA  2  Lung Cancer Screening: covers low dose CT scan once per year if you meet all of the following conditions: (1) Age 50-69; (2) No signs or symptoms of lung cancer; (3) Current smoker or have quit smoking within the last 15 years; (4) You have a tobacco smoking history of at least 30 pack years (packs per day multiplied by number of years you smoked); (5) You get a written order from a healthcare provider  3  Glaucoma Screening: covered annually if you're considered high risk: (1) You have diabetes OR (2) Family history of glaucoma OR (3)  aged 48 and older OR (3)  American aged 72 and older  3  Osteoporosis Screening: covered every 2 years if you meet one of the following conditions: (1) You're estrogen deficient and at risk for osteoporosis based off medical history and other findings; (2) Have a vertebral abnormality; (3) On glucocorticoid therapy for more than 3 months; (4) Have primary hyperparathyroidism; (5) On osteoporosis medications and need to assess response to drug therapy  · Last bone density test (DXA Scan): 04/08/2016   5  HIV Screening: covered annually if you're between the age of 15-65  Also covered annually if you are younger than 13 and older than 72 with risk factors for HIV infection  For pregnant patients, it is covered up to 3 times per pregnancy      Immunizations:  Immunization Recommendations   Influenza Vaccine Annual influenza vaccination during flu season is recommended for all persons aged >= 6 months who do not have contraindications   Pneumococcal Vaccine (Prevnar and Pneumovax)  * Prevnar = PCV13  * Pneumovax = PPSV23   Adults 25-60 years old: 1-3 doses may be recommended based on certain risk factors  Adults 72 years old: Prevnar (PCV13) vaccine recommended followed by Pneumovax (PPSV23) vaccine  If already received PPSV23 since turning 65, then PCV13 recommended at least one year after PPSV23 dose  Hepatitis B Vaccine 3 dose series if at intermediate or high risk (ex: diabetes, end stage renal disease, liver disease)   Tetanus (Td) Vaccine - COST NOT COVERED BY MEDICARE PART B Following completion of primary series, a booster dose should be given every 10 years to maintain immunity against tetanus  Td may also be given as tetanus wound prophylaxis  Tdap Vaccine - COST NOT COVERED BY MEDICARE PART B Recommended at least once for all adults  For pregnant patients, recommended with each pregnancy  Shingles Vaccine (Shingrix) - COST NOT COVERED BY MEDICARE PART B  2 shot series recommended in those aged 48 and above     Health Maintenance Due:  There are no preventive care reminders to display for this patient  Immunizations Due:      Topic Date Due    COVID-19 Vaccine (1) Never done    Pneumococcal Vaccine: 65+ Years (1 - PCV) Never done    Influenza Vaccine (1) 09/01/2022     Advance Directives   What are advance directives? Advance directives are legal documents that state your wishes and plans for medical care  These plans are made ahead of time in case you lose your ability to make decisions for yourself  Advance directives can apply to any medical decision, such as the treatments you want, and if you want to donate organs  What are the types of advance directives? There are many types of advance directives, and each state has rules about how to use them  You may choose a combination of any of the following:  · Living will:   This is a written record of the treatment you want  You can also choose which treatments you do not want, which to limit, and which to stop at a certain time  This includes surgery, medicine, IV fluid, and tube feedings  · Durable power of  for healthcare [facility]): This is a written record that states who you want to make healthcare choices for you when you are unable to make them for yourself  This person, called a proxy, is usually a family member or a friend  You may choose more than 1 proxy  · Do not resuscitate (DNR) order:  A DNR order is used in case your heart stops beating or you stop breathing  It is a request not to have certain forms of treatment, such as CPR  A DNR order may be included in other types of advance directives  · Medical directive: This covers the care that you want if you are in a coma, near death, or unable to make decisions for yourself  You can list the treatments you want for each condition  Treatment may include pain medicine, surgery, blood transfusions, dialysis, IV or tube feedings, and a ventilator (breathing machine)  · Values history: This document has questions about your views, beliefs, and how you feel and think about life  This information can help others choose the care that you would choose  Why are advance directives important? An advance directive helps you control your care  Although spoken wishes may be used, it is better to have your wishes written down  Spoken wishes can be misunderstood, or not followed  Treatments may be given even if you do not want them  An advance directive may make it easier for your family to make difficult choices about your care  Urinary Incontinence   Urinary incontinence (UI)  is when you lose control of your bladder  UI develops because your bladder cannot store or empty urine properly  The 3 most common types of UI are stress incontinence, urge incontinence, or both  Medicines:   · May be given to help strengthen your bladder control  Report any side effects of medication to your healthcare provider  Do pelvic muscle exercises often:  Your pelvic muscles help you stop urinating  Squeeze these muscles tight for 5 seconds, then relax for 5 seconds  Gradually work up to squeezing for 10 seconds  Do 3 sets of 15 repetitions a day, or as directed  This will help strengthen your pelvic muscles and improve bladder control  Train your bladder:  Go to the bathroom at set times, such as every 2 hours, even if you do not feel the urge to go  You can also try to hold your urine when you feel the urge to go  For example, hold your urine for 5 minutes when you feel the urge to go  As that becomes easier, hold your urine for 10 minutes  Self-care:   · Keep a UI record  Write down how often you leak urine and how much you leak  Make a note of what you were doing when you leaked urine  · Drink liquids as directed  You may need to limit the amount of liquid you drink to help control your urine leakage  Do not drink any liquid right before you go to bed  Limit or do not have drinks that contain caffeine or alcohol  · Prevent constipation  Eat a variety of high-fiber foods  Good examples are high-fiber cereals, beans, vegetables, and whole-grain breads  Walking is the best way to trigger your intestines to have a bowel movement  · Exercise regularly and maintain a healthy weight  Weight loss and exercise will decrease pressure on your bladder and help you control your leakage  · Use a catheter as directed  to help empty your bladder  A catheter is a tiny, plastic tube that is put into your bladder to drain your urine  · Go to behavior therapy as directed  Behavior therapy may be used to help you learn to control your urge to urinate  Weight Management   Why it is important to manage your weight:  Being overweight increases your risk of health conditions such as heart disease, high blood pressure, type 2 diabetes, and certain types of cancer   It can also increase your risk for osteoarthritis, sleep apnea, and other respiratory problems  Aim for a slow, steady weight loss  Even a small amount of weight loss can lower your risk of health problems  How to lose weight safely:  A safe and healthy way to lose weight is to eat fewer calories and get regular exercise  You can lose up about 1 pound a week by decreasing the number of calories you eat by 500 calories each day  Healthy meal plan for weight management:  A healthy meal plan includes a variety of foods, contains fewer calories, and helps you stay healthy  A healthy meal plan includes the following:  · Eat whole-grain foods more often  A healthy meal plan should contain fiber  Fiber is the part of grains, fruits, and vegetables that is not broken down by your body  Whole-grain foods are healthy and provide extra fiber in your diet  Some examples of whole-grain foods are whole-wheat breads and pastas, oatmeal, brown rice, and bulgur  · Eat a variety of vegetables every day  Include dark, leafy greens such as spinach, kale, ernesto greens, and mustard greens  Eat yellow and orange vegetables such as carrots, sweet potatoes, and winter squash  · Eat a variety of fruits every day  Choose fresh or canned fruit (canned in its own juice or light syrup) instead of juice  Fruit juice has very little or no fiber  · Eat low-fat dairy foods  Drink fat-free (skim) milk or 1% milk  Eat fat-free yogurt and low-fat cottage cheese  Try low-fat cheeses such as mozzarella and other reduced-fat cheeses  · Choose meat and other protein foods that are low in fat  Choose beans or other legumes such as split peas or lentils  Choose fish, skinless poultry (chicken or turkey), or lean cuts of red meat (beef or pork)  Before you cook meat or poultry, cut off any visible fat  · Use less fat and oil  Try baking foods instead of frying them   Add less fat, such as margarine, sour cream, regular salad dressing and mayonnaise to foods  Eat fewer high-fat foods  Some examples of high-fat foods include french fries, doughnuts, ice cream, and cakes  · Eat fewer sweets  Limit foods and drinks that are high in sugar  This includes candy, cookies, regular soda, and sweetened drinks  Exercise:  Exercise at least 30 minutes per day on most days of the week  Some examples of exercise include walking, biking, dancing, and swimming  You can also fit in more physical activity by taking the stairs instead of the elevator or parking farther away from stores  Ask your healthcare provider about the best exercise plan for you  © Copyright [employer] 2018 Information is for End User's use only and may not be sold, redistributed or otherwise used for commercial purposes   All illustrations and images included in CareNotes® are the copyrighted property of A D A M [employer]  or [address] [place] [address]

## 2022-07-14 NOTE — ASSESSMENT & PLAN NOTE
Patient was in office for routine follow up and noted to be tachycardic, HR irregular  Afib on EKG  She has been experiencing SOB with exertion for a while now but currently denies SOB  She denies chest pain  She feels her heart beating fast when she lays on her left side  She had LE edema when she was out camping 2 weeks ago which resolved with raising the LE  She does not have edema at this time  I was going to start her on Xarelto and metoprolol and set her up with cardiology ASAP  Cardiology recommended she go to the ER if she needs to be seen urgently  She agreed to go to the ER with her 2 daughters  Xarelto samples were dispensed today

## 2022-07-14 NOTE — PROGRESS NOTES
Assessment and Plan:     Problem List Items Addressed This Visit        Cardiovascular and Mediastinum    Atrial fibrillation with rapid ventricular response ([facility] [address] )     Patient was in office for routine follow up and noted to be tachycardic, HR irregular  Afib on EKG  She has been experiencing SOB with exertion for a while now but currently denies SOB  She denies chest pain  She feels her heart beating fast when she lays on her left side  She had LE edema when she was out camping 2 weeks ago which resolved with raising the LE  She does not have edema at this time  I was going to start her on Xarelto and metoprolol and set her up with cardiology ASAP  Cardiology recommended she go to the ER if she needs to be seen urgently  She agreed to go to the ER with her 2 daughters  Xarelto samples were dispensed today  Relevant Medications    metoprolol tartrate (LOPRESSOR) 25 mg tablet    rivaroxaban (Xarelto) 20 mg tablet    Other Relevant Orders    POCT ECG (Completed)    Transfer to other facility (Completed)      Other Visit Diagnoses     Medicare annual wellness visit, subsequent    -  Primary        BMI Counseling: Body mass index is 34 2 kg/m²  Follow-up plan was not completed due to patient being in urgent or emergent medical situation  Depression Screening and Follow-up Plan: Patient was screened for depression during today's encounter  They screened negative with a PHQ-2 score of 0  Preventive health issues were discussed with patient, and age appropriate screening tests were ordered as noted in patient's After Visit Summary  Personalized health advice and appropriate referrals for health education or preventive services given if needed, as noted in patient's After Visit Summary  History of Present Illness:     Patient presents for a Medicare Wellness Visit    Here with her 2 daughters for a follow up  Her daughter reports severe coughing fits when they were out camping 2 weeks ago   This was accompanied by chest tightness and wheezing  No asthma history  They suspect it is from allergies  She also had LE edema that resolved with leg elevation  She gets SOB with exertion and stops to rest more  She denies SOB       Patient Care Team:  Joshua Arias MD as PCP - General Kenya Ames, MD Silviano Askew MD Esperanza Dew, MD Earvin Ensign Od (Optometry)     Review of Systems:     Review of Systems   Constitutional: Negative for chills and fever  HENT: Negative for congestion  Respiratory: Positive for cough, shortness of breath and wheezing  Cardiovascular: Positive for leg swelling  Negative for chest pain and palpitations  Gastrointestinal: Negative for abdominal pain, constipation and diarrhea  Endocrine: Positive for heat intolerance  Neurological: Positive for dizziness (episode on 6/6 getting out of bed)  Negative for headaches          Problem List:     Patient Active Problem List   Diagnosis    Ambulatory dysfunction    H/O right knee surgery    CAD in native artery    History of cardiac catheterization    Hyperlipidemia    Multiple allergies    Bursitis of left hip    Osteoporosis    Esophageal reflux    Class 1 obesity due to excess calories without serious comorbidity with body mass index (BMI) of 32 0 to 32 9 in adult    Ingrowing toenail with infection    Primary osteoarthritis of right shoulder    Chronic midline low back pain without sciatica    Localized edema    Stress incontinence of urine    Adhesive capsulitis of right shoulder    Thrombocytopenia (HCC)    Atrial fibrillation with rapid ventricular response [facility])      Past Medical and Surgical History:     Past Medical History:   Diagnosis Date    Acute bronchitis 3/17/2022    Arthritis     Cardiac disease     Hyperlipidemia     Hypertension     Hypertension, essential, benign 11/6/2018    Preop cardiovascular exam 11/5/2019    Rash 6/3/2020    UTI (urinary tract infection) 5/31/2017     Past Surgical History:   Procedure Laterality Date    APPENDECTOMY      CORONARY ANGIOPLASTY WITH STENT PLACEMENT      CYSTOSCOPY  12/29/2014    Diagnostic    HERNIA REPAIR      JOINT REPLACEMENT      bilat knee, bilat hip, L shoulder    KNEE SURGERY Bilateral     SHOULDER SURGERY  2012    TOTAL HIP ARTHROPLASTY Bilateral       Family History:     Family History   Problem Relation Age of Onset    Breast cancer Mother 80    Leukemia Mother     Gout Mother     Hypertension Mother     Bone cancer Father 64    Cervical cancer Sister 52    Aneurysm Family     Heart attack Family         myocardial infarction    Rheum arthritis Family     Lung cancer Sister 70    No Known Problems Daughter     No Known Problems Sister     No Known Problems Daughter     No Known Problems Daughter     No Known Problems Son     No Known Problems Maternal Aunt     No Known Problems Maternal Aunt     No Known Problems Maternal Aunt     No Known Problems Paternal Aunt       Social History:     Social History     Socioeconomic History    Marital status:      Spouse name: None    Number of children: 4    Years of education: None    Highest education level: None   Occupational History    Occupation: Retired   Tobacco Use    Smoking status: Never Smoker    Smokeless tobacco: Never Used   Vaping Use    Vaping Use: Never used   Substance and Sexual Activity    Alcohol use: No     Comment: social alcohol use, per [employer]    Drug use: No    Sexual activity: Not Currently   Other Topics Concern    None   Social History Narrative    Caffeine use, active    Completed 8th grade    No caffeine use, active, per [employer]     Social Determinants of Health     Financial Resource Strain: Not on file   Food Insecurity: Not on file   Transportation Needs: Not on file   Physical Activity: Not on file   Stress: Not on file   Social Connections: Not on file   Intimate Partner Violence: Not on file   Housing Stability: Not on file      Medications and Allergies:     Current Outpatient Medications   Medication Sig Dispense Refill    acetaminophen (TYLENOL) 650 mg CR tablet Take by mouth      Ascorbic Acid (VITAMIN C) 1000 MG tablet Take 1,000 mg by mouth daily      aspirin 81 MG tablet Take 81 mg by mouth daily      calcium citrate-vitamin D (CITRACAL+D) 315-200 MG-UNIT per tablet Take 1 tablet by mouth 2 (two) times a day      Cholecalciferol (VITAMIN D3) 1000 units CAPS Take 1 tablet by mouth daily      famotidine (PEPCID) 20 mg tablet TAKE 1 TABLET(20 MG) BY MOUTH DAILY 90 tablet 1    metoprolol tartrate (LOPRESSOR) 25 mg tablet Take 1 tablet (25 mg total) by mouth every 12 (twelve) hours 60 tablet 2    Multiple Vitamins-Minerals (CENTRUM ADULTS PO) Take 1 tablet by mouth daily      rivaroxaban (Xarelto) 20 mg tablet Take 1 tablet (20 mg total) by mouth daily with breakfast 30 tablet 2    simvastatin (ZOCOR) 40 mg tablet TAKE 1 TABLET(40 MG) BY MOUTH DAILY 90 tablet 1    nitroglycerin (NITROSTAT) 0 4 mg SL tablet Place 1 tablet (0 4 mg total) under the tongue every 5 (five) minutes as needed for chest pain Up to 3 doses  Call 911 if pain persists (Patient not taking: No sig reported) 25 tablet 0     No current facility-administered medications for this visit       Allergies   Allergen Reactions    Cephalexin Hives    Ciprofloxacin Hives    Cortisone Headache    Cortizone-10 [Hydrocortisone] Headache    Cyclophosphamide Hives    Dexamethasone Hives    Erythromycin Hives    Hemophilus B Polysaccharide Vaccine Hives    Influenza Virus Vaccine Hives    Iodinated Casein     Loratadine     Macrobid  [Nitrofurantoin Monohyd Macro] Hives    Motrin [Ibuprofen] Itching    Mucinex Chest Congestion Child [Guaifenesin] Itching    Niacin Itching    Nitrofurantoin     Omeprazole     Oxycodone-Acetaminophen Other (See Comments)    Penicillins     Percocet  [Oxycodone-Acetaminophen]  Pneumococcal Vac Polyvalent Hives    Povidone Iodine     Sulfa Antibiotics       Immunizations: There is no immunization history for the selected administration types on file for this patient  Health Maintenance: There are no preventive care reminders to display for this patient  Topic Date Due    COVID-19 Vaccine (1) Never done    Pneumococcal Vaccine: 65+ Years (1 - PCV) Never done    Influenza Vaccine (1) 09/01/2022      Medicare Screening Tests and Risk Assessments:     Maddi Odom is here for her Subsequent Wellness visit  Health Risk Assessment:   Patient rates overall health as good  Patient feels that their physical health rating is same  Patient is very satisfied with their life  Eyesight was rated as same  Hearing was rated as slightly worse  Patient feels that their emotional and mental health rating is much better  Patients states they are never, rarely angry  Patient states they are always unusually tired/fatigued  Pain experienced in the last 7 days has been a lot  Patient's pain rating has been 5/10  Patient states that she has experienced no weight loss or gain in last 6 months  Depression Screening:   PHQ-2 Score: 0      Fall Risk Screening: In the past year, patient has experienced: no history of falling in past year      Urinary Incontinence Screening:   Patient has leaked urine accidently in the last six months  Home Safety:  Patient has trouble with stairs inside or outside of their home  Patient has working smoke alarms and has working carbon monoxide detector  Home safety hazards include: none  Nutrition:   Current diet is Regular  Medications:   Patient is currently taking over-the-counter supplements  OTC medications include: see medication list  Patient is able to manage medications       Activities of Daily Living (ADLs)/Instrumental Activities of Daily Living (IADLs):   Walk and transfer into and out of bed and chair?: Yes  Dress and groom yourself?: Yes    Bathe or shower yourself?: Yes    Feed yourself? Yes  Do your laundry/housekeeping?: Yes  Manage your money, pay your bills and track your expenses?: Yes  Make your own meals?: Yes    Do your own shopping?: Yes    Previous Hospitalizations:   Any hospitalizations or ED visits within the last 12 months?: Yes    How many hospitalizations have you had in the last year?: 1-2    Advance Care Planning:   Living will: Yes    Durable POA for healthcare: Yes    Advanced directive: Yes      Cognitive Screening:   Provider or family/friend/caregiver concerned regarding cognition?: No    PREVENTIVE SCREENINGS      Cardiovascular Screening:    General: Screening Not Indicated and History Lipid Disorder      Diabetes Screening:     General: Screening Current      Colorectal Cancer Screening:     General: Screening Not Indicated      Breast Cancer Screening:     General: Screening Current      Cervical Cancer Screening:    General: Screening Not Indicated      Osteoporosis Screening:    General: Screening Not Indicated and History Osteoporosis      Abdominal Aortic Aneurysm (AAA) Screening:        General: Screening Not Indicated      Lung Cancer Screening:     General: Screening Not Indicated      Hepatitis C Screening:    General: Screening Not Indicated    Screening, Brief Intervention, and Referral to Treatment (SBIRT)    Screening  Typical number of drinks in a day: 0  Typical number of drinks in a week: 0  Interpretation: Low risk drinking behavior      Single Item Drug Screening:  How often have you used an illegal drug (including marijuana) or a prescription medication for non-medical reasons in the past year? never    Single Item Drug Screen Score: 0  Interpretation: Negative screen for possible drug use disorder    No exam data present     Physical Exam:     /62   Pulse 70   Temp (!) 97 °F (36 1 °C)   Resp 18   Ht 5' 1" (1 549 m)   Wt 82 1 kg (181 lb)   SpO2 98%   BMI 34 20 kg/m² Physical Exam  Constitutional:       General: She is not in acute distress  Appearance: She is well-developed  She is not ill-appearing, toxic-appearing or diaphoretic  HENT:      Head: Normocephalic and atraumatic  Eyes:      Conjunctiva/sclera: Conjunctivae normal    Cardiovascular:      Rate and Rhythm: Tachycardia present  Rhythm irregular  Heart sounds: Normal heart sounds  No murmur heard  Pulmonary:      Effort: Pulmonary effort is normal  No respiratory distress  Breath sounds: Normal breath sounds  No stridor  No wheezing or rales  Abdominal:      General: There is no distension  Palpations: Abdomen is soft  There is no mass  Tenderness: There is no abdominal tenderness  There is no guarding or rebound  Musculoskeletal:      Cervical back: Neck supple  Right lower leg: No edema  Left lower leg: No edema  Skin:     General: Skin is warm and dry  Neurological:      Mental Status: She is alert and oriented to person, place, and time  Psychiatric:         Mood and Affect: Mood normal          Behavior: Behavior normal          Thought Content:  Thought content normal          Judgment: Judgment normal           Herminia Souza MD

## 2022-07-15 DIAGNOSIS — I48.91 ATRIAL FIBRILLATION, UNSPECIFIED TYPE (HCC): Primary | ICD-10-CM

## 2022-07-15 LAB
ATRIAL RATE: 277 BPM
ATRIAL RATE: 61 BPM
P AXIS: 46 DEGREES
PR INTERVAL: 238 MS
QRS AXIS: -11 DEGREES
QRS AXIS: -2 DEGREES
QRSD INTERVAL: 80 MS
QRSD INTERVAL: 84 MS
QT INTERVAL: 278 MS
QT INTERVAL: 408 MS
QTC INTERVAL: 410 MS
QTC INTERVAL: 413 MS
T WAVE AXIS: 256 DEGREES
T WAVE AXIS: 5 DEGREES
VENTRICULAR RATE: 133 BPM
VENTRICULAR RATE: 61 BPM

## 2022-07-15 PROCEDURE — 93010 ELECTROCARDIOGRAM REPORT: CPT | Performed by: INTERNAL MEDICINE

## 2022-07-15 NOTE — TELEPHONE ENCOUNTER
Call from PCP office yesterday afternoon   Pt there for visit and EKG showed new onset A Fib  Pt has been c/o sob as told to PCP  PCP felt patient needed urgent eval and pt sent to ER  Given IV Lopressor  EKGs in chart  Discharged to home  She has been started on Lopressor 25 mg BID and Xarelto 20 mg daily  FYI  Please advise

## 2022-07-15 NOTE — TELEPHONE ENCOUNTER
I spoke with Romulo Gaspar  She is feeling better today, is less short of breath  She will come in to the [address] [place] [address] office on Monday 7/18 to have 24 hour holter monitor applied  She is scheduled for follow up visit with Ho Rojo on 7/27

## 2022-07-18 ENCOUNTER — PROCEDURE VISIT (OUTPATIENT)
Dept: CARDIOLOGY CLINIC | Facility: CLINIC | Age: 87
End: 2022-07-18

## 2022-07-18 DIAGNOSIS — I48.91 NEW ONSET A-FIB (HCC): Primary | ICD-10-CM

## 2022-07-18 PROCEDURE — RECHECK: Performed by: INTERNAL MEDICINE

## 2022-07-21 ENCOUNTER — HOSPITAL ENCOUNTER (OUTPATIENT)
Dept: NON INVASIVE DIAGNOSTICS | Facility: HOSPITAL | Age: 87
Discharge: HOME | End: 2022-07-21
Payer: COMMERCIAL

## 2022-07-21 DIAGNOSIS — I48.91 ATRIAL FIBRILLATION, UNSPECIFIED TYPE (HCC): ICD-10-CM

## 2022-07-21 PROCEDURE — 93226 XTRNL ECG REC<48 HR SCAN A/R: CPT

## 2022-07-21 PROCEDURE — 93227 XTRNL ECG REC<48 HR R&I: CPT | Performed by: INTERNAL MEDICINE

## 2022-07-21 PROCEDURE — 93225 XTRNL ECG REC<48 HRS REC: CPT

## 2022-07-26 NOTE — PROGRESS NOTES
Cardiology Follow Up    Deysi Galo  1935  [id number]  [address]  [facility]  [address]   [phone]    1  Paroxysmal atrial fibrillation (HCC)  POCT ECG    Echo complete w/ contrast if indicated   2  Dyspnea on exertion  NM myocardial perfusion spect (rx stress and/or rest)   3  Mixed hyperlipidemia     4  Coronary artery disease involving native coronary artery of native heart without angina pectoris         Interval History:   On 7/14/22 Ms Jayshree Parks presented to [place] [address] ED with shortness of breath  EKG showed new onset atrial fibrillation with  BPM   /92, K+ 5 6  She was started on metoprolol tartrate 25 mg q 12 hours, Xarelto 20 mg daily  Jayy Peng was discharged home and instructed to follow up with Cardiology      Ms Jayshree Parks presents to our office for a recent ED follow up visit  She is accompanied by her daughter today  Jayy Peng admits to fatigue and shortness of breath since May  She has been more short of breath with walking 100 feet  She admits to snoring at night denies chest pain palpitations lightheadedness or dizziness         Medical History   Resides at [address] [place] [address]   2/23/15  sp ROSITA to 1st diag 80% lesion sp Xience Xpedition Rx ROSITA  Hyperlipidemia 11/18/21 , TG 59, HDL 68, LDL 65  Obesity   COVID -19 2020 7/21/22 24 hour Holter monitor showed AV HR 56 BPM,    Minimal heart rate 42 beats per minute maximum heart rate 92 beats per minute  Ventricular beats 0 2%  No evidence atrial fibrillation    Allergic to 23 medications     1/21/21 TTE:  LVEF 60% no regional wall motion abnormality grade 1 diastolic dysfunction, mild MR, no evidence aortic valve regurgitation or stenosis        2/23/15 [facility]  1st diagonal 80% stenosis, proximal ramus intermedius 60% stenosis, proximal RCA 50% stenosis, mid RCA 50% stenosis, status post drug-eluting stent to 1st diagonal 80% stenosis, Xience Xpedition Rx  Patient Active Problem List   Diagnosis    Ambulatory dysfunction    H/O right knee surgery    CAD in native artery    History of cardiac catheterization    Hyperlipidemia    Multiple allergies    Bursitis of left hip    Osteoporosis    Esophageal reflux    Class 1 obesity due to excess calories without serious comorbidity with body mass index (BMI) of 32 0 to 32 9 in adult    Ingrowing toenail with infection    Primary osteoarthritis of right shoulder    Chronic midline low back pain without sciatica    Localized edema    Stress incontinence of urine    Adhesive capsulitis of right shoulder    Thrombocytopenia (HCC)    Atrial fibrillation with rapid ventricular response ([facility] [address] )     Past Medical History:   Diagnosis Date    Acute bronchitis 3/17/2022    Arthritis     Cardiac disease     Hyperlipidemia     Hypertension     Hypertension, essential, benign 11/6/2018    Preop cardiovascular exam 11/5/2019    Rash 6/3/2020    UTI (urinary tract infection) 5/31/2017     Social History     Socioeconomic History    Marital status:      Spouse name: Not on file    Number of children: 4    Years of education: Not on file    Highest education level: Not on file   Occupational History    Occupation: Retired   Tobacco Use    Smoking status: Never Smoker    Smokeless tobacco: Never Used   Vaping Use    Vaping Use: Never used   Substance and Sexual Activity    Alcohol use: No     Comment: social alcohol use, per [employer]    Drug use: No    Sexual activity: Not Currently   Other Topics Concern    Not on file   Social History Narrative    Caffeine use, active    Completed 8th grade    No caffeine use, active, per [employer]     Social Determinants of Health     Financial Resource Strain: Not on file   Food Insecurity: Not on file   Transportation Needs: Not on file   Physical Activity: Not on file   Stress: Not on file   Social Connections: Not on file   Intimate Partner Violence: Not on file   Housing Stability: Not on file      Family History   Problem Relation Age of Onset    Breast cancer Mother 80    Leukemia Mother     Gout Mother     Hypertension Mother     Bone cancer Father 64    Cervical cancer Sister 52    Aneurysm Family     Heart attack Family         myocardial infarction    Rheum arthritis Family     Lung cancer Sister 70    No Known Problems Daughter     No Known Problems Sister     No Known Problems Daughter     No Known Problems Daughter     No Known Problems Son     No Known Problems Maternal Aunt     No Known Problems Maternal Aunt     No Known Problems Maternal Aunt     No Known Problems Paternal Aunt      Past Surgical History:   Procedure Laterality Date    APPENDECTOMY      CORONARY ANGIOPLASTY WITH STENT PLACEMENT      CYSTOSCOPY  12/29/2014    Diagnostic    HERNIA REPAIR      JOINT REPLACEMENT      bilat knee, bilat hip, L shoulder    KNEE SURGERY Bilateral     SHOULDER SURGERY  2012    TOTAL HIP ARTHROPLASTY Bilateral        Current Outpatient Medications:     acetaminophen (TYLENOL) 650 mg CR tablet, Take by mouth, Disp: , Rfl:     Ascorbic Acid (VITAMIN C) 1000 MG tablet, Take 1,000 mg by mouth daily, Disp: , Rfl:     aspirin 81 MG tablet, Take 81 mg by mouth daily, Disp: , Rfl:     calcium citrate-vitamin D (CITRACAL+D) 315-200 MG-UNIT per tablet, Take 1 tablet by mouth 2 (two) times a day, Disp: , Rfl:     Cholecalciferol (VITAMIN D3) 1000 units CAPS, Take 1 tablet by mouth daily, Disp: , Rfl:     famotidine (PEPCID) 20 mg tablet, TAKE 1 TABLET(20 MG) BY MOUTH DAILY, Disp: 90 tablet, Rfl: 1    metoprolol tartrate (LOPRESSOR) 25 mg tablet, Take 1 tablet (25 mg total) by mouth every 12 (twelve) hours, Disp: 60 tablet, Rfl: 2    Multiple Vitamins-Minerals (CENTRUM ADULTS PO), Take 1 tablet by mouth daily, Disp: , Rfl:     nitroglycerin (NITROSTAT) 0 4 mg SL tablet, Place 1 tablet (0 4 mg total) under the tongue every 5 (five) minutes as needed for chest pain Up to 3 doses    Call 911 if pain persists (Patient not taking: No sig reported), Disp: 25 tablet, Rfl: 0    rivaroxaban (Xarelto) 20 mg tablet, Take 1 tablet (20 mg total) by mouth daily with breakfast, Disp: 30 tablet, Rfl: 2    simvastatin (ZOCOR) 40 mg tablet, TAKE 1 TABLET(40 MG) BY MOUTH DAILY, Disp: 90 tablet, Rfl: 1  Allergies   Allergen Reactions    Cephalexin Hives    Ciprofloxacin Hives    Cortisone Headache    Cortizone-10 [Hydrocortisone] Headache    Cyclophosphamide Hives    Dexamethasone Hives    Erythromycin Hives    Hemophilus B Polysaccharide Vaccine Hives    Influenza Virus Vaccine Hives    Iodinated Casein     Loratadine     Macrobid  [Nitrofurantoin Monohyd Macro] Hives    Motrin [Ibuprofen] Itching    Mucinex Chest Congestion Child [Guaifenesin] Itching    Niacin Itching    Nitrofurantoin     Omeprazole     Oxycodone-Acetaminophen Other (See Comments)    Penicillins     Percocet  [Oxycodone-Acetaminophen]     Pneumococcal Vac Polyvalent Hives    Povidone Iodine     Sulfa Antibiotics        Labs:  Admission on 07/14/2022, Discharged on 07/14/2022   Component Date Value    Ventricular Rate 07/14/2022 133     Atrial Rate 07/14/2022 277     QRSD Interval 07/14/2022 80     QT Interval 07/14/2022 278     QTC Interval 07/14/2022 413     QRS Axis 07/14/2022 -2     T Wave Axis 07/14/2022 256     WBC 07/14/2022 8 73     RBC 07/14/2022 3 90     Hemoglobin 07/14/2022 12 9     Hematocrit 07/14/2022 39 7     MCV 07/14/2022 102 (A)    MCH 07/14/2022 33 1     MCHC 07/14/2022 32 5     RDW 07/14/2022 12 9     MPV 07/14/2022 10 0     Platelets 18/89/8490 197     nRBC 07/14/2022 0     Neutrophils Relative 07/14/2022 50     Immat GRANS % 07/14/2022 0     Lymphocytes Relative 07/14/2022 38     Monocytes Relative 07/14/2022 10     Eosinophils Relative 07/14/2022 1     Basophils Relative 07/14/2022 1     Neutrophils Absolute 07/14/2022 4 36     Immature Grans Absolute 07/14/2022 0 03     Lymphocytes Absolute 07/14/2022 3 29     Monocytes Absolute 07/14/2022 0 86     Eosinophils Absolute 07/14/2022 0 12     Basophils Absolute 07/14/2022 0 07     Sodium 07/14/2022 139     Potassium 07/14/2022 5 6 (A)    Chloride 07/14/2022 105     CO2 07/14/2022 24     ANION GAP 07/14/2022 10     BUN 07/14/2022 20     Creatinine 07/14/2022 0 78     Glucose 07/14/2022 89     Calcium 07/14/2022 9 8     AST 07/14/2022 49 (A)    ALT 07/14/2022 10     Alkaline Phosphatase 07/14/2022 73     Total Protein 07/14/2022 6 9     Albumin 07/14/2022 4 0     Total Bilirubin 07/14/2022 0 69     eGFR 07/14/2022 69     TSH 3RD GENERATON 07/14/2022 2 815     Magnesium 07/14/2022 2 4     hs TnI 0hr 07/14/2022 7     Protime 07/14/2022 13 2     INR 07/14/2022 1 00     PTT 07/14/2022 29     hs TnI 2hr 07/14/2022 10     Delta 2hr hsTnI 07/14/2022 3     Sodium 07/14/2022 142     Potassium 07/14/2022 3 6     Chloride 07/14/2022 107     CO2 07/14/2022 27     ANION GAP 07/14/2022 8     BUN 07/14/2022 18     Creatinine 07/14/2022 0 76     Glucose 07/14/2022 133     Calcium 07/14/2022 9 4     eGFR 07/14/2022 71     Ventricular Rate 07/14/2022 61     Atrial Rate 07/14/2022 61     MD Interval 07/14/2022 238     QRSD Interval 07/14/2022 84     QT Interval 07/14/2022 408     QTC Interval 07/14/2022 410     P Axis 07/14/2022 46     QRS Axis 07/14/2022 -11     T Wave Axis 07/14/2022 5      Imaging: Holter monitor    Result Date: 7/21/2022  Narrative:  Indication: atrial fibrilaltion Monitoring period: 23 hr 59 min Predominant rhythm: Sinus rhythm Minimum HR: 42 bpm Average HR: 56 bpm Maximum HR: 92 bpm Ventricular ectopies: 159 (0 2%) Ventricular runs: 0 Supraventricular ectopies: 46 (0 1%) Supraventricular runs: 0 Longest RR: 1 8 sec Arrhythmias: No sustained arrhythmias recorded Symptom diary submitted: yes Impression Patient was predominantly in sinus rhythm with an average heart rate of 56 bpm  No significant ventricular and supraventricular ectopies were noted  No atrial fibrillation was noted  No significant pauses were noted  No symptoms reported during the monitoring period  Signed by: Cardiology fellow: Steph Gill DO Cardiology attending: Matti Gray MD        Review of Systems:  Review of Systems   Constitutional: Positive for fatigue  Respiratory: Positive for shortness of breath  Short of breath walking 100 ft which is worsening since May   Musculoskeletal: Positive for arthralgias and myalgias  All other systems reviewed and are negative  Physical Exam:  Physical Exam  Vitals reviewed  Constitutional:       Appearance: She is obese  Cardiovascular:      Rate and Rhythm: Bradycardia present  Pulses: Normal pulses  Heart sounds: Normal heart sounds  Pulmonary:      Effort: Pulmonary effort is normal       Breath sounds: Normal breath sounds  Abdominal:      General: Bowel sounds are normal       Palpations: Abdomen is soft  Musculoskeletal:         General: Normal range of motion  Cervical back: Normal range of motion and neck supple  Right lower leg: No edema  Left lower leg: No edema  Skin:     General: Skin is warm and dry  Capillary Refill: Capillary refill takes less than 2 seconds  Neurological:      General: No focal deficit present  Mental Status: She is alert and oriented to person, place, and time  Psychiatric:         Mood and Affect: Mood normal          Behavior: Behavior normal          Discussion/Summary:  1  Paroxysmal atrial fibrillation [id number]= 3 ( Age, female) continue on Xarelto 20mg daily for stroke prevention  EKG done in the office today shows sinus bradycardia 57 beats per minute  Continue metoprolol  Tartrate 25 mg q 12 hours denies lightheadedness or dizziness  Admits to snoring at night  Ambulatory referral to Sleep Medicine rule out sleep apnea disorder was placed by myself in the past I have reinforced follow-up with sleep medicine  I have educated Jorge Murphy  on monitoring for signs and symptoms of bleeding when to seek medical attention  2 Dyspnea on exertion walking 100 feet worsening since May, hx of CAD in 2015, Rx Nuclear stress test R/O ischemia contributing to symptoms  TTE evaluate valvular function for etiology contributing to SALAZAR  3  Hyperlipidemia 11/18/21 , TG 59, HDL 68, LDL 65-   Continue Zocor 40 mg daily, Heart healthy diet   4  Hx of CAD 2/23/15 [facility]: 1st diagonal 80% stenosis, proximal ramus intermedius 60% stenosis, proximal RCA 50% stenosis, mid RCA 50% stenosis, status post drug-eluting stent to 1st diagonal 80% stenosis, Xience Xpedition Rx,  Denies chest pain admits to dyspnea with exertion which is worsened,  Rx nuclear stress test,    Continue on  Metoprolol tartrate 25 mg q 12 hours, simvastatin 40 mg daily Xarelto 20 mg daily,  Aspirin 81 mg daily heart healthy diet

## 2022-07-27 ENCOUNTER — OFFICE VISIT (OUTPATIENT)
Dept: CARDIOLOGY CLINIC | Facility: CLINIC | Age: 87
End: 2022-07-27
Payer: COMMERCIAL

## 2022-07-27 VITALS
BODY MASS INDEX: 34.08 KG/M2 | HEIGHT: 61 IN | SYSTOLIC BLOOD PRESSURE: 126 MMHG | HEART RATE: 57 BPM | OXYGEN SATURATION: 96 % | DIASTOLIC BLOOD PRESSURE: 68 MMHG | WEIGHT: 180.5 LBS

## 2022-07-27 DIAGNOSIS — I48.0 PAROXYSMAL ATRIAL FIBRILLATION (HCC): Primary | ICD-10-CM

## 2022-07-27 DIAGNOSIS — R06.09 DYSPNEA ON EXERTION: ICD-10-CM

## 2022-07-27 DIAGNOSIS — I25.10 CORONARY ARTERY DISEASE INVOLVING NATIVE CORONARY ARTERY OF NATIVE HEART WITHOUT ANGINA PECTORIS: ICD-10-CM

## 2022-07-27 DIAGNOSIS — E78.2 MIXED HYPERLIPIDEMIA: ICD-10-CM

## 2022-07-27 PROBLEM — I74.9 THROMBOEMBOLIC DISORDER (HCC): Status: ACTIVE | Noted: 2022-07-27

## 2022-07-27 PROCEDURE — 1160F RVW MEDS BY RX/DR IN RCRD: CPT | Performed by: NURSE PRACTITIONER

## 2022-07-27 PROCEDURE — 93000 ELECTROCARDIOGRAM COMPLETE: CPT | Performed by: NURSE PRACTITIONER

## 2022-07-27 PROCEDURE — 99215 OFFICE O/P EST HI 40 MIN: CPT | Performed by: NURSE PRACTITIONER

## 2022-07-27 NOTE — PATIENT INSTRUCTIONS
Rx Nuclear stress test   Hold Metoprolol Prior to stress test  Ambulatory referral to Sleep medicine R/O sleep apnea  2 D echocardiogram

## 2022-08-09 ENCOUNTER — TELEPHONE (OUTPATIENT)
Dept: CARDIOLOGY CLINIC | Facility: CLINIC | Age: 87
End: 2022-08-09

## 2022-08-09 ENCOUNTER — HOSPITAL ENCOUNTER (OUTPATIENT)
Dept: NON INVASIVE DIAGNOSTICS | Facility: CLINIC | Age: 87
Discharge: HOME | End: 2022-08-09
Payer: COMMERCIAL

## 2022-08-09 VITALS
SYSTOLIC BLOOD PRESSURE: 126 MMHG | WEIGHT: 180 LBS | DIASTOLIC BLOOD PRESSURE: 68 MMHG | BODY MASS INDEX: 33.99 KG/M2 | HEIGHT: 61 IN | HEART RATE: 62 BPM

## 2022-08-09 VITALS
HEIGHT: 61 IN | HEART RATE: 53 BPM | SYSTOLIC BLOOD PRESSURE: 126 MMHG | BODY MASS INDEX: 33.99 KG/M2 | WEIGHT: 180 LBS | DIASTOLIC BLOOD PRESSURE: 62 MMHG | OXYGEN SATURATION: 98 %

## 2022-08-09 DIAGNOSIS — R06.09 DYSPNEA ON EXERTION: ICD-10-CM

## 2022-08-09 DIAGNOSIS — I48.0 PAROXYSMAL ATRIAL FIBRILLATION (HCC): ICD-10-CM

## 2022-08-09 LAB
AORTIC ROOT: 3.4 CM
APICAL FOUR CHAMBER EJECTION FRACTION: 53 %
ASCENDING AORTA: 3.5 CM
AV REGURGITATION PRESSURE HALF TIME: 500 MS
E WAVE DECELERATION TIME: 276 MS
FRACTIONAL SHORTENING: 33 % (ref 28–44)
INTERVENTRICULAR SEPTUM IN DIASTOLE (PARASTERNAL SHORT AXIS VIEW): 1.1 CM
INTERVENTRICULAR SEPTUM: 1.1 CM (ref 0.6–1.1)
LAAS-AP2: 19.7 CM2
LAAS-AP4: 23.4 CM2
LEFT ATRIUM SIZE: 3.8 CM
LEFT INTERNAL DIMENSION IN SYSTOLE: 3.2 CM (ref 2.1–4)
LEFT VENTRICULAR INTERNAL DIMENSION IN DIASTOLE: 4.8 CM (ref 3.5–6)
LEFT VENTRICULAR POSTERIOR WALL IN END DIASTOLE: 1.1 CM
LEFT VENTRICULAR STROKE VOLUME: 67 ML
LVSV (TEICH): 67 ML
MV E'TISSUE VEL-SEP: 5 CM/S
MV PEAK A VEL: 0.77 M/S
MV PEAK E VEL: 55 CM/S
MV STENOSIS PRESSURE HALF TIME: 80 MS
MV VALVE AREA P 1/2 METHOD: 2.75 CM2
NUC STRESS EJECTION FRACTION: 69 %
RA PRESSURE ESTIMATED: 5 MMHG
RATE PRESSURE PRODUCT: NORMAL
RIGHT ATRIUM AREA SYSTOLE A4C: 17.9 CM2
RIGHT VENTRICLE ID DIMENSION: 4 CM
RV PSP: 28 MMHG
SL CV AV DECELERATION TIME RETROGRADE: 1725 MS
SL CV AV PEAK GRADIENT RETROGRADE: 77 MMHG
SL CV LEFT ATRIUM LENGTH A2C: 5 CM
SL CV LV EF: 60
SL CV PED ECHO LEFT VENTRICLE DIASTOLIC VOLUME (MOD BIPLANE) 2D: 108 ML
SL CV PED ECHO LEFT VENTRICLE SYSTOLIC VOLUME (MOD BIPLANE) 2D: 41 ML
SL CV REST NUCLEAR ISOTOPE DOSE: 10.86 MCI
SL CV STRESS NUCLEAR ISOTOPE DOSE: 31.9 MCI
SL CV STRESS RECOVERY BP: NORMAL MMHG
SL CV STRESS RECOVERY HR: 75 BPM
SL CV STRESS RECOVERY O2 SAT: 99 %
STRESS ANGINA INDEX: 0
STRESS BASELINE BP: NORMAL MMHG
STRESS BASELINE HR: 53 BPM
STRESS O2 SAT REST: 98 %
STRESS PEAK HR: 90 BPM
STRESS POST O2 SAT PEAK: 100 %
STRESS POST PEAK BP: 122 MMHG
STRESS ST DEPRESSION: 0 MM
STRESS/REST PERFUSION RATIO: 0.99
TR MAX PG: 23 MMHG
TR PEAK VELOCITY: 2.4 M/S
TRICUSPID VALVE PEAK REGURGITATION VELOCITY: 2.37 M/S

## 2022-08-09 PROCEDURE — 93306 TTE W/DOPPLER COMPLETE: CPT

## 2022-08-09 PROCEDURE — G1004 CDSM NDSC: HCPCS

## 2022-08-09 PROCEDURE — 93018 CV STRESS TEST I&R ONLY: CPT | Performed by: INTERNAL MEDICINE

## 2022-08-09 PROCEDURE — 78452 HT MUSCLE IMAGE SPECT MULT: CPT | Performed by: INTERNAL MEDICINE

## 2022-08-09 PROCEDURE — 93016 CV STRESS TEST SUPVJ ONLY: CPT | Performed by: INTERNAL MEDICINE

## 2022-08-09 PROCEDURE — 78452 HT MUSCLE IMAGE SPECT MULT: CPT

## 2022-08-09 PROCEDURE — 93306 TTE W/DOPPLER COMPLETE: CPT | Performed by: INTERNAL MEDICINE

## 2022-08-09 PROCEDURE — 93017 CV STRESS TEST TRACING ONLY: CPT

## 2022-08-09 RX ADMIN — REGADENOSON 0.4 MG: 0.08 INJECTION, SOLUTION INTRAVENOUS at 11:19

## 2022-08-09 NOTE — TELEPHONE ENCOUNTER
----- Message from Javy Shepherd, [address] sent at 8/9/2022  2:13 PM EDT -----  Please call Ai Presley and inform her no sig change in TTE from previous  Thank you     Spoke with pt re: no sig change in TTE from previous

## 2022-08-09 NOTE — TELEPHONE ENCOUNTER
----- Message from Baron Murphy, [address] sent at 8/9/2022  2:12 PM EDT -----  Please call Saloni Gold and inform her the stress test was normal   Thank you     Spoke with pt re: normal ST rslts

## 2022-08-10 LAB
CHEST PAIN STATEMENT: NORMAL
MAX DIASTOLIC BP: 62 MMHG
MAX HEART RATE: 99 BPM
MAX PREDICTED HEART RATE: 134 BPM
MAX. SYSTOLIC BP: 126 MMHG
PROTOCOL NAME: NORMAL
REASON FOR TERMINATION: NORMAL
TARGET HR FORMULA: NORMAL
TEST INDICATION: NORMAL
TIME IN EXERCISE PHASE: NORMAL

## 2022-08-11 DIAGNOSIS — I48.91 ATRIAL FIBRILLATION WITH RAPID VENTRICULAR RESPONSE (HCC): ICD-10-CM

## 2022-09-01 DIAGNOSIS — K21.9 GASTROESOPHAGEAL REFLUX DISEASE: ICD-10-CM

## 2022-09-01 RX ORDER — FAMOTIDINE 20 MG/1
TABLET, FILM COATED ORAL
Qty: 90 TABLET | Refills: 1 | Status: SHIPPED | OUTPATIENT
Start: 2022-09-01

## 2022-09-19 ENCOUNTER — TELEPHONE (OUTPATIENT)
Dept: INTERNAL MEDICINE CLINIC | Facility: CLINIC | Age: 87
End: 2022-09-19

## 2022-09-19 ENCOUNTER — APPOINTMENT (OUTPATIENT)
Dept: LAB | Facility: CLINIC | Age: 87
End: 2022-09-19
Payer: COMMERCIAL

## 2022-09-19 DIAGNOSIS — R39.9 UTI SYMPTOMS: Primary | ICD-10-CM

## 2022-09-19 DIAGNOSIS — R39.9 UTI SYMPTOMS: ICD-10-CM

## 2022-09-19 LAB
BACTERIA UR QL AUTO: ABNORMAL /HPF
BILIRUB UR QL STRIP: NEGATIVE
CLARITY UR: ABNORMAL
COLOR UR: ABNORMAL
GLUCOSE UR STRIP-MCNC: NEGATIVE MG/DL
HGB UR QL STRIP.AUTO: ABNORMAL
HYALINE CASTS #/AREA URNS LPF: ABNORMAL /LPF
KETONES UR STRIP-MCNC: NEGATIVE MG/DL
LEUKOCYTE ESTERASE UR QL STRIP: ABNORMAL
MUCOUS THREADS UR QL AUTO: ABNORMAL
NITRITE UR QL STRIP: POSITIVE
NON-SQ EPI CELLS URNS QL MICRO: ABNORMAL /HPF
PH UR STRIP.AUTO: 5.5 [PH]
PROT UR STRIP-MCNC: ABNORMAL MG/DL
RBC #/AREA URNS AUTO: ABNORMAL /HPF
SP GR UR STRIP.AUTO: 1.01 (ref 1–1.03)
UROBILINOGEN UR STRIP-ACNC: <2 MG/DL
WBC #/AREA URNS AUTO: ABNORMAL /HPF
WBC CLUMPS # UR AUTO: PRESENT /UL

## 2022-09-19 PROCEDURE — 81001 URINALYSIS AUTO W/SCOPE: CPT

## 2022-09-19 PROCEDURE — 87186 SC STD MICRODIL/AGAR DIL: CPT

## 2022-09-19 PROCEDURE — 87077 CULTURE AEROBIC IDENTIFY: CPT

## 2022-09-19 PROCEDURE — 87086 URINE CULTURE/COLONY COUNT: CPT

## 2022-09-19 NOTE — TELEPHONE ENCOUNTER
Advised pt, says she has been taking AZO since yesterday - she will go to [facility] for the urine test

## 2022-09-19 NOTE — TELEPHONE ENCOUNTER
Pt calling in requesting something be sent into her pharmacy for bladder infection, says she woke up yesterday with urgency and pain with urination

## 2022-09-19 NOTE — TELEPHONE ENCOUNTER
She ahs so many drug allergies that we have no oral options I can just call in  She needs to get a urine test and I will order it  She may take an OTC pill like Azo to relieve her symptoms for a few days

## 2022-09-20 ENCOUNTER — TELEPHONE (OUTPATIENT)
Dept: INTERNAL MEDICINE CLINIC | Facility: CLINIC | Age: 87
End: 2022-09-20

## 2022-09-20 DIAGNOSIS — N30.01 ACUTE CYSTITIS WITH HEMATURIA: Primary | ICD-10-CM

## 2022-09-20 RX ORDER — CEFPODOXIME PROXETIL 100 MG/1
100 TABLET, FILM COATED ORAL 2 TIMES DAILY
Qty: 6 TABLET | Refills: 0 | Status: SHIPPED | OUTPATIENT
Start: 2022-09-20 | End: 2022-09-26 | Stop reason: SDUPTHER

## 2022-09-20 NOTE — TELEPHONE ENCOUNTER
Spoke with patient that her UA does point to a UTI  She started with symptoms 2-3 days ago  She has multiple allergic reactions to antibiotics  I will prescribed cefpodoxime which she tolerated in the past  +hematuria which she says is not new, since she was young  She lockett top Azo since it has not helped x 2-3 days

## 2022-09-20 NOTE — TELEPHONE ENCOUNTER
Patient requesting what her next step is? She completed her UA yesterday wanted to know her results?

## 2022-09-21 LAB — BACTERIA UR CULT: ABNORMAL

## 2022-09-26 ENCOUNTER — TELEPHONE (OUTPATIENT)
Dept: INTERNAL MEDICINE CLINIC | Facility: CLINIC | Age: 87
End: 2022-09-26

## 2022-09-26 DIAGNOSIS — N30.01 ACUTE CYSTITIS WITH HEMATURIA: ICD-10-CM

## 2022-09-26 DIAGNOSIS — N30.00 ACUTE CYSTITIS WITHOUT HEMATURIA: Primary | ICD-10-CM

## 2022-09-26 RX ORDER — CEFPODOXIME PROXETIL 100 MG/1
100 TABLET, FILM COATED ORAL 2 TIMES DAILY
Qty: 14 TABLET | Refills: 0 | Status: SHIPPED | OUTPATIENT
Start: 2022-09-26 | End: 2022-10-03

## 2022-09-26 NOTE — TELEPHONE ENCOUNTER
Spoke with patient  She reports that she did improve on the cefpodoxime Margaret Meyers) which she finished on Friday but the following day, she restarted with symptoms  Since there was improvement, I will have her resume the Vantin and take for a week  Advised to go to call the office/go to the ER if she continues to have the symptoms or gets worse in spite of the Vantin

## 2022-09-26 NOTE — TELEPHONE ENCOUNTER
She will have to go to the ER for IV antibiotics since I do not have other oral options for her given her allergies

## 2022-09-26 NOTE — TELEPHONE ENCOUNTER
Patient called in to report that she finished the antibiotics given for UTIsymptoms on Friday AM and has the same symptoms as when she started, bladder pressure, pain, frequent urination  She would like to know what else to do  Please advise

## 2022-10-11 ENCOUNTER — APPOINTMENT (OUTPATIENT)
Dept: LAB | Facility: CLINIC | Age: 87
End: 2022-10-11
Payer: COMMERCIAL

## 2022-10-11 DIAGNOSIS — I48.91 ATRIAL FIBRILLATION WITH RAPID VENTRICULAR RESPONSE (HCC): ICD-10-CM

## 2022-10-11 LAB
ALBUMIN SERPL BCP-MCNC: 3.7 G/DL (ref 3.5–5)
ALP SERPL-CCNC: 69 U/L (ref 34–104)
ALT SERPL W P-5'-P-CCNC: 9 U/L (ref 7–52)
ANION GAP SERPL CALCULATED.3IONS-SCNC: 4 MMOL/L (ref 4–13)
AST SERPL W P-5'-P-CCNC: 22 U/L (ref 13–39)
BILIRUB SERPL-MCNC: 0.92 MG/DL (ref 0.2–1)
BUN SERPL-MCNC: 16 MG/DL (ref 5–25)
CALCIUM SERPL-MCNC: 9.1 MG/DL (ref 8.4–10.2)
CHLORIDE SERPL-SCNC: 107 MMOL/L (ref 96–108)
CO2 SERPL-SCNC: 30 MMOL/L (ref 21–32)
CREAT SERPL-MCNC: 0.78 MG/DL (ref 0.6–1.3)
ERYTHROCYTE [DISTWIDTH] IN BLOOD BY AUTOMATED COUNT: 13 % (ref 11.6–15.1)
GFR SERPL CREATININE-BSD FRML MDRD: 69 ML/MIN/1.73SQ M
GLUCOSE P FAST SERPL-MCNC: 99 MG/DL (ref 65–99)
HCT VFR BLD AUTO: 37.8 % (ref 34.8–46.1)
HGB BLD-MCNC: 12.3 G/DL (ref 11.5–15.4)
MAGNESIUM SERPL-MCNC: 1.9 MG/DL (ref 1.9–2.7)
MCH RBC QN AUTO: 32.9 PG (ref 26.8–34.3)
MCHC RBC AUTO-ENTMCNC: 32.5 G/DL (ref 31.4–37.4)
MCV RBC AUTO: 101 FL (ref 82–98)
PLATELET # BLD AUTO: 183 THOUSANDS/UL (ref 149–390)
PMV BLD AUTO: 9.3 FL (ref 8.9–12.7)
POTASSIUM SERPL-SCNC: 3.9 MMOL/L (ref 3.5–5.3)
PROT SERPL-MCNC: 6.4 G/DL (ref 6.4–8.4)
RBC # BLD AUTO: 3.74 MILLION/UL (ref 3.81–5.12)
SODIUM SERPL-SCNC: 141 MMOL/L (ref 135–147)
TSH SERPL DL<=0.05 MIU/L-ACNC: 3.02 UIU/ML (ref 0.45–4.5)
WBC # BLD AUTO: 6.34 THOUSAND/UL (ref 4.31–10.16)

## 2022-10-11 PROCEDURE — 84443 ASSAY THYROID STIM HORMONE: CPT

## 2022-10-11 PROCEDURE — 80053 COMPREHEN METABOLIC PANEL: CPT

## 2022-10-11 PROCEDURE — 85027 COMPLETE CBC AUTOMATED: CPT

## 2022-10-11 PROCEDURE — 83735 ASSAY OF MAGNESIUM: CPT

## 2022-10-11 PROCEDURE — 36415 COLL VENOUS BLD VENIPUNCTURE: CPT

## 2022-10-18 DIAGNOSIS — I48.91 ATRIAL FIBRILLATION WITH RAPID VENTRICULAR RESPONSE (HCC): ICD-10-CM

## 2022-10-18 RX ORDER — RIVAROXABAN 20 MG/1
TABLET, FILM COATED ORAL
Qty: 30 TABLET | Refills: 2 | Status: SHIPPED | OUTPATIENT
Start: 2022-10-18

## 2022-10-20 ENCOUNTER — OFFICE VISIT (OUTPATIENT)
Dept: INTERNAL MEDICINE CLINIC | Facility: CLINIC | Age: 87
End: 2022-10-20
Payer: COMMERCIAL

## 2022-10-20 VITALS
WEIGHT: 184 LBS | DIASTOLIC BLOOD PRESSURE: 62 MMHG | SYSTOLIC BLOOD PRESSURE: 124 MMHG | HEART RATE: 67 BPM | HEIGHT: 61 IN | BODY MASS INDEX: 34.74 KG/M2 | OXYGEN SATURATION: 97 %

## 2022-10-20 DIAGNOSIS — I48.91 ATRIAL FIBRILLATION WITH RAPID VENTRICULAR RESPONSE (HCC): Primary | ICD-10-CM

## 2022-10-20 DIAGNOSIS — I25.10 CAD IN NATIVE ARTERY: ICD-10-CM

## 2022-10-20 DIAGNOSIS — E78.2 MIXED HYPERLIPIDEMIA: ICD-10-CM

## 2022-10-20 PROBLEM — D69.6 THROMBOCYTOPENIA (HCC): Status: RESOLVED | Noted: 2021-11-10 | Resolved: 2022-10-20

## 2022-10-20 PROCEDURE — 99214 OFFICE O/P EST MOD 30 MIN: CPT | Performed by: INTERNAL MEDICINE

## 2022-10-20 RX ORDER — NITROGLYCERIN 0.4 MG/1
0.4 TABLET SUBLINGUAL
Qty: 10 TABLET | Refills: 0 | Status: SHIPPED | OUTPATIENT
Start: 2022-10-20

## 2022-10-20 NOTE — PROGRESS NOTES
Assessment/Plan:    No problem-specific Assessment & Plan notes found for this encounter  Problem List Items Addressed This Visit    None     Visit Diagnoses     Encounter for immunization    -  Primary            Subjective:      Patient ID: Aurora Richard is a 80 y o  female      HPI    The following portions of the patient's history were reviewed and updated as appropriate: allergies, current medications, past family history, past medical history, past social history, past surgical history and problem list     Review of Systems      Objective:      /62   Pulse 67   Ht 5' 1" (1 549 m)   Wt 83 5 kg (184 lb)   SpO2 97%   BMI 34 77 kg/m²          Physical Exam

## 2022-10-24 ENCOUNTER — TELEPHONE (OUTPATIENT)
Dept: INTERNAL MEDICINE CLINIC | Facility: CLINIC | Age: 87
End: 2022-10-24

## 2022-10-24 DIAGNOSIS — R39.9 UTI SYMPTOMS: Primary | ICD-10-CM

## 2022-10-24 RX ORDER — CEFPODOXIME PROXETIL 100 MG/1
100 TABLET, FILM COATED ORAL 2 TIMES DAILY
Qty: 14 TABLET | Refills: 0 | Status: SHIPPED | OUTPATIENT
Start: 2022-10-24 | End: 2022-10-31

## 2022-10-24 NOTE — TELEPHONE ENCOUNTER
Patient is calling in with UTI symptoms  She states she has the urge to urinate very badly  Symptoms started yesterday  Patient did take 2 azo's and that is not helping              Please advise

## 2022-10-25 PROBLEM — L60.0 INGROWING TOENAIL WITH INFECTION: Status: RESOLVED | Noted: 2019-09-26 | Resolved: 2022-10-25

## 2022-10-25 NOTE — PROGRESS NOTES
Assessment/Plan:    Atrial fibrillation with rapid ventricular response (HCC)  Controlled on metoprolol  She is maintained on Xarelto    Hyperlipidemia  Well controlled on simvastatin           Problem List Items Addressed This Visit        Cardiovascular and Mediastinum    CAD in native artery    Relevant Medications    nitroglycerin (NITROSTAT) 0 4 mg SL tablet    Other Relevant Orders    Lipid Panel with Direct LDL reflex    Atrial fibrillation with rapid ventricular response ([address] ) - Primary     Controlled on metoprolol  She is maintained on Xarelto         Relevant Medications    nitroglycerin (NITROSTAT) 0 4 mg SL tablet    Other Relevant Orders    CBC and Platelet    Comprehensive metabolic panel       Other    Hyperlipidemia     Well controlled on simvastatin         Relevant Orders    Lipid Panel with Direct LDL reflex            Subjective:      Patient ID: Prakash Tipton is a 80 y o  female  Here with her daughter  She feels well overall found to be in Afib with RVR at her routine visit  Cardiology recommended she go to the ER where labs were unremarkable  She started metoprolol and Xarelto  She saw cardiology 2 weeks later  Nuclear stress test and echo essentially normal  She feels well, experiences SOB with moderate exertion  Denies chest pain, palpitations and LE edema  Recent UTI and took Vantin  Multiple drug allergies but tolerated the antibiotic  Her symptoms resolved      The following portions of the patient's history were reviewed and updated as appropriate: allergies, current medications, past family history, past medical history, past social history, past surgical history and problem list     Review of Systems   Constitutional: Negative for chills, fatigue, fever and unexpected weight change  Respiratory: Negative for cough and shortness of breath  Cardiovascular: Negative for chest pain, palpitations and leg swelling     Gastrointestinal: Negative for abdominal pain, constipation, diarrhea, nausea and vomiting  Genitourinary: Negative for difficulty urinating, dysuria and urgency  Neurological: Negative for dizziness and headaches  Objective:      /62   Pulse 67   Ht 5' 1" (1 549 m)   Wt 83 5 kg (184 lb)   SpO2 97%   BMI 34 77 kg/m²          Physical Exam  Constitutional:       General: She is not in acute distress  Appearance: She is well-developed  She is not ill-appearing, toxic-appearing or diaphoretic  HENT:      Head: Normocephalic and atraumatic  Eyes:      Conjunctiva/sclera: Conjunctivae normal    Cardiovascular:      Rate and Rhythm: Normal rate and regular rhythm  Heart sounds: Normal heart sounds  No murmur heard  Pulmonary:      Effort: Pulmonary effort is normal  No respiratory distress  Breath sounds: Normal breath sounds  No wheezing or rales  Abdominal:      General: There is no distension  Palpations: Abdomen is soft  There is no mass  Tenderness: There is no abdominal tenderness  There is no guarding or rebound  Musculoskeletal:      Cervical back: Neck supple  Right lower leg: No edema  Left lower leg: No edema  Neurological:      Mental Status: She is alert and oriented to person, place, and time  Psychiatric:         Mood and Affect: Mood normal          Behavior: Behavior normal          Thought Content:  Thought content normal          Judgment: Judgment normal

## 2022-11-13 DIAGNOSIS — I25.10 CAD IN NATIVE ARTERY: ICD-10-CM

## 2022-11-13 DIAGNOSIS — E78.2 MIXED HYPERLIPIDEMIA: ICD-10-CM

## 2022-11-13 RX ORDER — SIMVASTATIN 40 MG
TABLET ORAL
Qty: 90 TABLET | Refills: 1 | Status: SHIPPED | OUTPATIENT
Start: 2022-11-13

## 2022-11-13 RX ORDER — NITROGLYCERIN 0.4 MG/1
TABLET SUBLINGUAL
Qty: 25 TABLET | Refills: 1 | Status: SHIPPED | OUTPATIENT
Start: 2022-11-13

## 2022-12-01 ENCOUNTER — OFFICE VISIT (OUTPATIENT)
Dept: CARDIOLOGY CLINIC | Facility: CLINIC | Age: 87
End: 2022-12-01

## 2022-12-01 VITALS
BODY MASS INDEX: 34.92 KG/M2 | OXYGEN SATURATION: 97 % | WEIGHT: 184.8 LBS | SYSTOLIC BLOOD PRESSURE: 115 MMHG | HEART RATE: 64 BPM | DIASTOLIC BLOOD PRESSURE: 68 MMHG

## 2022-12-01 DIAGNOSIS — I48.91 ATRIAL FIBRILLATION, UNSPECIFIED TYPE (HCC): ICD-10-CM

## 2022-12-01 DIAGNOSIS — E78.2 MIXED HYPERLIPIDEMIA: ICD-10-CM

## 2022-12-01 DIAGNOSIS — R07.9 CHEST PAIN SYNDROME: ICD-10-CM

## 2022-12-01 DIAGNOSIS — I25.10 CAD IN NATIVE ARTERY: Primary | ICD-10-CM

## 2022-12-01 DIAGNOSIS — Z88.9 MULTIPLE ALLERGIES: ICD-10-CM

## 2022-12-01 DIAGNOSIS — R06.09 DYSPNEA ON EXERTION: ICD-10-CM

## 2022-12-01 NOTE — PROGRESS NOTES
Cardiology Follow Up    Theodore Hernandez  1935  [id number]  [address] [id number] [phone]    1  CAD in native artery        2  Mixed hyperlipidemia        3  Multiple allergies        4  Dyspnea on exertion        5  Chest pain syndrome        6  Atrial fibrillation, unspecified type [facility])            Interval History:  Cardiology follow-up  Patient was admitted on 7/22 with dyspnea, she was found to be in atrial fibrillation with rapid ventricular response  Interestingly Holter monitor just prior to that was normal sinus rhythm sinus bradycardia and mild chronotropic incompetence  Rashi Guise She was added beta-blocker therapy and started on full anticoagulation with Xarelto  There were no attempts for rhythm control  She was seen in our office after that, no attempts of rhythm control were made at that time either  She underwent noninvasive evaluation, mostly unrevealing  The patient currently stays feeling overall well  Denies any focal neurological deficits denies any amaurosis fugax, no significant bleeding issues on anticoagulation  No orthopnea no PND  States been compliant with low-cholesterol diet, most recent lipid profile total cholesterol 136, HDL of 31 LDL 60  Patient is on medium intensity statin therapy  Creatinine normal 0 7      Patient Active Problem List   Diagnosis   • Ambulatory dysfunction   • H/O right knee surgery   • CAD in native artery   • History of cardiac catheterization   • Hyperlipidemia   • Multiple allergies   • Bursitis of left hip   • Osteoporosis   • Esophageal reflux   • Class 1 obesity due to excess calories without serious comorbidity with body mass index (BMI) of 32 0 to 32 9 in adult   • Primary osteoarthritis of right shoulder   • Chronic midline low back pain without sciatica   • Localized edema   • Stress incontinence of urine   • Adhesive capsulitis of right shoulder   • Atrial fibrillation (HCC)   • Thromboembolic disorder (HCC)   • Dyspnea on exertion   • Chest pain syndrome     Past Medical History:   Diagnosis Date   • Acute bronchitis 3/17/2022   • Arthritis    • Cardiac disease    • Hyperlipidemia    • Hypertension    • Hypertension, essential, benign 11/6/2018   • Ingrowing toenail with infection 9/26/2019   • Preop cardiovascular exam 11/5/2019   • Rash 6/3/2020   • Thrombocytopenia ([address] ) 11/10/2021    Added per ICD-10 guidelines--provider accepted   • UTI (urinary tract infection) 5/31/2017     Social History     Socioeconomic History   • Marital status:      Spouse name: Not on file   • Number of children: 4   • Years of education: Not on file   • Highest education level: Not on file   Occupational History   • Occupation: Retired   Tobacco Use   • Smoking status: Never   • Smokeless tobacco: Never   Vaping Use   • Vaping Use: Never used   Substance and Sexual Activity   • Alcohol use: No   • Drug use: No   • Sexual activity: Not Currently   Other Topics Concern   • Not on file   Social History Narrative    Caffeine use, active    Completed 8th grade    No caffeine use, active, per [facility]     Social Determinants of Health     Financial Resource Strain: Not on file   Food Insecurity: Not on file   Transportation Needs: Not on file   Physical Activity: Not on file   Stress: Not on file   Social Connections: Not on file   Intimate Partner Violence: Not on file   Housing Stability: Not on file      Family History   Problem Relation Age of Onset   • Breast cancer Mother 80   • Leukemia Mother    • Gout Mother    • Hypertension Mother    • Bone cancer Father 64   • Cervical cancer Sister 52   • Aneurysm Family    • Heart attack Family         myocardial infarction   • Rheum arthritis Family    • Lung cancer Sister 70   • No Known Problems Daughter    • No Known Problems Sister    • No Known Problems Daughter    • No Known Problems Daughter    • No Known Problems Son    • No Known Problems Maternal Aunt    • No Known Problems Maternal Aunt    • No Known Problems Maternal Aunt    • No Known Problems Paternal Aunt      Past Surgical History:   Procedure Laterality Date   • APPENDECTOMY     • CORONARY ANGIOPLASTY WITH STENT PLACEMENT     • CYSTOSCOPY  12/29/2014    Diagnostic   • HERNIA REPAIR     • JOINT REPLACEMENT      bilat knee, bilat hip, L shoulder   • KNEE SURGERY Bilateral    • SHOULDER SURGERY  2012   • TOTAL HIP ARTHROPLASTY Bilateral        Current Outpatient Medications:   •  acetaminophen (TYLENOL) 650 mg CR tablet, Take by mouth, Disp: , Rfl:   •  Ascorbic Acid (VITAMIN C) 1000 MG tablet, Take 1,000 mg by mouth daily, Disp: , Rfl:   •  aspirin 81 MG tablet, Take 81 mg by mouth daily, Disp: , Rfl:   •  calcium citrate-vitamin D (CITRACAL+D) 315-200 MG-UNIT per tablet, Take 1 tablet by mouth 2 (two) times a day, Disp: , Rfl:   •  Cholecalciferol (VITAMIN D3) 1000 units CAPS, Take 1 tablet by mouth daily, Disp: , Rfl:   •  famotidine (PEPCID) 20 mg tablet, TAKE 1 TABLET(20 MG) BY MOUTH DAILY, Disp: 90 tablet, Rfl: 1  •  metoprolol tartrate (LOPRESSOR) 25 mg tablet, TAKE 1 TABLET(25 MG) BY MOUTH EVERY 12 HOURS, Disp: 180 tablet, Rfl: 1  •  Multiple Vitamins-Minerals (CENTRUM ADULTS PO), Take 1 tablet by mouth daily, Disp: , Rfl:   •  nitroglycerin (NITROSTAT) 0 4 mg SL tablet, PLACE 1 TABLET UNDER THE TONGUE EVERY 5 MINUTES AS NEEDED FOR CHEST PAIN UP TO 3 DOSES   CALL 911 IF PAIN PERSISTS, Disp: 25 tablet, Rfl: 1  •  simvastatin (ZOCOR) 40 mg tablet, TAKE 1 TABLET(40 MG) BY MOUTH DAILY, Disp: 90 tablet, Rfl: 1  •  Xarelto 20 MG tablet, TAKE 1 TABLET(20 MG) BY MOUTH DAILY WITH BREAKFAST, Disp: 30 tablet, Rfl: 2  Allergies   Allergen Reactions   • Cephalexin Hives   • Ciprofloxacin Hives   • Cortisone Headache   • Cortizone-10 [Hydrocortisone] Headache   • Cyclophosphamide Hives   • Dexamethasone Hives   • Erythromycin Hives   • Hemophilus B Polysaccharide Vaccine Hives   • Influenza Virus Vaccine Hives   • Iodinated Casein    • Loratadine    • Macrobid  [Nitrofurantoin Monohyd Macro] Hives   • Motrin [Ibuprofen] Itching   • Mucinex Chest Congestion Child [Guaifenesin] Itching   • Niacin Itching   • Nitrofurantoin    • Omeprazole    • Oxycodone-Acetaminophen Other (See Comments)   • Penicillins    • Percocet  [Oxycodone-Acetaminophen]    • Pneumococcal Vac Polyvalent Hives   • Povidone Iodine    • Sulfa Antibiotics        Labs:  Appointment on 10/11/2022   Component Date Value   • WBC 10/11/2022 6 34    • RBC 10/11/2022 3 74 (L)    • Hemoglobin 10/11/2022 12 3    • Hematocrit 10/11/2022 37 8    • MCV 10/11/2022 101 (H)    • MCH 10/11/2022 32 9    • MCHC 10/11/2022 32 5    • RDW 10/11/2022 13 0    • Platelets 97/52/6353 183    • MPV 10/11/2022 9 3    • Sodium 10/11/2022 141    • Potassium 10/11/2022 3 9    • Chloride 10/11/2022 107    • CO2 10/11/2022 30    • ANION GAP 10/11/2022 4    • BUN 10/11/2022 16    • Creatinine 10/11/2022 0 78    • Glucose, Fasting 10/11/2022 99    • Calcium 10/11/2022 9 1    • AST 10/11/2022 22    • ALT 10/11/2022 9    • Alkaline Phosphatase 10/11/2022 69    • Total Protein 10/11/2022 6 4    • Albumin 10/11/2022 3 7    • Total Bilirubin 10/11/2022 0 92    • eGFR 10/11/2022 69    • Magnesium 10/11/2022 1 9    • TSH 3RD GENERATON 10/11/2022 3 023    Appointment on 09/19/2022   Component Date Value   • Color, UA 09/19/2022 Light Lee    • Clarity, UA 09/19/2022 Extra Turbid    • Specific Gravity, UA 09/19/2022 1 012    • pH, UA 09/19/2022 5 5    • Leukocytes, UA 09/19/2022 Large (A)    • Nitrite, UA 09/19/2022 Positive (A)    • Protein, UA 09/19/2022 100 (2+) (A)    • Glucose, UA 09/19/2022 Negative    • Ketones, UA 09/19/2022 Negative    • Urobilinogen, UA 09/19/2022 <2 0    • Bilirubin, UA 09/19/2022 Negative    • Occult Blood, UA 09/19/2022 Large (A)    • RBC, UA 09/19/2022 Innumerable (A)    • WBC, UA 09/19/2022 Innumerable (A)    • Epithelial Cells 09/19/2022 Occasional    • Bacteria, UA 09/19/2022 Moderate (A)    • MUCUS THREADS 09/19/2022 Occasional (A)    • Hyaline Casts, UA 09/19/2022 5-10 (A)    • WBC Clumps 09/19/2022 Present    • Urine Culture 09/19/2022 >100,000 cfu/ml Escherichia coli (A)    Hospital Outpatient Visit on 08/09/2022   Component Date Value   • Baseline HR 08/09/2022 53    • Baseline BP 08/09/2022 126/62    • O2 sat rest 08/09/2022 98    • Stress peak HR 08/09/2022 90    • Post peak BP 08/09/2022 122    • Rate Pressure Product 08/09/2022 10,980 0    • O2 sat peak 08/09/2022 100    • Recovery HR 08/09/2022 75    • Recovery BP 08/09/2022 120/64    • O2 sat recovery 08/09/2022 99    • Angina Index 08/09/2022 0    • Rest Nuclear Isotope Dose 08/09/2022 10 86    • Stress Nuclear Isotope D* 08/09/2022 31 90    • ST Depression (mm) 08/09/2022 0    • Stress/rest perfusion ra* 08/09/2022 0 99    • EF (%) 08/09/2022 69    • Protocol Name 08/09/2022 Ken Feldman    • Time In Exercise Phase 08/09/2022 00:03:00    • MAX  SYSTOLIC BP 97/54/1292 209    • Max Diastolic Bp 65/73/5371 62    • Max Heart Rate 08/09/2022 99    • Max Predicted Heart Rate 08/09/2022 134    • Reason for Termination 08/09/2022 TEST COMPLETE       • Test Indication 08/09/2022 SALAZAR    • Target Hr Formular 08/09/2022 (220 - Age)*100%    • Chest Pain Statement 08/09/2022 none    Hospital Outpatient Visit on 08/09/2022   Component Date Value   • AV peak gradient 08/09/2022 77    • LA size 08/09/2022 3 8    • Triscuspid Valve Regurgi* 08/09/2022 23 0    • Tricuspid valve peak reg* 08/09/2022 2 37    • LVPWd 08/09/2022 1 10    • Left Atrium Area-systoli* 08/09/2022 19 7    • Left Atrium Area-systoli* 08/09/2022 23 4    • MV E' Tissue Velocity Se* 08/09/2022 5    • TR Peak Syd 08/09/2022 2 4    • IVSd 08/09/2022 7 85    • LV DIASTOLIC VOLUME (MOD* 99/21/0847 108    • LEFT VENTRICLE SYSTOLIC * 67/65/0163 41    • Left ventricular stroke * 08/09/2022 67 00 • AV Deceleration Time 08/09/2022 1,725    • A4C EF 08/09/2022 53    • LA length (A2C) 08/09/2022 5 00    • LVIDd 08/09/2022 4 80    • IVS 08/09/2022 1 1    • LVIDS 08/09/2022 3 20    • FS 08/09/2022 33    • Asc Ao 08/09/2022 3 5    • Ao root 08/09/2022 3 40    • RVID d 08/09/2022 4 0    • AV regurgitation pressur* 08/09/2022 500    • MV valve area p 1/2 meth* 08/09/2022 2 75    • E wave deceleration time 08/09/2022 276    • MV Peak E Syd 08/09/2022 55    • MV Peak A Syd 08/09/2022 0 77    • RAA A4C 08/09/2022 17 9    • MV stenosis pressure 1/2* 08/09/2022 80    • LVSV, 2D 08/09/2022 67    • Est  RA pres 08/09/2022 5 0    • Right Ventricular Peak S* 08/09/2022 28 00    • LV EF 08/09/2022 60    Admission on 07/14/2022, Discharged on 07/14/2022   Component Date Value   • Ventricular Rate 07/14/2022 133    • Atrial Rate 07/14/2022 277    • QRSD Interval 07/14/2022 80    • QT Interval 07/14/2022 278    • QTC Interval 07/14/2022 413    • QRS Axis 07/14/2022 -2    • T Wave Axis 07/14/2022 256    • WBC 07/14/2022 8 73    • RBC 07/14/2022 3 90    • Hemoglobin 07/14/2022 12 9    • Hematocrit 07/14/2022 39 7    • MCV 07/14/2022 102 (H)    • MCH 07/14/2022 33 1    • MCHC 07/14/2022 32 5    • RDW 07/14/2022 12 9    • MPV 07/14/2022 10 0    • Platelets 25/35/8820 197    • nRBC 07/14/2022 0    • Neutrophils Relative 07/14/2022 50    • Immat GRANS % 07/14/2022 0    • Lymphocytes Relative 07/14/2022 38    • Monocytes Relative 07/14/2022 10    • Eosinophils Relative 07/14/2022 1    • Basophils Relative 07/14/2022 1    • Neutrophils Absolute 07/14/2022 4 36    • Immature Grans Absolute 07/14/2022 0 03    • Lymphocytes Absolute 07/14/2022 3 29    • Monocytes Absolute 07/14/2022 0 86    • Eosinophils Absolute 07/14/2022 0 12    • Basophils Absolute 07/14/2022 0 07    • Sodium 07/14/2022 139    • Potassium 07/14/2022 5 6 (H)    • Chloride 07/14/2022 105    • CO2 07/14/2022 24    • ANION GAP 07/14/2022 10    • BUN 07/14/2022 20    • Creatinine 07/14/2022 0 78    • Glucose 07/14/2022 89    • Calcium 07/14/2022 9 8    • AST 07/14/2022 49 (H)    • ALT 07/14/2022 10    • Alkaline Phosphatase 07/14/2022 73    • Total Protein 07/14/2022 6 9    • Albumin 07/14/2022 4 0    • Total Bilirubin 07/14/2022 0 69    • eGFR 07/14/2022 69    • TSH 3RD GENERATON 07/14/2022 2 815    • Magnesium 07/14/2022 2 4    • hs TnI 0hr 07/14/2022 7    • Protime 07/14/2022 13 2    • INR 07/14/2022 1 00    • PTT 07/14/2022 29    • hs TnI 2hr 07/14/2022 10    • Delta 2hr hsTnI 07/14/2022 3    • Sodium 07/14/2022 142    • Potassium 07/14/2022 3 6    • Chloride 07/14/2022 107    • CO2 07/14/2022 27    • ANION GAP 07/14/2022 8    • BUN 07/14/2022 18    • Creatinine 07/14/2022 0 76    • Glucose 07/14/2022 133    • Calcium 07/14/2022 9 4    • eGFR 07/14/2022 71    • Ventricular Rate 07/14/2022 61    • Atrial Rate 07/14/2022 61    • AK Interval 07/14/2022 238    • QRSD Interval 07/14/2022 84    • QT Interval 07/14/2022 408    • QTC Interval 07/14/2022 410    • P Axis 07/14/2022 46    • QRS Axis 07/14/2022 -11    • T Wave Axis 07/14/2022 5      Imaging: No results found  Review of Systems:  Review of Systems   Constitutional: Negative for activity change and fatigue  HENT: Negative for nosebleeds  Eyes: Negative for visual disturbance  Respiratory: Positive for shortness of breath  Negative for apnea, wheezing and stridor  Cardiovascular: Negative for chest pain, palpitations and leg swelling  Gastrointestinal: Negative for abdominal pain, anal bleeding and blood in stool  Endocrine: Negative for cold intolerance  Genitourinary: Negative for hematuria  Musculoskeletal: Positive for arthralgias  Negative for gait problem and myalgias  Skin: Negative for pallor and rash  Allergic/Immunologic: Negative for immunocompromised state  Neurological: Negative for dizziness, tremors, syncope, facial asymmetry, speech difficulty and weakness     Hematological: Does not bruise/bleed easily  Psychiatric/Behavioral: Positive for sleep disturbance  The patient is not nervous/anxious  Physical Exam:  Physical Exam  Vitals reviewed  Constitutional:       General: She is not in acute distress  Appearance: Normal appearance  She is obese  She is not ill-appearing, toxic-appearing or diaphoretic  Eyes:      General: No scleral icterus  Neck:      Vascular: No carotid bruit  Cardiovascular:      Rate and Rhythm: Regular rhythm  Bradycardia present  Pulses: Normal pulses  Heart sounds: Normal heart sounds  No murmur heard  No friction rub  No gallop  Pulmonary:      Effort: Pulmonary effort is normal  No respiratory distress  Breath sounds: Normal breath sounds  No stridor  No wheezing, rhonchi or rales  Musculoskeletal:      Right lower leg: No edema  Left lower leg: No edema  Skin:     General: Skin is warm and dry  Capillary Refill: Capillary refill takes less than 2 seconds  Coloration: Skin is not jaundiced or pale  Findings: No bruising or erythema  Neurological:      Mental Status: She is alert and oriented to person, place, and time  Psychiatric:         Mood and Affect: Mood normal          Discussion/Summary:  Paroxysmal atrial fibrillation, patient is back in normal sinus rhythm  Will continue full anticoagulation and beta-blocker  Favor no antiarrhythmic at the present time  Mild-to-moderate cardioembolic risk  Recent pharmacological stress test suggested ischemia, she was in sinus rhythm at that time, an echocardiogram revealed normal left systolic function with stage I diastolic dysfunction interestingly so, there was mild left atrial enlargement and mild aortic and mitral insufficiency  She does have mild chronotropic incompetence sick sinus syndrome  No indication for pacemaker therapy at the present time  Coronary artery disease PTCA/drug stent of a large diagonal system 2015  Recent testing as above  Continue aspirin therapy and statin therapy  This note was completed in part utilizing [employer] fluency direct voice recognition software  Grammatical errors, random word insertion, spelling mistakes, and incomplete sentences may be an occasional consequence of the system secondary to software limitations, ambient noise and hardware issues  At the time of dictation, efforts were made to edit, clarify and /or correct errors  Please read the chart carefully and recognize, using context, where substitutions have occurred  If you have any questions or concerns about the context, text or information contained within the body of this dictation, please contact myself, the provider, for further clarification

## 2022-12-21 DIAGNOSIS — I48.91 ATRIAL FIBRILLATION WITH RAPID VENTRICULAR RESPONSE (HCC): ICD-10-CM

## 2022-12-21 DIAGNOSIS — K21.9 GASTROESOPHAGEAL REFLUX DISEASE: ICD-10-CM

## 2022-12-21 RX ORDER — FAMOTIDINE 20 MG/1
TABLET, FILM COATED ORAL
Qty: 90 TABLET | Refills: 1 | Status: SHIPPED | OUTPATIENT
Start: 2022-12-21

## 2023-01-19 DIAGNOSIS — I48.91 ATRIAL FIBRILLATION WITH RAPID VENTRICULAR RESPONSE (HCC): ICD-10-CM

## 2023-01-19 RX ORDER — RIVAROXABAN 20 MG/1
TABLET, FILM COATED ORAL
Qty: 30 TABLET | Refills: 2 | Status: SHIPPED | OUTPATIENT
Start: 2023-01-19

## 2023-02-06 ENCOUNTER — TELEPHONE (OUTPATIENT)
Dept: INTERNAL MEDICINE CLINIC | Facility: CLINIC | Age: 88
End: 2023-02-06

## 2023-02-06 DIAGNOSIS — R39.9 UTI SYMPTOMS: ICD-10-CM

## 2023-02-06 RX ORDER — CEFPODOXIME PROXETIL 100 MG/1
100 TABLET, FILM COATED ORAL 2 TIMES DAILY
Qty: 14 TABLET | Refills: 0 | Status: SHIPPED | OUTPATIENT
Start: 2023-02-06 | End: 2023-02-13

## 2023-02-06 NOTE — TELEPHONE ENCOUNTER
Patient is requesting medication for poss UTI she reports she started with urgency and burning last night  She is requesting a 7 day treatment because she has had this in the past and needs longer on medication to kick it out   Please advise    Uses [address] [id number]

## 2023-03-31 ENCOUNTER — NURSE TRIAGE (OUTPATIENT)
Dept: OTHER | Facility: OTHER | Age: 88
End: 2023-03-31

## 2023-04-01 NOTE — TELEPHONE ENCOUNTER
"  Reason for Disposition  • Urinating more frequently than usual (i e , frequency)    Answer Assessment - Initial Assessment Questions  1  SYMPTOM: \"What's the main symptom you're concerned about? \" (e g , frequency, incontinence)      Bladder pressure (lower abdomen), burning with urination, frequency    2  ONSET: \"When did the it  start? \"    3/31  3  PAIN: \"Is there any pain? \" If Yes, ask: \"How bad is it? \" (Scale: 1-10; mild, moderate, severe)  5/10 constant >2 hours   4  CAUSE: \"What do you think is causing the symptoms? \"   UTI  5  OTHER SYMPTOMS: \"Do you have any other symptoms? \" (e g , fever, flank pain, blood in urine, pain with urination)   Painful urination, frequency    Protocols used: URINARY SYMPTOMS-ADULT-AH    "

## 2023-04-01 NOTE — TELEPHONE ENCOUNTER
C/o new onset 3/31, Constant bladder/lower abdominal pressure rated 5/10, frequency, and painful urination  No additional symptoms reported  Care advice given  Informed to call back if worsening/developing symptoms  Verbalized understanding  Patient preferred to be seen in [place], states she does not like urgent cares  No further questions

## 2023-04-01 NOTE — TELEPHONE ENCOUNTER
Regarding: UTI infection  ----- Message from Andrade Payan sent at 3/31/2023  7:35 PM EDT -----  '' I have a UTI infection again  ''

## 2023-04-03 ENCOUNTER — NURSE TRIAGE (OUTPATIENT)
Dept: OTHER | Facility: OTHER | Age: 88
End: 2023-04-03

## 2023-04-03 ENCOUNTER — OFFICE VISIT (OUTPATIENT)
Dept: INTERNAL MEDICINE CLINIC | Facility: CLINIC | Age: 88
End: 2023-04-03

## 2023-04-03 VITALS
HEART RATE: 74 BPM | SYSTOLIC BLOOD PRESSURE: 108 MMHG | WEIGHT: 189 LBS | OXYGEN SATURATION: 98 % | TEMPERATURE: 98.5 F | DIASTOLIC BLOOD PRESSURE: 78 MMHG | BODY MASS INDEX: 35.71 KG/M2

## 2023-04-03 DIAGNOSIS — R39.9 UTI SYMPTOMS: Primary | ICD-10-CM

## 2023-04-03 DIAGNOSIS — I74.9 THROMBOEMBOLIC DISORDER (HCC): ICD-10-CM

## 2023-04-03 DIAGNOSIS — I48.91 ATRIAL FIBRILLATION, UNSPECIFIED TYPE (HCC): ICD-10-CM

## 2023-04-03 LAB
BACTERIA UR QL AUTO: ABNORMAL /HPF
BILIRUB UR QL STRIP: NEGATIVE
CLARITY UR: ABNORMAL
COLOR UR: YELLOW
GLUCOSE UR STRIP-MCNC: NEGATIVE MG/DL
HGB UR QL STRIP.AUTO: ABNORMAL
KETONES UR STRIP-MCNC: NEGATIVE MG/DL
LEUKOCYTE ESTERASE UR QL STRIP: ABNORMAL
NITRITE UR QL STRIP: NEGATIVE
NON-SQ EPI CELLS URNS QL MICRO: ABNORMAL /HPF
PH UR STRIP.AUTO: 6 [PH]
PROT UR STRIP-MCNC: ABNORMAL MG/DL
RBC #/AREA URNS AUTO: ABNORMAL /HPF
SL AMB  POCT GLUCOSE, UA: NEGATIVE
SL AMB LEUKOCYTE ESTERASE,UA: ABNORMAL
SL AMB POCT BILIRUBIN,UA: NEGATIVE
SL AMB POCT BLOOD,UA: ABNORMAL
SL AMB POCT CLARITY,UA: ABNORMAL
SL AMB POCT COLOR,UA: YELLOW
SL AMB POCT KETONES,UA: NEGATIVE
SL AMB POCT NITRITE,UA: NEGATIVE
SL AMB POCT PH,UA: 6
SL AMB POCT SPECIFIC GRAVITY,UA: 1.03
SL AMB POCT URINE PROTEIN: ABNORMAL
SL AMB POCT UROBILINOGEN: NEGATIVE
SP GR UR STRIP.AUTO: 1.02 (ref 1–1.03)
UROBILINOGEN UR STRIP-ACNC: <2 MG/DL
WBC #/AREA URNS AUTO: ABNORMAL /HPF
WBC CLUMPS # UR AUTO: PRESENT /UL

## 2023-04-03 RX ORDER — CEFPODOXIME PROXETIL 100 MG/1
100 TABLET, FILM COATED ORAL 2 TIMES DAILY
Qty: 20 TABLET | Refills: 0 | Status: SHIPPED | OUTPATIENT
Start: 2023-04-03 | End: 2023-04-13

## 2023-04-03 NOTE — TELEPHONE ENCOUNTER
"  Reason for Disposition  • Urinating more frequently than usual (i e , frequency)    Answer Assessment - Initial Assessment Questions  1  SYMPTOM: \"What's the main symptom you're concerned about? \" (e g , frequency, incontinence)       Frequency, incontinence, pressure in a bladder, burning with urination  2  ONSET: \"When did the urinary symptoms  start? \"      3 days ago   3  PAIN: \"Is there any pain? \" If Yes, ask: \"How bad is it? \" (Scale: 1-10; mild, moderate, severe)      Pressure in a bladder, 4-5 out of 10   4  CAUSE: \"What do you think is causing the symptoms? \"      Possible UTI  5  OTHER SYMPTOMS: \"Do you have any other symptoms? \" (e g , fever, flank pain, blood in urine, pain with urination)      Burning with urination  6  PREGNANCY: \"Is there any chance you are pregnant? \" \"When was your last menstrual period? \"      N/A    Protocols used: URINARY SYMPTOMS-ADULT-[place]    "

## 2023-04-03 NOTE — ASSESSMENT & PLAN NOTE
Patient has 23 allergies listed, she reports she has tolerated this cefpodoxime in the past   We will start this, and send urine for culture  Pt requests 10 days of treatment

## 2023-04-03 NOTE — TELEPHONE ENCOUNTER
"Regarding: UTI  ----- Message from Brent Serra RN sent at 4/3/2023  8:59 AM EDT -----  \"I have a UTI since Friday  I have pressure  I called the emergency number and they told me to go to a [facility]  I didn't go  I took azo  It is a little better but I still have to wear 2 pads because I can't control it  Dr Castrejon usually gives me cefpodoxime  \"    "

## 2023-04-03 NOTE — PATIENT INSTRUCTIONS
Problem List Items Addressed This Visit          Cardiovascular and Mediastinum    Atrial fibrillation ([facility] [address] )    Thromboembolic disorder ([facility] [address] )       Other    UTI symptoms - Primary     Patient has 23 allergies listed, she reports she has tolerated this cefpodoxime in the past   We will start this, and send urine for culture  Pt requests 10 days of treatment           Relevant Medications    cefpodoxime (VANTIN) 100 mg tablet

## 2023-04-05 LAB
BACTERIA UR CULT: ABNORMAL
BACTERIA UR CULT: ABNORMAL

## 2023-04-24 ENCOUNTER — OFFICE VISIT (OUTPATIENT)
Dept: INTERNAL MEDICINE CLINIC | Facility: CLINIC | Age: 88
End: 2023-04-24

## 2023-04-24 VITALS
RESPIRATION RATE: 16 BRPM | HEIGHT: 61 IN | WEIGHT: 187.2 LBS | OXYGEN SATURATION: 97 % | BODY MASS INDEX: 35.34 KG/M2 | DIASTOLIC BLOOD PRESSURE: 66 MMHG | HEART RATE: 63 BPM | SYSTOLIC BLOOD PRESSURE: 126 MMHG

## 2023-04-24 DIAGNOSIS — E78.2 MIXED HYPERLIPIDEMIA: ICD-10-CM

## 2023-04-24 DIAGNOSIS — N39.46 MIXED STRESS AND URGE URINARY INCONTINENCE: Primary | ICD-10-CM

## 2023-04-24 DIAGNOSIS — L60.9 NAIL DISORDER: ICD-10-CM

## 2023-04-24 DIAGNOSIS — I48.91 ATRIAL FIBRILLATION, UNSPECIFIED TYPE (HCC): ICD-10-CM

## 2023-04-24 NOTE — PROGRESS NOTES
"Assessment/Plan:  Continue current meds  Consult with urogyn placed       Problem List Items Addressed This Visit        Cardiovascular and Mediastinum    Atrial fibrillation ([address] )    Relevant Orders    CBC and differential    Comprehensive metabolic panel       Other    Hyperlipidemia    Relevant Orders    Comprehensive metabolic panel    Lipid Panel with Direct LDL reflex    Mixed stress and urge urinary incontinence - Primary    Relevant Orders    Ambulatory Referral to Urogynecology   Other Visit Diagnoses     Nail disorder        Relevant Orders    Ambulatory Referral to Podiatry            Subjective:      Patient ID: Ana He is a 80 y o  female  HPI  Here with her daughter  Requesting for a urologist due to incontinence  Recurrent UTIs  Also asking for a podiatrist for nail care  SOB with exertion, needs to rest   Recent labs reviewed and unremarkable    The following portions of the patient's history were reviewed and updated as appropriate: allergies, current medications, past family history, past medical history, past social history, past surgical history and problem list     Review of Systems   Respiratory: Positive for cough and shortness of breath  Cardiovascular: Negative for chest pain, palpitations and leg swelling  Gastrointestinal: Negative for constipation and diarrhea  Genitourinary: Positive for urgency  Negative for dysuria, hematuria and pelvic pain  Musculoskeletal: Positive for back pain  Skin:        Itchy lesion on her back and itchy legs   Neurological: Negative for dizziness and headaches  Objective:      /66 (BP Location: Left arm, Patient Position: Sitting, Cuff Size: Large)   Pulse 63   Resp 16   Ht 5' 1\" (1 549 m)   Wt 84 9 kg (187 lb 3 2 oz)   SpO2 97%   BMI 35 37 kg/m²          Physical Exam  Constitutional:       General: She is not in acute distress  Appearance: She is well-developed  She is obese   She is not ill-appearing or " diaphoretic  Eyes:      Conjunctiva/sclera: Conjunctivae normal    Cardiovascular:      Rate and Rhythm: Normal rate and regular rhythm  Heart sounds: Normal heart sounds  No murmur heard  Pulmonary:      Effort: Pulmonary effort is normal  No respiratory distress  Breath sounds: Normal breath sounds  No wheezing or rales  Abdominal:      General: There is no distension  Palpations: Abdomen is soft  There is no mass  Tenderness: There is no abdominal tenderness  There is no guarding or rebound  Musculoskeletal:      Cervical back: Neck supple  Right lower leg: Edema present  Left lower leg: Edema present  Skin:     General: Skin is dry  Findings: Lesion (crusty brown lesions on her back) present  Neurological:      Mental Status: She is alert and oriented to person, place, and time  Psychiatric:         Mood and Affect: Mood normal          Behavior: Behavior normal          Thought Content:  Thought content normal          Judgment: Judgment normal

## 2023-04-26 DIAGNOSIS — I48.91 ATRIAL FIBRILLATION WITH RAPID VENTRICULAR RESPONSE (HCC): ICD-10-CM

## 2023-04-26 RX ORDER — RIVAROXABAN 20 MG/1
TABLET, FILM COATED ORAL
Qty: 30 TABLET | Refills: 2 | Status: SHIPPED | OUTPATIENT
Start: 2023-04-26

## 2023-04-28 DIAGNOSIS — E78.2 MIXED HYPERLIPIDEMIA: ICD-10-CM

## 2023-04-28 RX ORDER — SIMVASTATIN 40 MG
TABLET ORAL
Qty: 90 TABLET | Refills: 1 | Status: SHIPPED | OUTPATIENT
Start: 2023-04-28

## 2023-04-29 PROBLEM — R07.9 CHEST PAIN SYNDROME: Status: RESOLVED | Noted: 2022-12-01 | Resolved: 2023-04-29

## 2023-04-29 PROBLEM — R06.09 DYSPNEA ON EXERTION: Status: RESOLVED | Noted: 2022-12-01 | Resolved: 2023-04-29

## 2023-04-29 PROBLEM — N39.46 MIXED STRESS AND URGE URINARY INCONTINENCE: Status: ACTIVE | Noted: 2023-04-29

## 2023-04-29 PROBLEM — N39.3 STRESS INCONTINENCE OF URINE: Status: RESOLVED | Noted: 2020-08-11 | Resolved: 2023-04-29

## 2023-04-29 PROBLEM — R39.9 UTI SYMPTOMS: Status: RESOLVED | Noted: 2023-04-03 | Resolved: 2023-04-29

## 2023-06-15 ENCOUNTER — TELEPHONE (OUTPATIENT)
Dept: INTERNAL MEDICINE CLINIC | Facility: CLINIC | Age: 88
End: 2023-06-15

## 2023-06-15 DIAGNOSIS — R39.9 UTI SYMPTOMS: Primary | ICD-10-CM

## 2023-06-15 RX ORDER — CEFPODOXIME PROXETIL 100 MG/1
100 TABLET, FILM COATED ORAL 2 TIMES DAILY
Qty: 14 TABLET | Refills: 0 | Status: SHIPPED | OUTPATIENT
Start: 2023-06-15 | End: 2023-07-10 | Stop reason: SDUPTHER

## 2023-06-15 NOTE — TELEPHONE ENCOUNTER
Patient's urine is cloudy and she is having frequency  Started Sunday night  Symptoms are worsening  Patient is asking if you can call something into the pharmacy for her  [id number]        Please advise

## 2023-06-20 ENCOUNTER — OFFICE VISIT (OUTPATIENT)
Dept: PODIATRY | Facility: CLINIC | Age: 88
End: 2023-06-20
Payer: COMMERCIAL

## 2023-06-20 VITALS
WEIGHT: 181.8 LBS | BODY MASS INDEX: 34.32 KG/M2 | HEART RATE: 71 BPM | DIASTOLIC BLOOD PRESSURE: 80 MMHG | HEIGHT: 61 IN | SYSTOLIC BLOOD PRESSURE: 128 MMHG

## 2023-06-20 DIAGNOSIS — I73.9 PERIPHERAL VASCULAR DISEASE, UNSPECIFIED (HCC): Primary | ICD-10-CM

## 2023-06-20 DIAGNOSIS — L85.1 ACQUIRED KERATODERMA: ICD-10-CM

## 2023-06-20 DIAGNOSIS — B35.1 ONYCHOMYCOSIS: ICD-10-CM

## 2023-06-20 PROCEDURE — 11056 PARNG/CUTG B9 HYPRKR LES 2-4: CPT | Performed by: PODIATRIST

## 2023-06-20 PROCEDURE — 11721 DEBRIDE NAIL 6 OR MORE: CPT | Performed by: PODIATRIST

## 2023-06-20 NOTE — PROGRESS NOTES
PATIENT:  Sherin Hoyt  1935    ASSESSMENT/PLAN:  1  Peripheral vascular disease, unspecified ([facility] [address] )        2  Onychomycosis  Ambulatory Referral to Podiatry    Debridement      3  Acquired keratoderma  Lesion Destruction            Orders Placed This Encounter   Procedures   • Debridement   • Lesion Destruction      The patient was educated in proper foot wear  Also discussed daily foot assessment and proper foot care  The patient will follow up in 9 weeks for foot exam and care  PROCEDURE:  All mycotic toenails were reduced and debrided in length, width, and girth using a nail nipper and dremel  All hyperkeratotic skin lesion(s) were sharply pared with a scalpel / forcep with no bleeding or evidence of ulceration  Patient tolerated procedure(s) well without complications  HPI:  Sherin Hoyt is a 80 y  o year old female seen for at risk foot exam and care  The patient has class findings  The patient complained of thick toenails and calluses  The patient denied any acute pedal disorder, redness, acute swelling, or recent injury            PAST MEDICAL HISTORY:  Past Medical History:   Diagnosis Date   • Acute bronchitis 3/17/2022   • Ambulatory dysfunction 10/17/2016   • Arthritis    • Cardiac disease    • Chest pain syndrome 12/1/2022   • Dyspnea on exertion 12/1/2022   • Hyperlipidemia    • Hypertension    • Hypertension, essential, benign 11/6/2018   • Ingrowing toenail with infection 9/26/2019   • Preop cardiovascular exam 11/5/2019   • Rash 6/3/2020   • Stress incontinence of urine 8/11/2020   • Thrombocytopenia ([facility] [address] ) 11/10/2021    Added per ICD-10 guidelines--provider accepted   • UTI (urinary tract infection) 5/31/2017   • UTI symptoms 4/3/2023       PAST SURGICAL HISTORY:  Past Surgical History:   Procedure Laterality Date   • APPENDECTOMY     • CORONARY ANGIOPLASTY WITH STENT PLACEMENT     • CYSTOSCOPY  12/29/2014    Diagnostic   • HERNIA REPAIR     • JOINT REPLACEMENT      bilat knee, bilat hip, L shoulder   • KNEE SURGERY Bilateral    • SHOULDER SURGERY  2012   • TOTAL HIP ARTHROPLASTY Bilateral         ALLERGIES:  Cephalexin, Ciprofloxacin, Cortisone, Cortizone-10 [hydrocortisone], Cyclophosphamide, Dexamethasone, Erythromycin, Haemophilus b polysaccharide vaccine, Influenza virus vaccine, Iodinated casein, Loratadine, Macrobid  [nitrofurantoin monohyd macro], Motrin [ibuprofen], Mucinex chest congestion child [guaifenesin], Niacin, Nitrofurantoin, Omeprazole, Oxycodone-acetaminophen, Penicillins, Percocet  [oxycodone-acetaminophen], Pneumococcal vac polyvalent, Povidone iodine, and Sulfa antibiotics    MEDICATIONS:  Current Outpatient Medications   Medication Sig Dispense Refill   • acetaminophen (TYLENOL) 650 mg CR tablet Take by mouth     • Ascorbic Acid (VITAMIN C) 1000 MG tablet Take 1,000 mg by mouth daily     • aspirin 81 MG tablet Take 81 mg by mouth daily     • calcium citrate-vitamin D (CITRACAL+D) 315-200 MG-UNIT per tablet Take 1 tablet by mouth 2 (two) times a day     • cefpodoxime (VANTIN) 100 mg tablet Take 1 tablet (100 mg total) by mouth 2 (two) times a day for 7 days 14 tablet 0   • Cholecalciferol (VITAMIN D3) 1000 units CAPS Take 1 tablet by mouth daily     • famotidine (PEPCID) 20 mg tablet TAKE 1 TABLET(20 MG) BY MOUTH DAILY 90 tablet 1   • metoprolol tartrate (LOPRESSOR) 25 mg tablet TAKE 1 TABLET(25 MG) BY MOUTH EVERY 12 HOURS 180 tablet 1   • Multiple Vitamins-Minerals (CENTRUM ADULTS PO) Take 1 tablet by mouth daily     • nitroglycerin (NITROSTAT) 0 4 mg SL tablet PLACE 1 TABLET UNDER THE TONGUE EVERY 5 MINUTES AS NEEDED FOR CHEST PAIN UP TO 3 DOSES  CALL 911 IF PAIN PERSISTS 25 tablet 1   • simvastatin (ZOCOR) 40 mg tablet TAKE 1 TABLET(40 MG) BY MOUTH DAILY 90 tablet 1   • Xarelto 20 MG tablet TAKE 1 TABLET(20 MG) BY MOUTH DAILY WITH BREAKFAST 30 tablet 2     No current facility-administered medications for this visit         SOCIAL HISTORY:  Social History "    Socioeconomic History   • Marital status:      Spouse name: None   • Number of children: 4   • Years of education: None   • Highest education level: None   Occupational History   • Occupation: Retired   Tobacco Use   • Smoking status: Never     Passive exposure: Never   • Smokeless tobacco: Never   Vaping Use   • Vaping Use: Never used   Substance and Sexual Activity   • Alcohol use: Not Currently   • Drug use: No   • Sexual activity: Not Currently   Other Topics Concern   • None   Social History Narrative    Caffeine use, active    Completed 8th grade    No caffeine use, active, per [employer]     Social Determinants of Health     Financial Resource Strain: Not on file   Food Insecurity: Not on file   Transportation Needs: Not on file   Physical Activity: Not on file   Stress: Not on file   Social Connections: Not on file   Intimate Partner Violence: Not on file   Housing Stability: Not on file        REVIEW OF SYSTEMS:  GENERAL: No fever or chills  HEART: No chest pain, or palpitation  RESPIRATORY:  No acute SOB or cough  GI: No Nausea, vomit or diarrhea  NEUROLOGIC: No syncope or acute weakness    PHYSICAL EXAM:    /80   Pulse 71   Ht 5' 1\" (1 549 m)   Wt 82 5 kg (181 lb 12 8 oz)   BMI 34 35 kg/m²     VASCULAR EXAM  Dorsalis pedis  absent, Posterior tibial artery  absent  The patient has class findings with skin atrophy, lack of digital hair, and nail dystrophy  There is +1 and trace lower extremity edema bilaterally  NEUROLOGIC EXAM  Sensation is intact to light touch  No focal neurologic deficit  DERMATOLOGIC EXAM:   No ulcer or cellulitis noted  The patient has hypertrophic toenails X 6 with discoloration, onycholysis, and subungal debris  No notable skin lesion  Patient has hyperkeratosis X 2 in bilateral 1st met head  MUSCULOSKELETAL EXAM:   No acute joint pain, edema, or redness  No acute musculoskeletal problem  Patient has deformity including hammertoes     "

## 2023-07-10 ENCOUNTER — TELEPHONE (OUTPATIENT)
Dept: INTERNAL MEDICINE CLINIC | Facility: CLINIC | Age: 88
End: 2023-07-10

## 2023-07-10 DIAGNOSIS — R39.9 UTI SYMPTOMS: ICD-10-CM

## 2023-07-10 RX ORDER — CEFPODOXIME PROXETIL 100 MG/1
100 TABLET, FILM COATED ORAL 2 TIMES DAILY
Qty: 14 TABLET | Refills: 0 | Status: SHIPPED | OUTPATIENT
Start: 2023-07-10 | End: 2023-07-17

## 2023-07-10 NOTE — TELEPHONE ENCOUNTER
Pt called stating she has had a UTI since Thursday of last week. 7/6. Pt has tried the over the counter Azo but has had no relief. Pt is asking if something could be sent to her Pharmacy   She said she has a lot of urgency and burning.

## 2023-07-27 DIAGNOSIS — K21.9 GASTROESOPHAGEAL REFLUX DISEASE: ICD-10-CM

## 2023-07-27 RX ORDER — FAMOTIDINE 20 MG/1
TABLET, FILM COATED ORAL
Qty: 90 TABLET | Refills: 1 | Status: SHIPPED | OUTPATIENT
Start: 2023-07-27

## 2023-08-04 DIAGNOSIS — I48.91 ATRIAL FIBRILLATION WITH RAPID VENTRICULAR RESPONSE (HCC): ICD-10-CM

## 2023-08-04 RX ORDER — RIVAROXABAN 20 MG/1
TABLET, FILM COATED ORAL
Qty: 30 TABLET | Refills: 2 | Status: SHIPPED | OUTPATIENT
Start: 2023-08-04

## 2023-08-21 DIAGNOSIS — I48.91 ATRIAL FIBRILLATION WITH RAPID VENTRICULAR RESPONSE (HCC): ICD-10-CM

## 2023-08-22 ENCOUNTER — OFFICE VISIT (OUTPATIENT)
Dept: PODIATRY | Facility: CLINIC | Age: 88
End: 2023-08-22
Payer: COMMERCIAL

## 2023-08-22 VITALS
HEART RATE: 66 BPM | BODY MASS INDEX: 33.99 KG/M2 | HEIGHT: 61 IN | WEIGHT: 180 LBS | SYSTOLIC BLOOD PRESSURE: 151 MMHG | DIASTOLIC BLOOD PRESSURE: 74 MMHG

## 2023-08-22 DIAGNOSIS — B35.1 ONYCHOMYCOSIS: ICD-10-CM

## 2023-08-22 DIAGNOSIS — L85.1 ACQUIRED KERATODERMA: ICD-10-CM

## 2023-08-22 DIAGNOSIS — I73.9 PERIPHERAL VASCULAR DISEASE, UNSPECIFIED (HCC): Primary | ICD-10-CM

## 2023-08-22 PROCEDURE — 11056 PARNG/CUTG B9 HYPRKR LES 2-4: CPT | Performed by: PODIATRIST

## 2023-08-22 PROCEDURE — 11721 DEBRIDE NAIL 6 OR MORE: CPT | Performed by: PODIATRIST

## 2023-08-22 NOTE — PROGRESS NOTES
PATIENT:  Figueroa Lawrence  1935    ASSESSMENT/PLAN:  1. Peripheral vascular disease, unspecified ([address])        2. Onychomycosis  Debridement      3. Acquired keratoderma  Lesion Destruction            Orders Placed This Encounter   Procedures   • Debridement   • Lesion Destruction      The patient was educated in proper foot wear. Also discussed daily foot assessment and proper foot care. The patient will follow up in 9 weeks for foot exam and care. PROCEDURE:  All mycotic toenails were reduced and debrided in length, width, and girth using a nail nipper and dremel. All hyperkeratotic skin lesion(s) were sharply pared with a scalpel / forcep with no bleeding or evidence of ulceration. Patient tolerated procedure(s) well without complications. HPI:  Figueroa Lawrence is a 80 y. o.year old female seen for at risk foot exam and care. The patient has class findings. The patient complained of thick toenails and calluses. The patient denied any acute pedal disorder.     PAST MEDICAL HISTORY:  Past Medical History:   Diagnosis Date   • Acute bronchitis 3/17/2022   • Ambulatory dysfunction 10/17/2016   • Arthritis    • Cardiac disease    • Chest pain syndrome 12/1/2022   • Dyspnea on exertion 12/1/2022   • Hyperlipidemia    • Hypertension    • Hypertension, essential, benign 11/6/2018   • Ingrowing toenail with infection 9/26/2019   • Preop cardiovascular exam 11/5/2019   • Rash 6/3/2020   • Stress incontinence of urine 8/11/2020   • Thrombocytopenia ([address]) 11/10/2021    Added per ICD-10 guidelines--provider accepted   • UTI (urinary tract infection) 5/31/2017   • UTI symptoms 4/3/2023       PAST SURGICAL HISTORY:  Past Surgical History:   Procedure Laterality Date   • APPENDECTOMY     • CORONARY ANGIOPLASTY WITH STENT PLACEMENT     • CYSTOSCOPY  12/29/2014    Diagnostic   • HERNIA REPAIR     • JOINT REPLACEMENT      bilat knee, bilat hip, L shoulder   • KNEE SURGERY Bilateral    • SHOULDER SURGERY  2012 • TOTAL HIP ARTHROPLASTY Bilateral         ALLERGIES:  Cephalexin, Ciprofloxacin, Cortisone, Cortizone-10 [hydrocortisone], Cyclophosphamide, Dexamethasone, Erythromycin, Haemophilus b polysaccharide vaccine, Influenza virus vaccine, Iodinated casein, Loratadine, Macrobid  [nitrofurantoin monohyd macro], Motrin [ibuprofen], Mucinex chest congestion child [guaifenesin], Niacin, Nitrofurantoin, Omeprazole, Oxycodone-acetaminophen, Penicillins, Percocet  [oxycodone-acetaminophen], Pneumococcal vac polyvalent, Povidone iodine, and Sulfa antibiotics    MEDICATIONS:  Current Outpatient Medications   Medication Sig Dispense Refill   • acetaminophen (TYLENOL) 650 mg CR tablet Take by mouth     • Ascorbic Acid (VITAMIN C) 1000 MG tablet Take 1,000 mg by mouth daily     • aspirin 81 MG tablet Take 81 mg by mouth daily     • calcium citrate-vitamin D (CITRACAL+D) 315-200 MG-UNIT per tablet Take 1 tablet by mouth 2 (two) times a day     • Cholecalciferol (VITAMIN D3) 1000 units CAPS Take 1 tablet by mouth daily     • famotidine (PEPCID) 20 mg tablet TAKE 1 TABLET(20 MG) BY MOUTH DAILY 90 tablet 1   • metoprolol tartrate (LOPRESSOR) 25 mg tablet TAKE 1 TABLET(25 MG) BY MOUTH EVERY 12 HOURS 180 tablet 1   • Multiple Vitamins-Minerals (CENTRUM ADULTS PO) Take 1 tablet by mouth daily     • nitroglycerin (NITROSTAT) 0.4 mg SL tablet PLACE 1 TABLET UNDER THE TONGUE EVERY 5 MINUTES AS NEEDED FOR CHEST PAIN UP TO 3 DOSES. CALL 911 IF PAIN PERSISTS 25 tablet 1   • simvastatin (ZOCOR) 40 mg tablet TAKE 1 TABLET(40 MG) BY MOUTH DAILY 90 tablet 1   • Xarelto 20 MG tablet TAKE 1 TABLET(20 MG) BY MOUTH DAILY WITH BREAKFAST 30 tablet 2     No current facility-administered medications for this visit.        SOCIAL HISTORY:  Social History     Socioeconomic History   • Marital status:      Spouse name: None   • Number of children: 4   • Years of education: None   • Highest education level: None   Occupational History   • Occupation: Retired   Tobacco Use   • Smoking status: Never     Passive exposure: Never   • Smokeless tobacco: Never   Vaping Use   • Vaping Use: Never used   Substance and Sexual Activity   • Alcohol use: Not Currently   • Drug use: No   • Sexual activity: Not Currently   Other Topics Concern   • None   Social History Narrative    Caffeine use, active    Completed 8th grade    No caffeine use, active, per [employer]     Social Determinants of Health     Financial Resource Strain: Not on file   Food Insecurity: Not on file   Transportation Needs: Not on file   Physical Activity: Not on file   Stress: Not on file   Social Connections: Not on file   Intimate Partner Violence: Not on file   Housing Stability: Not on file        REVIEW OF SYSTEMS:  GENERAL: No fever or chills  HEART: No chest pain, or palpitation  RESPIRATORY:  No acute SOB or cough  GI: No Nausea, vomit or diarrhea  NEUROLOGIC: No syncope or acute weakness    PHYSICAL EXAM:    /74   Pulse 66   Ht 5' 1" (1.549 m)   Wt 81.6 kg (180 lb)   BMI 34.01 kg/m²     VASCULAR EXAM  Dorsalis pedis  absent, Posterior tibial artery  absent. The patient has class findings with skin atrophy, lack of digital hair, and nail dystrophy. There is +1 and trace lower extremity edema bilaterally. NEUROLOGIC EXAM  Sensation is intact to light touch. No focal neurologic deficit. DERMATOLOGIC EXAM:   No ulcer or cellulitis noted. The patient has hypertrophic toenails X 6 with discoloration, onycholysis, and subungal debris. No notable skin lesion. Patient has hyperkeratosis X 2 in bilateral 1st met head. MUSCULOSKELETAL EXAM:   No acute joint pain, edema, or redness. No acute musculoskeletal problem. Patient has deformity including hammertoes.

## 2023-08-31 ENCOUNTER — TELEPHONE (OUTPATIENT)
Dept: INTERNAL MEDICINE CLINIC | Facility: CLINIC | Age: 88
End: 2023-08-31

## 2023-08-31 DIAGNOSIS — R39.9 UTI SYMPTOMS: Primary | ICD-10-CM

## 2023-08-31 RX ORDER — CEFPODOXIME PROXETIL 100 MG/1
100 TABLET, FILM COATED ORAL 2 TIMES DAILY
Qty: 14 TABLET | Refills: 0 | Status: SHIPPED | OUTPATIENT
Start: 2023-08-31 | End: 2023-08-31 | Stop reason: SDUPTHER

## 2023-08-31 RX ORDER — CEFPODOXIME PROXETIL 100 MG/1
100 TABLET, FILM COATED ORAL 2 TIMES DAILY
Qty: 20 TABLET | Refills: 0 | Status: SHIPPED | OUTPATIENT
Start: 2023-08-31 | End: 2023-09-10

## 2023-08-31 NOTE — TELEPHONE ENCOUNTER
[address] [facility] 2050 to her that it is just a matter of time the bacteria can be resistant to the antibiotic since she needs it frequently so a longer course is not necessarily good.

## 2023-08-31 NOTE — TELEPHONE ENCOUNTER
Patient calling in requesting medication for UTI please be sent into the pharmacy, started 2 nights ago with urinary frequency and pressure, no burning.

## 2023-10-09 ENCOUNTER — NURSE TRIAGE (OUTPATIENT)
Age: 88
End: 2023-10-09

## 2023-10-09 ENCOUNTER — TELEMEDICINE (OUTPATIENT)
Dept: INTERNAL MEDICINE CLINIC | Facility: CLINIC | Age: 88
End: 2023-10-09
Payer: COMMERCIAL

## 2023-10-09 DIAGNOSIS — J06.9 VIRAL UPPER RESPIRATORY TRACT INFECTION: Primary | ICD-10-CM

## 2023-10-09 PROCEDURE — 99213 OFFICE O/P EST LOW 20 MIN: CPT | Performed by: INTERNAL MEDICINE

## 2023-10-09 RX ORDER — BENZONATATE 100 MG/1
100 CAPSULE ORAL 3 TIMES DAILY PRN
Qty: 20 CAPSULE | Refills: 0 | Status: SHIPPED | OUTPATIENT
Start: 2023-10-09

## 2023-10-09 NOTE — PROGRESS NOTES
Virtual Regular Visit    Verification of patient location:    Patient is located at Other in the following state in which I hold an active license [place]      Assessment/Plan:  Suspected viral URI with 3 days of symptoms, COVID negative  Advised to continue symptom directed treatment  May try Tessalon Perles for the cough  Call if not improving  Problem List Items Addressed This Visit    None  Visit Diagnoses     Viral upper respiratory tract infection    -  Primary    Relevant Medications    benzonatate (TESSALON PERLES) 100 mg capsule               Reason for visit is   Chief Complaint   Patient presents with   • Virtual Regular Visit        Encounter provider Guadlupe Closs, MD    Provider located at [address] [place] [id number]      Recent Visits  No visits were found meeting these conditions. Showing recent visits within past 7 days and meeting all other requirements  Today's Visits  Date Type Provider Dept   10/09/23 Telemedicine Guadlupe Closs, MD [address] [facility] today's visits and meeting all other requirements  Future Appointments  No visits were found meeting these conditions. Showing future appointments within next 150 days and meeting all other requirements       The patient was identified by name and date of birth. Billy Greene was informed that this is a telemedicine visit and that the visit is being conducted through Telephone. My office door was closed. No one else was in the room. She acknowledged consent and understanding of privacy and security of the video platform. The patient has agreed to participate and understands they can discontinue the visit at any time. Patient is aware this is a billable service. Subjective  Billy Greene is a 80 y.o. female with a cough . Cough and SOB, chills started 3 days ago  Cough is dry. She denies having a sore throat. She has nasal and chest congestion.   More SOB with exertion .  She feels SOB when she rolls to her side in bed  She is taking Robitussin DM  She was at [employer] at the time of the call and did not sound SOB  I called her back later in the day and she tested negative for COVID         Past Medical History:   Diagnosis Date   • Acute bronchitis 3/17/2022   • Ambulatory dysfunction 10/17/2016   • Arthritis    • Cardiac disease    • Chest pain syndrome 12/1/2022   • Dyspnea on exertion 12/1/2022   • Hyperlipidemia    • Hypertension    • Hypertension, essential, benign 11/6/2018   • Ingrowing toenail with infection 9/26/2019   • Preop cardiovascular exam 11/5/2019   • Rash 6/3/2020   • Stress incontinence of urine 8/11/2020   • Thrombocytopenia ([address]) 11/10/2021    Added per ICD-10 guidelines--provider accepted   • UTI (urinary tract infection) 5/31/2017   • UTI symptoms 4/3/2023       Past Surgical History:   Procedure Laterality Date   • APPENDECTOMY     • CORONARY ANGIOPLASTY WITH STENT PLACEMENT     • CYSTOSCOPY  12/29/2014    Diagnostic   • HERNIA REPAIR     • JOINT REPLACEMENT      bilat knee, bilat hip, L shoulder   • KNEE SURGERY Bilateral    • SHOULDER SURGERY  2012   • TOTAL HIP ARTHROPLASTY Bilateral        Current Outpatient Medications   Medication Sig Dispense Refill   • benzonatate (TESSALON PERLES) 100 mg capsule Take 1 capsule (100 mg total) by mouth 3 (three) times a day as needed for cough 20 capsule 0   • acetaminophen (TYLENOL) 650 mg CR tablet Take by mouth     • Ascorbic Acid (VITAMIN C) 1000 MG tablet Take 1,000 mg by mouth daily     • aspirin 81 MG tablet Take 81 mg by mouth daily     • calcium citrate-vitamin D (CITRACAL+D) 315-200 MG-UNIT per tablet Take 1 tablet by mouth 2 (two) times a day     • Cholecalciferol (VITAMIN D3) 1000 units CAPS Take 1 tablet by mouth daily     • famotidine (PEPCID) 20 mg tablet TAKE 1 TABLET(20 MG) BY MOUTH DAILY 90 tablet 1   • metoprolol tartrate (LOPRESSOR) 25 mg tablet TAKE 1 TABLET(25 MG) BY MOUTH EVERY 12 HOURS 180 tablet 1   • Multiple Vitamins-Minerals (CENTRUM ADULTS PO) Take 1 tablet by mouth daily     • nitroglycerin (NITROSTAT) 0.4 mg SL tablet PLACE 1 TABLET UNDER THE TONGUE EVERY 5 MINUTES AS NEEDED FOR CHEST PAIN UP TO 3 DOSES. CALL 911 IF PAIN PERSISTS 25 tablet 1   • simvastatin (ZOCOR) 40 mg tablet TAKE 1 TABLET(40 MG) BY MOUTH DAILY 90 tablet 1   • Xarelto 20 MG tablet TAKE 1 TABLET(20 MG) BY MOUTH DAILY WITH BREAKFAST 30 tablet 2     No current facility-administered medications for this visit. Allergies   Allergen Reactions   • Cephalexin Hives   • Ciprofloxacin Hives   • Cortisone Headache   • Cortizone-10 [Hydrocortisone] Headache   • Cyclophosphamide Hives   • Dexamethasone Hives   • Erythromycin Hives   • Haemophilus B Polysaccharide Vaccine Hives   • Influenza Virus Vaccine Hives   • Iodinated Casein    • Loratadine    • Macrobid  [Nitrofurantoin Monohyd Macro] Hives   • Motrin [Ibuprofen] Itching   • Mucinex Chest Congestion Child [Guaifenesin] Itching   • Niacin Itching   • Nitrofurantoin    • Omeprazole    • Oxycodone-Acetaminophen Other (See Comments)   • Penicillins    • Percocet  [Oxycodone-Acetaminophen]    • Pneumococcal Vac Polyvalent Hives   • Povidone Iodine    • Sulfa Antibiotics        Review of Systems   Constitutional: Positive for chills. Negative for fever. HENT: Positive for congestion. Negative for sore throat. Respiratory: Positive for cough and shortness of breath. Cardiovascular: Negative for chest pain. Gastrointestinal: Negative for diarrhea and vomiting. Musculoskeletal: Positive for myalgias. Video Exam    There were no vitals filed for this visit.     Physical Exam     Visit Time  Total Visit Duration: 15mins

## 2023-10-09 NOTE — TELEPHONE ENCOUNTER
Regarding: triage  ----- Message from Thania Khan sent at 10/9/2023 11:15 AM EDT -----  Pt c/o a cough for a week. She does have sob. She is concerned for pneumonia but would not take any appt until Friday. Please, triage to make sure okay to wait.  Thank you,

## 2023-10-09 NOTE — TELEPHONE ENCOUNTER
Patient has fu OV with PCP for Friday 10/13 for cough. Patient also reports shortness of breath; mild but can be moderate with activity. Patient reports history of atrial fibrillation, pneumonia, and bronchitis. Please follow up with patient for provider care advise, orders, or change OV to earlier date. Reason for Disposition   MILD difficulty breathing (e.g., minimal/no SOB at rest, SOB with walking, pulse < 100) of new-onset or worse than normal    Answer Assessment - Initial Assessment Questions  1. RESPIRATORY STATUS: "Describe your breathing?" (e.g., wheezing, shortness of breath, unable to speak, severe coughing)       Shortness of breath; hard dry cough; cuts breah off when repositioning in bed  2. ONSET: "When did this breathing problem begin?"       Friday 10/6  3. PATTERN "Does the difficult breathing come and go, or has it been constant since it started?"       Intermittent  4. SEVERITY: "How bad is your breathing?" (e.g., mild, moderate, severe)     - MILD: No SOB at rest, mild SOB with walking, speaks normally in sentences, can lay down, no retractions, pulse < 100.     - MODERATE: SOB at rest, SOB with minimal exertion and prefers to sit, cannot lie down flat, speaks in phrases, mild retractions, audible wheezing, pulse 100-120.     - SEVERE: Very SOB at rest, speaks in single words, struggling to breathe, sitting hunched forward, retractions, pulse > 120       Mild at time of call; moderated with activity  5. RECURRENT SYMPTOM: "Have you had difficulty breathing before?" If Yes, ask: "When was the last time?" and "What happened that time?"       Long ago  6. CARDIAC HISTORY: "Do you have any history of heart disease?" (e.g., heart attack, angina, bypass surgery, angioplasty)       Atrial fibrillation  7. LUNG HISTORY: "Do you have any history of lung disease?"  (e.g., pulmonary embolus, asthma, emphysema)      Pneumonia, bronchitis  8.  CAUSE: "What do you think is causing the breathing problem?" Cough is contributing to it; afraid she may be developing pneumonia  9. OTHER SYMPTOMS: "Do you have any other symptoms? (e.g., dizziness, runny nose, cough, chest pain, fever)      Cough, runny nose   10. PREGNANCY: "Is there any chance you are pregnant?" "When was your last menstrual period?"        Post menopausal  11.  TRAVEL: "Have you traveled out of the country in the last month?" (e.g., travel history, exposures)        Denies    Protocols used: Breathing Difficulty-ADULT-[place]

## 2023-10-13 ENCOUNTER — OFFICE VISIT (OUTPATIENT)
Dept: INTERNAL MEDICINE CLINIC | Facility: CLINIC | Age: 88
End: 2023-10-13
Payer: COMMERCIAL

## 2023-10-13 ENCOUNTER — HOSPITAL ENCOUNTER (OUTPATIENT)
Dept: RADIOLOGY | Facility: HOSPITAL | Age: 88
Discharge: HOME | End: 2023-10-13
Payer: COMMERCIAL

## 2023-10-13 VITALS
SYSTOLIC BLOOD PRESSURE: 110 MMHG | WEIGHT: 179.8 LBS | HEART RATE: 78 BPM | BODY MASS INDEX: 33.95 KG/M2 | HEIGHT: 61 IN | OXYGEN SATURATION: 99 % | DIASTOLIC BLOOD PRESSURE: 60 MMHG

## 2023-10-13 DIAGNOSIS — M54.9 MID BACK PAIN: ICD-10-CM

## 2023-10-13 DIAGNOSIS — J06.9 VIRAL UPPER RESPIRATORY TRACT INFECTION: ICD-10-CM

## 2023-10-13 DIAGNOSIS — R07.89 OTHER CHEST PAIN: ICD-10-CM

## 2023-10-13 DIAGNOSIS — M54.9 MID BACK PAIN: Primary | ICD-10-CM

## 2023-10-13 PROCEDURE — 72072 X-RAY EXAM THORAC SPINE 3VWS: CPT

## 2023-10-13 PROCEDURE — 93000 ELECTROCARDIOGRAM COMPLETE: CPT | Performed by: INTERNAL MEDICINE

## 2023-10-13 PROCEDURE — 99214 OFFICE O/P EST MOD 30 MIN: CPT | Performed by: INTERNAL MEDICINE

## 2023-10-13 PROCEDURE — 71111 X-RAY EXAM RIBS/CHEST4/> VWS: CPT

## 2023-10-13 RX ORDER — ALBUTEROL SULFATE 90 UG/1
2 AEROSOL, METERED RESPIRATORY (INHALATION) EVERY 6 HOURS PRN
Qty: 8.5 G | Refills: 0 | Status: SHIPPED | OUTPATIENT
Start: 2023-10-13

## 2023-10-13 NOTE — PROGRESS NOTES
Assessment/Plan:  Lungs clear on exam  Main complaint is back pain which appears musculoskeletal  Obtain Xrs  She experienced chest tightness so will try albuterol     Problem List Items Addressed This Visit    None  Visit Diagnoses     Mid back pain    -  Primary    Relevant Orders    XR spine thoracic 3 vw    Other chest pain        Relevant Orders    XR ribs bilateral 4+ vw w pa chest    POCT ECG (Completed)    Viral upper respiratory tract infection        Relevant Medications    albuterol (ProAir HFA) 90 mcg/act inhaler            Subjective:      Patient ID: Bree Fairchild is a 80 y.o. female. HPI  Televisit 4 days ago for a cough nasal and chest congestion  Cough is better but still with pain between the shoulder blades and heaviness in the chest. The pain is worse with movement   Denies fever chills sore throat nasal congestion  Chronic rhinorrhea    The following portions of the patient's history were reviewed and updated as appropriate: allergies, current medications, past family history, past medical history, past social history, past surgical history, and problem list.    Review of Systems   Constitutional:  Negative for fever. HENT:  Positive for rhinorrhea. Negative for congestion. Respiratory:  Positive for cough and chest tightness. Negative for shortness of breath and wheezing. Cardiovascular:  Positive for chest pain. Negative for palpitations. Gastrointestinal:  Negative for diarrhea, nausea and vomiting. Objective:      /60 (BP Location: Left arm, Patient Position: Sitting, Cuff Size: Large)   Pulse 78   Ht 5' 1" (1.549 m)   Wt 81.6 kg (179 lb 12.8 oz)   SpO2 99%   BMI 33.97 kg/m²          Physical Exam  Constitutional:       Appearance: She is not ill-appearing. Cardiovascular:      Rate and Rhythm: Normal rate and regular rhythm. Pulmonary:      Breath sounds: Normal breath sounds. No decreased breath sounds, wheezing, rhonchi or rales.    Chest:      Chest wall: No tenderness. Musculoskeletal:      Cervical back: Neck supple. Thoracic back: Tenderness and bony tenderness present.    Psychiatric:         Mood and Affect: Mood normal.         Behavior: Behavior normal.

## 2023-10-24 ENCOUNTER — OFFICE VISIT (OUTPATIENT)
Dept: PODIATRY | Facility: CLINIC | Age: 88
End: 2023-10-24
Payer: COMMERCIAL

## 2023-10-24 VITALS
DIASTOLIC BLOOD PRESSURE: 61 MMHG | WEIGHT: 179 LBS | HEIGHT: 61 IN | HEART RATE: 63 BPM | SYSTOLIC BLOOD PRESSURE: 149 MMHG | BODY MASS INDEX: 33.79 KG/M2

## 2023-10-24 DIAGNOSIS — B35.1 ONYCHOMYCOSIS: ICD-10-CM

## 2023-10-24 DIAGNOSIS — I73.9 PERIPHERAL VASCULAR DISEASE, UNSPECIFIED (HCC): Primary | ICD-10-CM

## 2023-10-24 DIAGNOSIS — L85.1 ACQUIRED KERATODERMA: ICD-10-CM

## 2023-10-24 PROCEDURE — 11056 PARNG/CUTG B9 HYPRKR LES 2-4: CPT | Performed by: PODIATRIST

## 2023-10-24 PROCEDURE — 11721 DEBRIDE NAIL 6 OR MORE: CPT | Performed by: PODIATRIST

## 2023-10-24 NOTE — PROGRESS NOTES
PATIENT:  Lilliana Hines  1935    ASSESSMENT/PLAN:  1. Peripheral vascular disease, unspecified ([address])        2. Onychomycosis  Debridement      3. Acquired keratoderma  Lesion Destruction            Orders Placed This Encounter   Procedures    Debridement    Lesion Destruction      The patient was educated in proper foot wear. Also discussed daily foot assessment and proper foot care. The patient will follow up in 9 weeks for foot exam and care. PROCEDURE:  All mycotic toenails were reduced and debrided in length, width, and girth using a nail nipper and dremel. All hyperkeratotic skin lesion(s) were sharply pared with a scalpel / forcep with no bleeding or evidence of ulceration. Patient tolerated procedure(s) well without complications. HPI:  Lilliana Hines is a 80 y. o.year old female seen for at risk foot exam and care. The patient has class findings. The patient complained of thick toenails and calluses. The patient denied any acute pedal disorder. No injury.     PAST MEDICAL HISTORY:  Past Medical History:   Diagnosis Date    Acute bronchitis 3/17/2022    Ambulatory dysfunction 10/17/2016    Arthritis     Cardiac disease     Chest pain syndrome 12/1/2022    Dyspnea on exertion 12/1/2022    Hyperlipidemia     Hypertension     Hypertension, essential, benign 11/6/2018    Ingrowing toenail with infection 9/26/2019    Preop cardiovascular exam 11/5/2019    Rash 6/3/2020    Stress incontinence of urine 8/11/2020    Thrombocytopenia ([address]) 11/10/2021    Added per ICD-10 guidelines--provider accepted    UTI (urinary tract infection) 5/31/2017    UTI symptoms 4/3/2023       PAST SURGICAL HISTORY:  Past Surgical History:   Procedure Laterality Date    APPENDECTOMY      CORONARY ANGIOPLASTY WITH STENT PLACEMENT      CYSTOSCOPY  12/29/2014    Diagnostic    HERNIA REPAIR      JOINT REPLACEMENT      bilat knee, bilat hip, L shoulder    KNEE SURGERY Bilateral     SHOULDER SURGERY  2012    TOTAL HIP ARTHROPLASTY Bilateral         ALLERGIES:  Cephalexin, Ciprofloxacin, Cortisone, Cortizone-10 [hydrocortisone], Cyclophosphamide, Dexamethasone, Erythromycin, Haemophilus b polysaccharide vaccine, Influenza virus vaccine, Iodinated casein, Loratadine, Macrobid  [nitrofurantoin monohyd macro], Motrin [ibuprofen], Mucinex chest congestion child [guaifenesin], Niacin, Nitrofurantoin, Omeprazole, Oxycodone-acetaminophen, Penicillins, Percocet  [oxycodone-acetaminophen], Pneumococcal vac polyvalent, Povidone iodine, and Sulfa antibiotics    MEDICATIONS:  Current Outpatient Medications   Medication Sig Dispense Refill    acetaminophen (TYLENOL) 650 mg CR tablet Take by mouth      albuterol (ProAir HFA) 90 mcg/act inhaler Inhale 2 puffs every 6 (six) hours as needed for wheezing 8.5 g 0    Ascorbic Acid (VITAMIN C) 1000 MG tablet Take 1,000 mg by mouth daily      aspirin 81 MG tablet Take 81 mg by mouth daily      benzonatate (TESSALON PERLES) 100 mg capsule Take 1 capsule (100 mg total) by mouth 3 (three) times a day as needed for cough 20 capsule 0    calcium citrate-vitamin D (CITRACAL+D) 315-200 MG-UNIT per tablet Take 1 tablet by mouth 2 (two) times a day      Cholecalciferol (VITAMIN D3) 1000 units CAPS Take 1 tablet by mouth daily      famotidine (PEPCID) 20 mg tablet TAKE 1 TABLET(20 MG) BY MOUTH DAILY 90 tablet 1    metoprolol tartrate (LOPRESSOR) 25 mg tablet TAKE 1 TABLET(25 MG) BY MOUTH EVERY 12 HOURS 180 tablet 1    Multiple Vitamins-Minerals (CENTRUM ADULTS PO) Take 1 tablet by mouth daily      nitroglycerin (NITROSTAT) 0.4 mg SL tablet PLACE 1 TABLET UNDER THE TONGUE EVERY 5 MINUTES AS NEEDED FOR CHEST PAIN UP TO 3 DOSES. CALL 911 IF PAIN PERSISTS 25 tablet 1    simvastatin (ZOCOR) 40 mg tablet TAKE 1 TABLET(40 MG) BY MOUTH DAILY 90 tablet 1    Xarelto 20 MG tablet TAKE 1 TABLET(20 MG) BY MOUTH DAILY WITH BREAKFAST 30 tablet 2     No current facility-administered medications for this visit.        SOCIAL HISTORY:  Social History     Socioeconomic History    Marital status:      Spouse name: None    Number of children: 4    Years of education: None    Highest education level: None   Occupational History    Occupation: Retired   Tobacco Use    Smoking status: Never     Passive exposure: Never    Smokeless tobacco: Never   Vaping Use    Vaping Use: Never used   Substance and Sexual Activity    Alcohol use: Not Currently    Drug use: No    Sexual activity: Not Currently   Other Topics Concern    None   Social History Narrative    Caffeine use, active    Completed 8th grade    No caffeine use, active, per [employer]     Social Determinants of Health     Financial Resource Strain: Not on file   Food Insecurity: Not on file   Transportation Needs: Not on file   Physical Activity: Not on file   Stress: Not on file   Social Connections: Not on file   Intimate Partner Violence: Not on file   Housing Stability: Not on file        REVIEW OF SYSTEMS:  GENERAL: No fever or chills  HEART: No chest pain, or palpitation  RESPIRATORY:  No acute SOB or cough  GI: No Nausea, vomit or diarrhea  NEUROLOGIC: No syncope or acute weakness    PHYSICAL EXAM:    /61   Pulse 63   Ht 5' 1" (1.549 m)   Wt 81.2 kg (179 lb)   BMI 33.82 kg/m²     VASCULAR EXAM  Dorsalis pedis  absent, Posterior tibial artery  absent. The patient has class findings with skin atrophy, lack of digital hair, and nail dystrophy. There is +1 and trace lower extremity edema bilaterally. NEUROLOGIC EXAM  Sensation is intact to light touch. No focal neurologic deficit. DERMATOLOGIC EXAM:   No ulcer or cellulitis noted. The patient has hypertrophic toenails X 6 with discoloration, onycholysis, and subungal debris. No notable skin lesion. Patient has hyperkeratosis X 2 in bilateral 1st met head. MUSCULOSKELETAL EXAM:   No acute joint pain, edema, or redness. No acute musculoskeletal problem.   Patient has deformity including thomas.

## 2023-10-25 DIAGNOSIS — E78.2 MIXED HYPERLIPIDEMIA: ICD-10-CM

## 2023-10-25 RX ORDER — SIMVASTATIN 40 MG
TABLET ORAL
Qty: 90 TABLET | Refills: 1 | Status: SHIPPED | OUTPATIENT
Start: 2023-10-25

## 2023-11-09 DIAGNOSIS — I48.91 ATRIAL FIBRILLATION WITH RAPID VENTRICULAR RESPONSE (HCC): ICD-10-CM

## 2023-11-09 RX ORDER — RIVAROXABAN 20 MG/1
TABLET, FILM COATED ORAL
Qty: 30 TABLET | Refills: 5 | Status: SHIPPED | OUTPATIENT
Start: 2023-11-09

## 2023-11-29 ENCOUNTER — OFFICE VISIT (OUTPATIENT)
Dept: INTERNAL MEDICINE CLINIC | Facility: CLINIC | Age: 88
End: 2023-11-29
Payer: COMMERCIAL

## 2023-11-29 VITALS
WEIGHT: 183.4 LBS | SYSTOLIC BLOOD PRESSURE: 120 MMHG | DIASTOLIC BLOOD PRESSURE: 68 MMHG | HEART RATE: 55 BPM | OXYGEN SATURATION: 98 % | BODY MASS INDEX: 34.63 KG/M2 | HEIGHT: 61 IN

## 2023-11-29 DIAGNOSIS — Z00.00 MEDICARE ANNUAL WELLNESS VISIT, SUBSEQUENT: Primary | ICD-10-CM

## 2023-11-29 DIAGNOSIS — E78.2 MIXED HYPERLIPIDEMIA: ICD-10-CM

## 2023-11-29 DIAGNOSIS — I25.10 CAD IN NATIVE ARTERY: ICD-10-CM

## 2023-11-29 DIAGNOSIS — I48.91 ATRIAL FIBRILLATION, UNSPECIFIED TYPE (HCC): ICD-10-CM

## 2023-11-29 PROCEDURE — G0439 PPPS, SUBSEQ VISIT: HCPCS | Performed by: INTERNAL MEDICINE

## 2023-11-29 PROCEDURE — 99214 OFFICE O/P EST MOD 30 MIN: CPT | Performed by: INTERNAL MEDICINE

## 2023-11-29 NOTE — PROGRESS NOTES
Assessment and Plan:     Problem List Items Addressed This Visit        Cardiovascular and Mediastinum    CAD in native artery    Relevant Orders    CBC and differential    Comprehensive metabolic panel    Lipid panel    Atrial fibrillation (HCC)    Relevant Orders    CBC and differential    Comprehensive metabolic panel    Lipid panel       Other    Hyperlipidemia    Relevant Orders    CBC and differential    Comprehensive metabolic panel    Lipid panel   Other Visit Diagnoses     Medicare annual wellness visit, subsequent    -  Primary          BMI Counseling: Body mass index is 34.65 kg/m². The BMI is above normal. Nutrition recommendations include encouraging healthy choices of fruits and vegetables, consuming healthier snacks and moderation in carbohydrate intake. Rationale for BMI follow-up plan is due to patient being overweight or obese. Depression Screening and Follow-up Plan: Patient was screened for depression during today's encounter. They screened negative with a PHQ-2 score of 0. Preventive health issues were discussed with patient, and age appropriate screening tests were ordered as noted in patient's After Visit Summary. Personalized health advice and appropriate referrals for health education or preventive services given if needed, as noted in patient's After Visit Summary. History of Present Illness:     Patient presents for a Medicare Wellness Visit    HPI   Patient Care Team:  Melinda Grant MD as PCP - General  DO Osman Gonzalez MD Linward Soman, MD Dulcy Jointer, MD Victory Michaelis Od (Optometry)     Review of Systems:     Review of Systems   Constitutional:  Positive for unexpected weight change (weight gain). Respiratory:  Positive for shortness of breath (with exertion). Cardiovascular:  Negative for chest pain, palpitations and leg swelling. Gastrointestinal:  Negative for abdominal pain. Genitourinary:  Negative for dysuria and frequency. Hematological:  Does not bruise/bleed easily.         Problem List:     Patient Active Problem List   Diagnosis   • H/O right knee surgery   • CAD in native artery   • History of cardiac catheterization   • Hyperlipidemia   • Multiple allergies   • Bursitis of left hip   • Osteoporosis   • Esophageal reflux   • Class 1 obesity due to excess calories without serious comorbidity with body mass index (BMI) of 32.0 to 32.9 in adult   • Primary osteoarthritis of right shoulder   • Chronic midline low back pain without sciatica   • Localized edema   • Adhesive capsulitis of right shoulder   • Atrial fibrillation (HCC)   • Thromboembolic disorder (HCC)   • Mixed stress and urge urinary incontinence      Past Medical and Surgical History:     Past Medical History:   Diagnosis Date   • Acute bronchitis 3/17/2022   • Ambulatory dysfunction 10/17/2016   • Arthritis    • Cardiac disease    • Chest pain syndrome 12/1/2022   • Dyspnea on exertion 12/1/2022   • Hyperlipidemia    • Hypertension    • Hypertension, essential, benign 11/6/2018   • Ingrowing toenail with infection 9/26/2019   • Preop cardiovascular exam 11/5/2019   • Rash 6/3/2020   • Stress incontinence of urine 8/11/2020   • Thrombocytopenia ([address]) 11/10/2021    Added per ICD-10 guidelines--provider accepted   • UTI (urinary tract infection) 5/31/2017   • UTI symptoms 4/3/2023     Past Surgical History:   Procedure Laterality Date   • APPENDECTOMY     • CORONARY ANGIOPLASTY WITH STENT PLACEMENT     • CYSTOSCOPY  12/29/2014    Diagnostic   • HERNIA REPAIR     • JOINT REPLACEMENT      bilat knee, bilat hip, L shoulder   • KNEE SURGERY Bilateral    • SHOULDER SURGERY  2012   • TOTAL HIP ARTHROPLASTY Bilateral       Family History:     Family History   Problem Relation Age of Onset   • Breast cancer Mother 80   • Leukemia Mother    • Gout Mother    • Hypertension Mother    • Bone cancer Father 64   • Cervical cancer Sister 52   • Aneurysm Family    • Heart attack Family myocardial infarction   • Rheum arthritis Family    • Lung cancer Sister 70   • No Known Problems Daughter    • No Known Problems Sister    • No Known Problems Daughter    • No Known Problems Daughter    • No Known Problems Son    • No Known Problems Maternal Aunt    • No Known Problems Maternal Aunt    • No Known Problems Maternal Aunt    • No Known Problems Paternal Aunt       Social History:     Social History     Socioeconomic History   • Marital status:      Spouse name: None   • Number of children: 4   • Years of education: None   • Highest education level: None   Occupational History   • Occupation: Retired   Tobacco Use   • Smoking status: Never     Passive exposure: Never   • Smokeless tobacco: Never   Vaping Use   • Vaping Use: Never used   Substance and Sexual Activity   • Alcohol use: Not Currently   • Drug use: No   • Sexual activity: Not Currently   Other Topics Concern   • None   Social History Narrative    Caffeine use, active    Completed 8th grade    No caffeine use, active, per [employer]     Social Determinants of Health     Financial Resource Strain: Low Risk  (11/29/2023)    Overall Financial Resource Strain (CARDIA)    • Difficulty of Paying Living Expenses: Not hard at all   Food Insecurity: Not on file   Transportation Needs: No Transportation Needs (11/29/2023)    PRAPARE - Transportation    • Lack of Transportation (Medical): No    • Lack of Transportation (Non-Medical):  No   Physical Activity: Not on file   Stress: Not on file   Social Connections: Not on file   Intimate Partner Violence: Not on file   Housing Stability: Not on file      Medications and Allergies:     Current Outpatient Medications   Medication Sig Dispense Refill   • acetaminophen (TYLENOL) 650 mg CR tablet Take by mouth     • albuterol (ProAir HFA) 90 mcg/act inhaler Inhale 2 puffs every 6 (six) hours as needed for wheezing 8.5 g 0   • Ascorbic Acid (VITAMIN C) 1000 MG tablet Take 1,000 mg by mouth daily • aspirin 81 MG tablet Take 81 mg by mouth daily     • benzonatate (TESSALON PERLES) 100 mg capsule Take 1 capsule (100 mg total) by mouth 3 (three) times a day as needed for cough 20 capsule 0   • calcium citrate-vitamin D (CITRACAL+D) 315-200 MG-UNIT per tablet Take 1 tablet by mouth 2 (two) times a day     • Cholecalciferol (VITAMIN D3) 1000 units CAPS Take 1 tablet by mouth daily     • famotidine (PEPCID) 20 mg tablet TAKE 1 TABLET(20 MG) BY MOUTH DAILY 90 tablet 1   • metoprolol tartrate (LOPRESSOR) 25 mg tablet TAKE 1 TABLET(25 MG) BY MOUTH EVERY 12 HOURS 180 tablet 1   • Multiple Vitamins-Minerals (CENTRUM ADULTS PO) Take 1 tablet by mouth daily     • nitroglycerin (NITROSTAT) 0.4 mg SL tablet PLACE 1 TABLET UNDER THE TONGUE EVERY 5 MINUTES AS NEEDED FOR CHEST PAIN UP TO 3 DOSES. CALL 911 IF PAIN PERSISTS 25 tablet 1   • rivaroxaban (Xarelto) 20 mg tablet TAKE 1 TABLET(20 MG) BY MOUTH DAILY WITH BREAKFAST 30 tablet 5   • simvastatin (ZOCOR) 40 mg tablet TAKE 1 TABLET(40 MG) BY MOUTH DAILY 90 tablet 1     No current facility-administered medications for this visit. Allergies   Allergen Reactions   • Cephalexin Hives   • Ciprofloxacin Hives   • Cortisone Headache   • Cortizone-10 [Hydrocortisone] Headache   • Cyclophosphamide Hives   • Dexamethasone Hives   • Erythromycin Hives   • Haemophilus B Polysaccharide Vaccine Hives   • Influenza Virus Vaccine Hives   • Iodinated Casein    • Loratadine    • Macrobid  [Nitrofurantoin Monohyd Macro] Hives   • Motrin [Ibuprofen] Itching   • Mucinex Chest Congestion Child [Guaifenesin] Itching   • Niacin Itching   • Nitrofurantoin    • Omeprazole    • Oxycodone-Acetaminophen Other (See Comments)   • Penicillins    • Percocet  [Oxycodone-Acetaminophen]    • Pneumococcal Vac Polyvalent Hives   • Povidone Iodine    • Sulfa Antibiotics       Immunizations: There is no immunization history for the selected administration types on file for this patient.    [facility] Maintenance: There are no preventive care reminders to display for this patient. Topic Date Due   • COVID-19 Vaccine (1) Never done   • Pneumococcal Vaccine: 65+ Years (1 - PCV) Never done      Medicare Screening Tests and Risk Assessments:     Paola Rosario is here for her Subsequent Wellness visit. Health Risk Assessment:   Patient rates overall health as very good. Patient feels that their physical health rating is same. Patient is satisfied with their life. Eyesight was rated as same. Hearing was rated as same. Patient feels that their emotional and mental health rating is same. Patients states they are sometimes angry. Patient states they are always unusually tired/fatigued. Pain experienced in the last 7 days has been some. Patient's pain rating has been 5/10. Patient states that she has experienced weight loss or gain in last 6 months. Depression Screening:   PHQ-2 Score: 0      Fall Risk Screening: In the past year, patient has experienced: no history of falling in past year      Urinary Incontinence Screening:   Patient has leaked urine accidently in the last six months. Home Safety:  Patient has trouble with stairs inside or outside of their home. Patient has working smoke alarms and has working carbon monoxide detector. Home safety hazards include: none. Nutrition:   Current diet is Regular. Medications:   Patient is currently taking over-the-counter supplements. OTC medications include: see medication list. Patient is able to manage medications. Activities of Daily Living (ADLs)/Instrumental Activities of Daily Living (IADLs):   Walk and transfer into and out of bed and chair?: Yes  Dress and groom yourself?: Yes    Bathe or shower yourself?: Yes    Feed yourself?  Yes  Do your laundry/housekeeping?: Yes  Manage your money, pay your bills and track your expenses?: Yes  Make your own meals?: Yes    Do your own shopping?: Yes    Previous Hospitalizations:   Any hospitalizations or ED visits within the last 12 months?: No      Advance Care Planning:   Living will: Yes    Durable POA for healthcare: No    Advanced directive: Yes    Advanced directive counseling given: Yes    ACP document given: Yes    End of Life Decisions reviewed with patient: Yes    Provider agrees with end of life decisions: Yes      PREVENTIVE SCREENINGS      Cardiovascular Screening:    General: Screening Not Indicated and History Lipid Disorder      Diabetes Screening:     General: Screening Current      Colorectal Cancer Screening:     General: Screening Not Indicated      Breast Cancer Screening:     General: Screening Not Indicated      Cervical Cancer Screening:    General: Screening Not Indicated      Osteoporosis Screening:    General: Screening Not Indicated and History Osteoporosis      Abdominal Aortic Aneurysm (AAA) Screening:        General: Screening Not Indicated      Lung Cancer Screening:     General: Screening Not Indicated      Hepatitis C Screening:    General: Screening Not Indicated    Screening, Brief Intervention, and Referral to Treatment (SBIRT)    Screening      Single Item Drug Screening:  How often have you used an illegal drug (including marijuana) or a prescription medication for non-medical reasons in the past year? never    Single Item Drug Screen Score: 0  Interpretation: Negative screen for possible drug use disorder    No results found. Physical Exam:     /68 (BP Location: Left arm, Patient Position: Sitting, Cuff Size: Standard)   Pulse 55   Ht 5' 1" (1.549 m)   Wt 83.2 kg (183 lb 6.4 oz)   SpO2 98%   BMI 34.65 kg/m²     Physical Exam  Vitals and nursing note reviewed. Constitutional:       General: She is not in acute distress. Appearance: She is well-developed. She is not ill-appearing, toxic-appearing or diaphoretic. Eyes:      Conjunctiva/sclera: Conjunctivae normal.   Cardiovascular:      Rate and Rhythm: Normal rate and regular rhythm.       Heart sounds: No murmur heard. Pulmonary:      Effort: Pulmonary effort is normal. No respiratory distress. Breath sounds: Normal breath sounds. Abdominal:      Palpations: Abdomen is soft. Tenderness: There is no abdominal tenderness. Musculoskeletal:      Cervical back: Neck supple. Right lower leg: No edema. Left lower leg: No edema. Neurological:      Mental Status: She is alert.    Psychiatric:         Mood and Affect: Mood normal.         Behavior: Behavior normal.          Giovanni Walton MD

## 2023-11-29 NOTE — PATIENT INSTRUCTIONS
Medicare Preventive Visit Patient Instructions  Thank you for completing your Welcome to Medicare Visit or Medicare Annual Wellness Visit today. Your next wellness visit will be due in one year (11/29/2024). The screening/preventive services that you may require over the next 5-10 years are detailed below. Some tests may not apply to you based off risk factors and/or age. Screening tests ordered at today's visit but not completed yet may show as past due. Also, please note that scanned in results may not display below. Preventive Screenings:  Service Recommendations Previous Testing/Comments   Colorectal Cancer Screening  * Colonoscopy    * Fecal Occult Blood Test (FOBT)/Fecal Immunochemical Test (FIT)  * Fecal DNA/Cologuard Test  * Flexible Sigmoidoscopy Age: 43-73 years old   Colonoscopy: every 10 years (may be performed more frequently if at higher risk)  OR  FOBT/FIT: every 1 year  OR  Cologuard: every 3 years  OR  Sigmoidoscopy: every 5 years  Screening may be recommended earlier than age 39 if at higher risk for colorectal cancer. Also, an individualized decision between you and your healthcare provider will decide whether screening between the ages of 77-80 would be appropriate. Colonoscopy: Not on file  FOBT/FIT: Not on file  Cologuard: Not on file  Sigmoidoscopy: Not on file          Breast Cancer Screening Age: 36 years old  Frequency: every 1-2 years  Not required if history of left and right mastectomy Mammogram: 08/18/2020        Cervical Cancer Screening Between the ages of 21-29, pap smear recommended once every 3 years. Between the ages of 32-69, can perform pap smear with HPV co-testing every 5 years.    Recommendations may differ for women with a history of total hysterectomy, cervical cancer, or abnormal pap smears in past. Pap Smear: Not on file        Hepatitis C Screening Once for adults born between [address] [place] [address]  More frequently in patients at high risk for Hepatitis C Hep C Antibody: Not on file        Diabetes Screening 1-2 times per year if you're at risk for diabetes or have pre-diabetes Fasting glucose: 98 mg/dL (4/17/2023)  A1C: 5.3 % (7/8/2021)      Cholesterol Screening Once every 5 years if you don't have a lipid disorder. May order more often based on risk factors. Lipid panel: 04/17/2023          Other Preventive Screenings Covered by Medicare:  Abdominal Aortic Aneurysm (AAA) Screening: covered once if your at risk. You're considered to be at risk if you have a family history of AAA. Lung Cancer Screening: covers low dose CT scan once per year if you meet all of the following conditions: (1) Age 48-67; (2) No signs or symptoms of lung cancer; (3) Current smoker or have quit smoking within the last 15 years; (4) You have a tobacco smoking history of at least 20 pack years (packs per day multiplied by number of years you smoked); (5) You get a written order from a healthcare provider. Glaucoma Screening: covered annually if you're considered high risk: (1) You have diabetes OR (2) Family history of glaucoma OR (3)  aged 48 and older OR (3)  American aged 72 and older  Osteoporosis Screening: covered every 2 years if you meet one of the following conditions: (1) You're estrogen deficient and at risk for osteoporosis based off medical history and other findings; (2) Have a vertebral abnormality; (3) On glucocorticoid therapy for more than 3 months; (4) Have primary hyperparathyroidism; (5) On osteoporosis medications and need to assess response to drug therapy. Last bone density test (DXA Scan): 04/08/2016. HIV Screening: covered annually if you're between the age of 14-79. Also covered annually if you are younger than 13 and older than 72 with risk factors for HIV infection. For pregnant patients, it is covered up to 3 times per pregnancy.     Immunizations:  Immunization Recommendations   Influenza Vaccine Annual influenza vaccination during flu season is recommended for all persons aged >= 6 months who do not have contraindications   Pneumococcal Vaccine   * Pneumococcal conjugate vaccine = PCV13 (Prevnar 13), PCV15 (Vaxneuvance), PCV20 (Prevnar 20)  * Pneumococcal polysaccharide vaccine = PPSV23 (Pneumovax) Adults 00-20 yo with certain risk factors or if 69+ yo  If never received any pneumonia vaccine: recommend Prevnar 20 (PCV20)  Give PCV20 if previously received 1 dose of PCV13 or PPSV23   Hepatitis B Vaccine 3 dose series if at intermediate or high risk (ex: diabetes, end stage renal disease, liver disease)   Respiratory syncytial virus (RSV) Vaccine - COVERED BY MEDICARE PART D  * RSVPreF3 (Arexvy) CDC recommends that adults 61years of age and older may receive a single dose of RSV vaccine using shared clinical decision-making (SCDM)   Tetanus (Td) Vaccine - COST NOT COVERED BY MEDICARE PART B Following completion of primary series, a booster dose should be given every 10 years to maintain immunity against tetanus. Td may also be given as tetanus wound prophylaxis. Tdap Vaccine - COST NOT COVERED BY MEDICARE PART B Recommended at least once for all adults. For pregnant patients, recommended with each pregnancy. Shingles Vaccine (Shingrix) - COST NOT COVERED BY MEDICARE PART B  2 shot series recommended in those 19 years and older who have or will have weakened immune systems or those 50 years and older     Health Maintenance Due:  There are no preventive care reminders to display for this patient. Immunizations Due:      Topic Date Due   • COVID-19 Vaccine (1) Never done   • Pneumococcal Vaccine: 65+ Years (1 - PCV) Never done     Advance Directives   What are advance directives? Advance directives are legal documents that state your wishes and plans for medical care. These plans are made ahead of time in case you lose your ability to make decisions for yourself.  Advance directives can apply to any medical decision, such as the treatments you want, and if you want to donate organs. What are the types of advance directives? There are many types of advance directives, and each state has rules about how to use them. You may choose a combination of any of the following:  Living will: This is a written record of the treatment you want. You can also choose which treatments you do not want, which to limit, and which to stop at a certain time. This includes surgery, medicine, IV fluid, and tube feedings. Durable power of  for [facility]): This is a written record that states who you want to make healthcare choices for you when you are unable to make them for yourself. This person, called a proxy, is usually a family member or a friend. You may choose more than 1 proxy. Do not resuscitate (DNR) order:  A DNR order is used in case your heart stops beating or you stop breathing. It is a request not to have certain forms of treatment, such as CPR. A DNR order may be included in other types of advance directives. Medical directive: This covers the care that you want if you are in a coma, near death, or unable to make decisions for yourself. You can list the treatments you want for each condition. Treatment may include pain medicine, surgery, blood transfusions, dialysis, IV or tube feedings, and a ventilator (breathing machine). Values history: This document has questions about your views, beliefs, and how you feel and think about life. This information can help others choose the care that you would choose. Why are advance directives important? An advance directive helps you control your care. Although spoken wishes may be used, it is better to have your wishes written down. Spoken wishes can be misunderstood, or not followed. Treatments may be given even if you do not want them. An advance directive may make it easier for your family to make difficult choices about your care.    Urinary Incontinence   Urinary incontinence (UI)  is when you lose control of your bladder. UI develops because your bladder cannot store or empty urine properly. The 3 most common types of UI are stress incontinence, urge incontinence, or both. Medicines:   May be given to help strengthen your bladder control. Report any side effects of medication to your healthcare provider. Do pelvic muscle exercises often:  Your pelvic muscles help you stop urinating. Squeeze these muscles tight for 5 seconds, then relax for 5 seconds. Gradually work up to squeezing for 10 seconds. Do 3 sets of 15 repetitions a day, or as directed. This will help strengthen your pelvic muscles and improve bladder control. Train your bladder:  Go to the bathroom at set times, such as every 2 hours, even if you do not feel the urge to go. You can also try to hold your urine when you feel the urge to go. For example, hold your urine for 5 minutes when you feel the urge to go. As that becomes easier, hold your urine for 10 minutes. Self-care:   Keep a UI record. Write down how often you leak urine and how much you leak. Make a note of what you were doing when you leaked urine. Drink liquids as directed. You may need to limit the amount of liquid you drink to help control your urine leakage. Do not drink any liquid right before you go to bed. Limit or do not have drinks that contain caffeine or alcohol. Prevent constipation. Eat a variety of high-fiber foods. Good examples are high-fiber cereals, beans, vegetables, and whole-grain breads. Walking is the best way to trigger your intestines to have a bowel movement. Exercise regularly and maintain a healthy weight. Weight loss and exercise will decrease pressure on your bladder and help you control your leakage. Use a catheter as directed  to help empty your bladder. A catheter is a tiny, plastic tube that is put into your bladder to drain your urine. Go to behavior therapy as directed.   Behavior therapy may be used to help you learn to control your urge to urinate. Weight Management   Why it is important to manage your weight:  Being overweight increases your risk of health conditions such as heart disease, high blood pressure, type 2 diabetes, and certain types of cancer. It can also increase your risk for osteoarthritis, sleep apnea, and other respiratory problems. Aim for a slow, steady weight loss. Even a small amount of weight loss can lower your risk of health problems. How to lose weight safely:  A safe and healthy way to lose weight is to eat fewer calories and get regular exercise. You can lose up about 1 pound a week by decreasing the number of calories you eat by 500 calories each day. Healthy meal plan for weight management:  A healthy meal plan includes a variety of foods, contains fewer calories, and helps you stay healthy. A healthy meal plan includes the following:  Eat whole-grain foods more often. A healthy meal plan should contain fiber. Fiber is the part of grains, fruits, and vegetables that is not broken down by your body. Whole-grain foods are healthy and provide extra fiber in your diet. Some examples of whole-grain foods are whole-wheat breads and pastas, oatmeal, brown rice, and bulgur. Eat a variety of vegetables every day. Include dark, leafy greens such as spinach, kale, ernesto greens, and mustard greens. Eat yellow and orange vegetables such as carrots, sweet potatoes, and winter squash. Eat a variety of fruits every day. Choose fresh or canned fruit (canned in its own juice or light syrup) instead of juice. Fruit juice has very little or no fiber. Eat low-fat dairy foods. Drink fat-free (skim) milk or 1% milk. Eat fat-free yogurt and low-fat cottage cheese. Try low-fat cheeses such as mozzarella and other reduced-fat cheeses. Choose meat and other protein foods that are low in fat. Choose beans or other legumes such as split peas or lentils.  Choose fish, skinless poultry (chicken or turkey), or lean cuts of red meat (beef or pork). Before you cook meat or poultry, cut off any visible fat. Use less fat and oil. Try baking foods instead of frying them. Add less fat, such as margarine, sour cream, regular salad dressing and mayonnaise to foods. Eat fewer high-fat foods. Some examples of high-fat foods include french fries, doughnuts, ice cream, and cakes. Eat fewer sweets. Limit foods and drinks that are high in sugar. This includes candy, cookies, regular soda, and sweetened drinks. Exercise:  Exercise at least 30 minutes per day on most days of the week. Some examples of exercise include walking, biking, dancing, and swimming. You can also fit in more physical activity by taking the stairs instead of the elevator or parking farther away from stores. Ask your healthcare provider about the best exercise plan for you. © Copyright [employer] 2018 Information is for End User's use only and may not be sold, redistributed or otherwise used for commercial purposes.  All illustrations and images included in CareNotes® are the copyrighted property of [employer] or [address] [place] [address]

## 2023-12-07 ENCOUNTER — OFFICE VISIT (OUTPATIENT)
Dept: CARDIOLOGY CLINIC | Facility: CLINIC | Age: 88
End: 2023-12-07
Payer: COMMERCIAL

## 2023-12-07 VITALS
SYSTOLIC BLOOD PRESSURE: 116 MMHG | DIASTOLIC BLOOD PRESSURE: 58 MMHG | OXYGEN SATURATION: 98 % | WEIGHT: 181 LBS | HEART RATE: 73 BPM | HEIGHT: 61 IN | BODY MASS INDEX: 34.17 KG/M2

## 2023-12-07 DIAGNOSIS — E78.2 MIXED HYPERLIPIDEMIA: ICD-10-CM

## 2023-12-07 DIAGNOSIS — I48.0 PAROXYSMAL ATRIAL FIBRILLATION (HCC): ICD-10-CM

## 2023-12-07 DIAGNOSIS — R06.09 DYSPNEA ON EXERTION: ICD-10-CM

## 2023-12-07 DIAGNOSIS — Z98.890 HISTORY OF CARDIAC CATHETERIZATION: ICD-10-CM

## 2023-12-07 DIAGNOSIS — I25.10 CAD IN NATIVE ARTERY: Primary | ICD-10-CM

## 2023-12-07 PROCEDURE — 99214 OFFICE O/P EST MOD 30 MIN: CPT | Performed by: INTERNAL MEDICINE

## 2023-12-07 NOTE — PROGRESS NOTES
Cardiology Follow Up    Paolo Keys  1935  [id number]  [address] [facility]  [address]  [address] [facility]  [phone]    1. CAD in native artery        2. Paroxysmal atrial fibrillation (HCC)  Holter monitor      3. History of cardiac catheterization        4. Mixed hyperlipidemia        5. Dyspnea on exertion            Interval History: Cardiology follow-up. Patient to be clinically stable. Denies any significant palpitations. No significant bleeding issues on full anticoagulation with a factor Xa inhibitor plus aspirin. Denies any focal neurological deficits or amaurosis fugax. Compliant low-cholesterol diet, lipids this year total cholesterol 149, HDL 55, LDL 68, she is on high intensity statin therapy. Compliant with low-sodium diet, blood pressures been well-controlled. She denies any new exertional symptoms no chest pain . she continues to experience dyspnea class II at the present time. She paces herself and able to do most of her house chores. No orthopnea no PND. Denies any syncope or presyncope.     Patient Active Problem List   Diagnosis    H/O right knee surgery    CAD in native artery    History of cardiac catheterization    Hyperlipidemia    Multiple allergies    Bursitis of left hip    Osteoporosis    Esophageal reflux    Class 1 obesity due to excess calories without serious comorbidity with body mass index (BMI) of 32.0 to 32.9 in adult    Primary osteoarthritis of right shoulder    Chronic midline low back pain without sciatica    Localized edema    Adhesive capsulitis of right shoulder    Atrial fibrillation (HCC)    Thromboembolic disorder (HCC)    Dyspnea on exertion    Mixed stress and urge urinary incontinence     Past Medical History:   Diagnosis Date    Acute bronchitis 3/17/2022    Ambulatory dysfunction 10/17/2016    Arthritis     Cardiac disease     Chest pain syndrome 12/1/2022 Dyspnea on exertion 12/1/2022    Hyperlipidemia     Hypertension     Hypertension, essential, benign 11/6/2018    Ingrowing toenail with infection 9/26/2019    Preop cardiovascular exam 11/5/2019    Rash 6/3/2020    Stress incontinence of urine 8/11/2020    Thrombocytopenia ([address]) 11/10/2021    Added per ICD-10 guidelines--provider accepted    UTI (urinary tract infection) 5/31/2017    UTI symptoms 4/3/2023     Social History     Socioeconomic History    Marital status:      Spouse name: Not on file    Number of children: 4    Years of education: Not on file    Highest education level: Not on file   Occupational History    Occupation: Retired   Tobacco Use    Smoking status: Never     Passive exposure: Never    Smokeless tobacco: Never   Vaping Use    Vaping Use: Never used   Substance and Sexual Activity    Alcohol use: Not Currently    Drug use: No    Sexual activity: Not Currently   Other Topics Concern    Not on file   Social History Narrative    Caffeine use, active    Completed 8th grade    No caffeine use, active, per [employer]     Social Determinants of Health     Financial Resource Strain: Low Risk  (11/29/2023)    Overall Financial Resource Strain (CARDIA)     Difficulty of Paying Living Expenses: Not hard at all   Food Insecurity: Not on file   Transportation Needs: No Transportation Needs (11/29/2023)    PRAPARE - Transportation     Lack of Transportation (Medical): No     Lack of Transportation (Non-Medical):  No   Physical Activity: Not on file   Stress: Not on file   Social Connections: Not on file   Intimate Partner Violence: Not on file   Housing Stability: Not on file      Family History   Problem Relation Age of Onset    Breast cancer Mother 80    Leukemia Mother     Gout Mother     Hypertension Mother     Bone cancer Father 64    Cervical cancer Sister 52    Aneurysm Family     Heart attack Family         myocardial infarction    Rheum arthritis Family     Lung cancer Sister 70    No Known Problems Daughter     No Known Problems Sister     No Known Problems Daughter     No Known Problems Daughter     No Known Problems Son     No Known Problems Maternal Aunt     No Known Problems Maternal Aunt     No Known Problems Maternal Aunt     No Known Problems Paternal Aunt      Past Surgical History:   Procedure Laterality Date    APPENDECTOMY      CORONARY ANGIOPLASTY WITH STENT PLACEMENT      CYSTOSCOPY  12/29/2014    Diagnostic    HERNIA REPAIR      JOINT REPLACEMENT      bilat knee, bilat hip, L shoulder    KNEE SURGERY Bilateral     SHOULDER SURGERY  2012    TOTAL HIP ARTHROPLASTY Bilateral        Current Outpatient Medications:     acetaminophen (TYLENOL) 650 mg CR tablet, Take by mouth, Disp: , Rfl:     albuterol (ProAir HFA) 90 mcg/act inhaler, Inhale 2 puffs every 6 (six) hours as needed for wheezing, Disp: 8.5 g, Rfl: 0    Ascorbic Acid (VITAMIN C) 1000 MG tablet, Take 1,000 mg by mouth daily, Disp: , Rfl:     aspirin 81 MG tablet, Take 81 mg by mouth daily, Disp: , Rfl:     benzonatate (TESSALON PERLES) 100 mg capsule, Take 1 capsule (100 mg total) by mouth 3 (three) times a day as needed for cough, Disp: 20 capsule, Rfl: 0    calcium citrate-vitamin D (CITRACAL+D) 315-200 MG-UNIT per tablet, Take 1 tablet by mouth 2 (two) times a day, Disp: , Rfl:     Cholecalciferol (VITAMIN D3) 1000 units CAPS, Take 1 tablet by mouth daily, Disp: , Rfl:     famotidine (PEPCID) 20 mg tablet, TAKE 1 TABLET(20 MG) BY MOUTH DAILY, Disp: 90 tablet, Rfl: 1    metoprolol tartrate (LOPRESSOR) 25 mg tablet, TAKE 1 TABLET(25 MG) BY MOUTH EVERY 12 HOURS, Disp: 180 tablet, Rfl: 1    Multiple Vitamins-Minerals (CENTRUM ADULTS PO), Take 1 tablet by mouth daily, Disp: , Rfl:     nitroglycerin (NITROSTAT) 0.4 mg SL tablet, PLACE 1 TABLET UNDER THE TONGUE EVERY 5 MINUTES AS NEEDED FOR CHEST PAIN UP TO 3 DOSES.  CALL 911 IF PAIN PERSISTS, Disp: 25 tablet, Rfl: 1    rivaroxaban (Xarelto) 20 mg tablet, TAKE 1 TABLET(20 MG) BY MOUTH DAILY WITH BREAKFAST, Disp: 30 tablet, Rfl: 5    simvastatin (ZOCOR) 40 mg tablet, TAKE 1 TABLET(40 MG) BY MOUTH DAILY, Disp: 90 tablet, Rfl: 1  Allergies   Allergen Reactions    Cephalexin Hives    Ciprofloxacin Hives    Cortisone Headache    Cortizone-10 [Hydrocortisone] Headache    Cyclophosphamide Hives    Dexamethasone Hives    Erythromycin Hives    Haemophilus B Polysaccharide Vaccine Hives    Influenza Virus Vaccine Hives    Iodinated Casein     Loratadine     Macrobid  [Nitrofurantoin Monohyd Macro] Hives    Motrin [Ibuprofen] Itching    Mucinex Chest Congestion Child [Guaifenesin] Itching    Niacin Itching    Nitrofurantoin     Omeprazole     Oxycodone-Acetaminophen Other (See Comments)    Penicillins     Percocet  [Oxycodone-Acetaminophen]     Pneumococcal Vac Polyvalent Hives    Povidone Iodine     Sulfa Antibiotics        Labs:  No visits with results within 6 Month(s) from this visit. Latest known visit with results is:   Appointment on 04/17/2023   Component Date Value    WBC 04/17/2023 6.31     RBC 04/17/2023 4.01     Hemoglobin 04/17/2023 12.9     Hematocrit 04/17/2023 40.3     MCV 04/17/2023 101 (H)     MCH 04/17/2023 32.2     MCHC 04/17/2023 32.0     RDW 04/17/2023 13.2     Platelets 67/14/8655 183     MPV 04/17/2023 9.8     Sodium 04/17/2023 143     Potassium 04/17/2023 4.3     Chloride 04/17/2023 108     CO2 04/17/2023 28     ANION GAP 04/17/2023 7     BUN 04/17/2023 14     Creatinine 04/17/2023 0.81     Glucose, Fasting 04/17/2023 98     Calcium 04/17/2023 9.6     AST 04/17/2023 25     ALT 04/17/2023 12     Alkaline Phosphatase 04/17/2023 70     Total Protein 04/17/2023 6.5     Albumin 04/17/2023 3.7     Total Bilirubin 04/17/2023 0.76     eGFR 04/17/2023 65     Cholesterol 04/17/2023 149     Triglycerides 04/17/2023 132     HDL, Direct 04/17/2023 55     LDL Calculated 04/17/2023 68      Imaging: No results found.     Review of Systems:  Review of Systems   Constitutional:  Negative for activity change, fatigue and unexpected weight change. HENT:  Negative for hearing loss and nosebleeds. Eyes:  Negative for visual disturbance. Respiratory:  Positive for shortness of breath. Negative for apnea, wheezing and stridor. Cardiovascular:  Negative for chest pain, palpitations and leg swelling. Gastrointestinal:  Negative for abdominal pain, anal bleeding and blood in stool. Endocrine: Negative for cold intolerance. Genitourinary:  Negative for hematuria. Musculoskeletal:  Positive for arthralgias. Negative for gait problem and myalgias. Skin:  Negative for pallor and rash. Neurological:  Negative for dizziness, syncope, facial asymmetry, speech difficulty and weakness. Hematological:  Bruises/bleeds easily. Psychiatric/Behavioral:  Negative for sleep disturbance. The patient is not nervous/anxious. Physical Exam:  Physical Exam  Vitals reviewed. Constitutional:       General: She is not in acute distress. Appearance: Normal appearance. She is obese. She is not ill-appearing, toxic-appearing or diaphoretic. Eyes:      General: No scleral icterus. Neck:      Vascular: No carotid bruit. Cardiovascular:      Rate and Rhythm: Normal rate and regular rhythm. Pulses: Normal pulses. Heart sounds: Normal heart sounds. No murmur heard. No gallop. Pulmonary:      Effort: Pulmonary effort is normal. No respiratory distress. Breath sounds: Normal breath sounds. No stridor. No wheezing, rhonchi or rales. Musculoskeletal:      Right lower leg: No edema. Left lower leg: No edema. Skin:     General: Skin is warm and dry. Capillary Refill: Capillary refill takes less than 2 seconds. Coloration: Skin is not jaundiced or pale. Findings: No bruising or erythema. Neurological:      Mental Status: She is alert and oriented to person, place, and time.    Psychiatric:         Mood and Affect: Mood normal.         Discussion/Summary: Paroxysmal atrial fibrillation, patient remains clinically and electrocardiographically in normal sinus rhythm. Will continue full anticoagulation and beta-blocker. Favor no antiarrhythmic at the present time. Mild-to-moderate cardioembolic risk. Will repeat a Holter monitor. Pharmacological stress test suggested no ischemia, she was in sinus rhythm at that time, an echocardiogram revealed normal left ventricular systolic function with stage I diastolic dysfunction interestingly so, there was mild left atrial enlargement and mild aortic and mitral insufficiency. She does have mild chronotropic incompetence sick sinus syndrome. No indication for pacemaker therapy at the present time. Dyspnea multifactorial, no clear-cut ischemic etiology or valvular heart disease patient states she does snore a lot. No history of sleep apnea. Might consider polysomnogram  Coronary artery disease PTCA/drug stent of a large diagonal system 2015. Recent testing as above. Continue aspirin therapy and statin therapy. This note was completed in part utilizing [employer] direct voice recognition software. Grammatical errors, random word insertion, spelling mistakes, and incomplete sentences may be an occasional consequence of the system secondary to software limitations, ambient noise and hardware issues. At the time of dictation, efforts were made to edit, clarify and /or correct errors. Please read the chart carefully and recognize, using context, where substitutions have occurred. If you have any questions or concerns about the context, text or information contained within the body of this dictation, please contact myself, the provider, for further clarification.

## 2023-12-19 ENCOUNTER — OFFICE VISIT (OUTPATIENT)
Dept: PODIATRY | Facility: CLINIC | Age: 88
End: 2023-12-19

## 2023-12-19 VITALS
HEART RATE: 62 BPM | SYSTOLIC BLOOD PRESSURE: 133 MMHG | DIASTOLIC BLOOD PRESSURE: 74 MMHG | BODY MASS INDEX: 34.17 KG/M2 | HEIGHT: 61 IN | WEIGHT: 181 LBS

## 2023-12-19 DIAGNOSIS — L85.1 ACQUIRED KERATODERMA: ICD-10-CM

## 2023-12-19 DIAGNOSIS — I73.9 PERIPHERAL VASCULAR DISEASE, UNSPECIFIED (HCC): Primary | ICD-10-CM

## 2023-12-19 DIAGNOSIS — B35.1 ONYCHOMYCOSIS: ICD-10-CM

## 2023-12-19 PROCEDURE — NCFTCARE PR NON-COVERED FOOT CARE: Performed by: PODIATRIST

## 2023-12-19 NOTE — PROGRESS NOTES
PATIENT:  Olga Ramirez  1935    ASSESSMENT/PLAN:  1. Peripheral vascular disease, unspecified (HCC)        2. Onychomycosis        3. Acquired keratoderma                   The patient was educated in proper foot wear.  Discussed daily foot assessment and proper foot care.  The patient will follow up in 9 weeks for foot exam and care.      PROCEDURE:  All mycotic toenails were reduced and debrided in length, width, and girth using a nail nipper and dremel.  All hyperkeratotic skin lesion(s) were sharply pared with a scalpel / forcep with no bleeding or evidence of ulceration.  Patient tolerated procedure(s) well without complications.    HPI:  Olga Ramirez is a 88 y.o.year old female seen for at risk foot exam and care.  The patient has class findings.  The patient complained of thick toenails and calluses.  The patient denied any acute pedal disorder.      PAST MEDICAL HISTORY:  Past Medical History:   Diagnosis Date    Acute bronchitis 3/17/2022    Ambulatory dysfunction 10/17/2016    Arthritis     Cardiac disease     Chest pain syndrome 12/1/2022    Dyspnea on exertion 12/1/2022    Hyperlipidemia     Hypertension     Hypertension, essential, benign 11/6/2018    Ingrowing toenail with infection 9/26/2019    Preop cardiovascular exam 11/5/2019    Rash 6/3/2020    Stress incontinence of urine 8/11/2020    Thrombocytopenia (HCC) 11/10/2021    Added per ICD-10 guidelines--provider accepted    UTI (urinary tract infection) 5/31/2017    UTI symptoms 4/3/2023       PAST SURGICAL HISTORY:  Past Surgical History:   Procedure Laterality Date    APPENDECTOMY      CORONARY ANGIOPLASTY WITH STENT PLACEMENT      CYSTOSCOPY  12/29/2014    Diagnostic    HERNIA REPAIR      JOINT REPLACEMENT      bilat knee, bilat hip, L shoulder    KNEE SURGERY Bilateral     SHOULDER SURGERY  2012    TOTAL HIP ARTHROPLASTY Bilateral         ALLERGIES:  Cephalexin, Ciprofloxacin, Cortisone, Cortizone-10 [hydrocortisone],  Cyclophosphamide, Dexamethasone, Erythromycin, Haemophilus b polysaccharide vaccine, Influenza virus vaccine, Iodinated casein, Loratadine, Macrobid  [nitrofurantoin monohyd macro], Motrin [ibuprofen], Mucinex chest congestion child [guaifenesin], Niacin, Nitrofurantoin, Omeprazole, Oxycodone-acetaminophen, Penicillins, Percocet  [oxycodone-acetaminophen], Pneumococcal vac polyvalent, Povidone iodine, and Sulfa antibiotics    MEDICATIONS:  Current Outpatient Medications   Medication Sig Dispense Refill    acetaminophen (TYLENOL) 650 mg CR tablet Take by mouth      albuterol (ProAir HFA) 90 mcg/act inhaler Inhale 2 puffs every 6 (six) hours as needed for wheezing 8.5 g 0    Ascorbic Acid (VITAMIN C) 1000 MG tablet Take 1,000 mg by mouth daily      aspirin 81 MG tablet Take 81 mg by mouth daily      benzonatate (TESSALON PERLES) 100 mg capsule Take 1 capsule (100 mg total) by mouth 3 (three) times a day as needed for cough 20 capsule 0    calcium citrate-vitamin D (CITRACAL+D) 315-200 MG-UNIT per tablet Take 1 tablet by mouth 2 (two) times a day      Cholecalciferol (VITAMIN D3) 1000 units CAPS Take 1 tablet by mouth daily      famotidine (PEPCID) 20 mg tablet TAKE 1 TABLET(20 MG) BY MOUTH DAILY 90 tablet 1    metoprolol tartrate (LOPRESSOR) 25 mg tablet TAKE 1 TABLET(25 MG) BY MOUTH EVERY 12 HOURS 180 tablet 1    Multiple Vitamins-Minerals (CENTRUM ADULTS PO) Take 1 tablet by mouth daily      nitroglycerin (NITROSTAT) 0.4 mg SL tablet PLACE 1 TABLET UNDER THE TONGUE EVERY 5 MINUTES AS NEEDED FOR CHEST PAIN UP TO 3 DOSES. CALL 911 IF PAIN PERSISTS 25 tablet 1    rivaroxaban (Xarelto) 20 mg tablet TAKE 1 TABLET(20 MG) BY MOUTH DAILY WITH BREAKFAST 30 tablet 5    simvastatin (ZOCOR) 40 mg tablet TAKE 1 TABLET(40 MG) BY MOUTH DAILY 90 tablet 1     No current facility-administered medications for this visit.       SOCIAL HISTORY:  Social History     Socioeconomic History    Marital status:      Spouse name: Not on  "file    Number of children: 4    Years of education: Not on file    Highest education level: Not on file   Occupational History    Occupation: Retired   Tobacco Use    Smoking status: Never     Passive exposure: Never    Smokeless tobacco: Never   Vaping Use    Vaping status: Never Used   Substance and Sexual Activity    Alcohol use: Not Currently    Drug use: No    Sexual activity: Not Currently   Other Topics Concern    Not on file   Social History Narrative    Caffeine use, active    Completed 8th grade    No caffeine use, active, per [employer]     Social Determinants of Health     Financial Resource Strain: Low Risk  (11/29/2023)    Overall Financial Resource Strain (CARDIA)     Difficulty of Paying Living Expenses: Not hard at all   Food Insecurity: Not on file   Transportation Needs: No Transportation Needs (11/29/2023)    PRAPARE - Transportation     Lack of Transportation (Medical): No     Lack of Transportation (Non-Medical): No   Physical Activity: Not on file   Stress: Not on file   Social Connections: Not on file   Intimate Partner Violence: Not on file   Housing Stability: Not on file        REVIEW OF SYSTEMS:  GENERAL: No fever or chills  HEART: No chest pain, or palpitation  RESPIRATORY:  No acute SOB or cough  GI: No Nausea, vomit or diarrhea  NEUROLOGIC: No syncope or acute weakness    PHYSICAL EXAM:    /74   Pulse 62   Ht 5' 1\" (1.549 m)   Wt 82.1 kg (181 lb)   BMI 34.20 kg/m²     VASCULAR EXAM  Dorsalis pedis  absent, Posterior tibial artery  absent.  The patient has class findings with skin atrophy, lack of digital hair, and nail dystrophy.  There is +1 and trace lower extremity edema bilaterally.      NEUROLOGIC EXAM  Sensation is intact to light touch.  No focal neurologic deficit.          DERMATOLOGIC EXAM:   No ulcer or cellulitis noted.  The patient has hypertrophic toenails X 6 with discoloration, onycholysis, and subungal debris.  No notable skin lesion.  Patient has " hyperkeratosis X 2 in bilateral 1st met head.    MUSCULOSKELETAL EXAM:   No acute joint pain, edema, or redness.  No acute musculoskeletal problem.  Patient has deformity including hammertoes.

## 2023-12-28 ENCOUNTER — HOSPITAL ENCOUNTER (OUTPATIENT)
Dept: NON INVASIVE DIAGNOSTICS | Facility: CLINIC | Age: 88
Discharge: HOME | End: 2023-12-28
Payer: COMMERCIAL

## 2023-12-28 DIAGNOSIS — I48.0 PAROXYSMAL ATRIAL FIBRILLATION (HCC): ICD-10-CM

## 2023-12-28 PROCEDURE — 93226 XTRNL ECG REC<48 HR SCAN A/R: CPT

## 2023-12-28 PROCEDURE — 93225 XTRNL ECG REC<48 HRS REC: CPT

## 2024-01-02 PROCEDURE — 93227 XTRNL ECG REC<48 HR R&I: CPT | Performed by: INTERNAL MEDICINE

## 2024-01-19 NOTE — TELEPHONE ENCOUNTER
35 weeks pregnant.  Pt was seen yesterday and had an internal exam.  She had an episode of spotting last night.  Reassured pt can be normal after an internal exam.  Pt advised to monitor for any bleeding like a period and call office with any changes   Called pharmacy and let them know the patient is on both medications, they will fill scripts

## 2024-01-23 DIAGNOSIS — K21.9 GASTROESOPHAGEAL REFLUX DISEASE: ICD-10-CM

## 2024-01-23 RX ORDER — FAMOTIDINE 20 MG/1
TABLET, FILM COATED ORAL
Qty: 90 TABLET | Refills: 1 | Status: SHIPPED | OUTPATIENT
Start: 2024-01-23

## 2024-02-25 DIAGNOSIS — I48.91 ATRIAL FIBRILLATION WITH RAPID VENTRICULAR RESPONSE (HCC): ICD-10-CM

## 2024-02-27 ENCOUNTER — OFFICE VISIT (OUTPATIENT)
Dept: PODIATRY | Facility: CLINIC | Age: 89
End: 2024-02-27
Payer: COMMERCIAL

## 2024-02-27 VITALS
HEIGHT: 61 IN | BODY MASS INDEX: 34.17 KG/M2 | SYSTOLIC BLOOD PRESSURE: 98 MMHG | DIASTOLIC BLOOD PRESSURE: 68 MMHG | WEIGHT: 181 LBS

## 2024-02-27 DIAGNOSIS — B35.1 ONYCHOMYCOSIS: ICD-10-CM

## 2024-02-27 DIAGNOSIS — L85.1 ACQUIRED KERATODERMA: ICD-10-CM

## 2024-02-27 DIAGNOSIS — I73.9 PERIPHERAL VASCULAR DISEASE, UNSPECIFIED (HCC): Primary | ICD-10-CM

## 2024-02-27 PROCEDURE — 11056 PARNG/CUTG B9 HYPRKR LES 2-4: CPT | Performed by: PODIATRIST

## 2024-02-27 PROCEDURE — 11721 DEBRIDE NAIL 6 OR MORE: CPT | Performed by: PODIATRIST

## 2024-02-27 NOTE — PROGRESS NOTES
PATIENT:  Olga Ramirez  1935    ASSESSMENT/PLAN:  1. Peripheral vascular disease, unspecified (HCC)        2. Onychomycosis  Debridement      3. Acquired keratoderma  Debridement                 The patient was educated in proper foot wear.  Discussed daily foot assessment and proper foot care.  The patient will follow up in 10 weeks for foot exam and care.      PROCEDURE:  All mycotic toenails were reduced and debrided in length, width, and girth using a nail nipper and dremel.  All hyperkeratotic skin lesion(s) were sharply pared with a scalpel / forcep with no bleeding or evidence of ulceration.  Patient tolerated procedure(s) well without complications.    HPI:  Olga Ramirez is a 88 y.o.year old female seen for at risk foot exam and care.  The patient has class findings.  The patient complained of thick toenails and calluses.  The patient denied any acute pedal disorder.      PAST MEDICAL HISTORY:  Past Medical History:   Diagnosis Date    Acute bronchitis 3/17/2022    Ambulatory dysfunction 10/17/2016    Arthritis     Cardiac disease     Chest pain syndrome 12/1/2022    Dyspnea on exertion 12/1/2022    Hyperlipidemia     Hypertension     Hypertension, essential, benign 11/6/2018    Ingrowing toenail with infection 9/26/2019    Preop cardiovascular exam 11/5/2019    Rash 6/3/2020    Stress incontinence of urine 8/11/2020    Thrombocytopenia (HCC) 11/10/2021    Added per ICD-10 guidelines--provider accepted    UTI (urinary tract infection) 5/31/2017    UTI symptoms 4/3/2023       PAST SURGICAL HISTORY:  Past Surgical History:   Procedure Laterality Date    APPENDECTOMY      CORONARY ANGIOPLASTY WITH STENT PLACEMENT      CYSTOSCOPY  12/29/2014    Diagnostic    HERNIA REPAIR      JOINT REPLACEMENT      bilat knee, bilat hip, L shoulder    KNEE SURGERY Bilateral     SHOULDER SURGERY  2012    TOTAL HIP ARTHROPLASTY Bilateral         ALLERGIES:  Cephalexin, Ciprofloxacin, Cortisone, Cortizone-10  [hydrocortisone], Cyclophosphamide, Dexamethasone, Erythromycin, Haemophilus b polysaccharide vaccine, Influenza virus vaccine, Iodinated casein, Loratadine, Macrobid  [nitrofurantoin monohyd macro], Motrin [ibuprofen], Mucinex chest congestion child [guaifenesin], Niacin, Nitrofurantoin, Omeprazole, Oxycodone-acetaminophen, Penicillins, Percocet  [oxycodone-acetaminophen], Pneumococcal vac polyvalent, Povidone iodine, and Sulfa antibiotics    MEDICATIONS:  Current Outpatient Medications   Medication Sig Dispense Refill    acetaminophen (TYLENOL) 650 mg CR tablet Take by mouth      albuterol (ProAir HFA) 90 mcg/act inhaler Inhale 2 puffs every 6 (six) hours as needed for wheezing 8.5 g 0    Ascorbic Acid (VITAMIN C) 1000 MG tablet Take 1,000 mg by mouth daily      aspirin 81 MG tablet Take 81 mg by mouth daily      benzonatate (TESSALON PERLES) 100 mg capsule Take 1 capsule (100 mg total) by mouth 3 (three) times a day as needed for cough 20 capsule 0    calcium citrate-vitamin D (CITRACAL+D) 315-200 MG-UNIT per tablet Take 1 tablet by mouth 2 (two) times a day      Cholecalciferol (VITAMIN D3) 1000 units CAPS Take 1 tablet by mouth daily      famotidine (PEPCID) 20 mg tablet TAKE 1 TABLET(20 MG) BY MOUTH DAILY 90 tablet 1    metoprolol tartrate (LOPRESSOR) 25 mg tablet TAKE 1 TABLET(25 MG) BY MOUTH EVERY 12 HOURS 180 tablet 1    Multiple Vitamins-Minerals (CENTRUM ADULTS PO) Take 1 tablet by mouth daily      nitroglycerin (NITROSTAT) 0.4 mg SL tablet PLACE 1 TABLET UNDER THE TONGUE EVERY 5 MINUTES AS NEEDED FOR CHEST PAIN UP TO 3 DOSES. CALL 911 IF PAIN PERSISTS 25 tablet 1    rivaroxaban (Xarelto) 20 mg tablet TAKE 1 TABLET(20 MG) BY MOUTH DAILY WITH BREAKFAST 30 tablet 5    simvastatin (ZOCOR) 40 mg tablet TAKE 1 TABLET(40 MG) BY MOUTH DAILY 90 tablet 1     No current facility-administered medications for this visit.       SOCIAL HISTORY:  Social History     Socioeconomic History    Marital status:       "Spouse name: None    Number of children: 4    Years of education: None    Highest education level: None   Occupational History    Occupation: Retired   Tobacco Use    Smoking status: Never     Passive exposure: Never    Smokeless tobacco: Never   Vaping Use    Vaping status: Never Used   Substance and Sexual Activity    Alcohol use: Not Currently    Drug use: No    Sexual activity: Not Currently   Other Topics Concern    None   Social History Narrative    Caffeine use, active    Completed 8th grade    No caffeine use, active, per [employer]     Social Determinants of Health     Financial Resource Strain: Low Risk  (11/29/2023)    Overall Financial Resource Strain (CARDIA)     Difficulty of Paying Living Expenses: Not hard at all   Food Insecurity: Not on file   Transportation Needs: No Transportation Needs (11/29/2023)    PRAPARE - Transportation     Lack of Transportation (Medical): No     Lack of Transportation (Non-Medical): No   Physical Activity: Not on file   Stress: Not on file   Social Connections: Not on file   Intimate Partner Violence: Not on file   Housing Stability: Not on file        REVIEW OF SYSTEMS:  GENERAL: No fever or chills  HEART: No chest pain, or palpitation  RESPIRATORY:  No acute SOB or cough  GI: No Nausea, vomit or diarrhea  NEUROLOGIC: No syncope or acute weakness    PHYSICAL EXAM:    BP 98/68   Ht 5' 1\" (1.549 m)   Wt 82.1 kg (181 lb) Comment: unable to take  BMI 34.20 kg/m²     VASCULAR EXAM  Dorsalis pedis  absent, Posterior tibial artery  absent.  The patient has class findings with skin atrophy, lack of digital hair, and nail dystrophy.  There is +1 and trace lower extremity edema bilaterally.      NEUROLOGIC EXAM  Sensation is intact to light touch.  No focal neurologic deficit.          DERMATOLOGIC EXAM:   No ulcer or cellulitis noted.  The patient has hypertrophic toenails X 6 with discoloration, onycholysis, and subungal debris.  No notable skin lesion.  Patient has " hyperkeratosis X 2 in bilateral 1st met head.    MUSCULOSKELETAL EXAM:   No acute joint pain, edema, or redness.  No acute musculoskeletal problem.  Patient has deformity including hammertoes.

## 2024-03-11 ENCOUNTER — TELEPHONE (OUTPATIENT)
Age: 89
End: 2024-03-11

## 2024-03-11 DIAGNOSIS — R39.9 UTI SYMPTOMS: Primary | ICD-10-CM

## 2024-03-11 RX ORDER — CEFPODOXIME PROXETIL 100 MG/1
100 TABLET, FILM COATED ORAL 2 TIMES DAILY
Qty: 14 TABLET | Refills: 0 | Status: SHIPPED | OUTPATIENT
Start: 2024-03-11 | End: 2024-03-18

## 2024-03-11 NOTE — TELEPHONE ENCOUNTER
PT is experiencing burning when urinating and pressure. She is requesting an antibiotic that she said Dr Reyes always calls in for her.  I did inform her that she would need an appt, however the availability Dr Reyes has, she would not be able to come in today, and Dr Reyes has no availbilty tomorrow, and she is not willing to see another provider. She said if she needs a urine test, to please send a script and she would get it done at [facility].    Please Advise

## 2024-03-11 NOTE — TELEPHONE ENCOUNTER
I will send the Vantin antibiotic but she should have the urine test done before starting it. It will help to know if the bacteria is sensitive to the antibiotic since she has been on it many times.Order for the urinalysis entered.Rx sent

## 2024-03-12 ENCOUNTER — CLINICAL SUPPORT (OUTPATIENT)
Dept: INTERNAL MEDICINE CLINIC | Facility: CLINIC | Age: 89
End: 2024-03-12

## 2024-03-12 DIAGNOSIS — R39.9 UTI SYMPTOMS: Primary | ICD-10-CM

## 2024-03-12 LAB

## 2024-03-12 PROCEDURE — 87086 URINE CULTURE/COLONY COUNT: CPT | Performed by: INTERNAL MEDICINE

## 2024-03-12 PROCEDURE — 87077 CULTURE AEROBIC IDENTIFY: CPT | Performed by: INTERNAL MEDICINE

## 2024-03-12 PROCEDURE — 87186 SC STD MICRODIL/AGAR DIL: CPT | Performed by: INTERNAL MEDICINE

## 2024-03-12 PROCEDURE — 81001 URINALYSIS AUTO W/SCOPE: CPT | Performed by: INTERNAL MEDICINE

## 2024-03-14 LAB — BACTERIA UR CULT: ABNORMAL

## 2024-03-28 DIAGNOSIS — R39.9 UTI SYMPTOMS: Primary | ICD-10-CM

## 2024-03-28 RX ORDER — CEFPODOXIME PROXETIL 100 MG/1
100 TABLET, FILM COATED ORAL 2 TIMES DAILY
Qty: 14 TABLET | Refills: 0 | Status: SHIPPED | OUTPATIENT
Start: 2024-03-28 | End: 2024-04-04

## 2024-04-29 ENCOUNTER — APPOINTMENT (OUTPATIENT)
Dept: LAB | Facility: CLINIC | Age: 89
End: 2024-04-29
Payer: COMMERCIAL

## 2024-04-29 DIAGNOSIS — I25.10 CAD IN NATIVE ARTERY: ICD-10-CM

## 2024-04-29 DIAGNOSIS — I48.91 ATRIAL FIBRILLATION, UNSPECIFIED TYPE (HCC): ICD-10-CM

## 2024-04-29 DIAGNOSIS — E78.2 MIXED HYPERLIPIDEMIA: ICD-10-CM

## 2024-04-29 LAB
ALBUMIN SERPL BCP-MCNC: 3.7 G/DL (ref 3.5–5)
ALP SERPL-CCNC: 61 U/L (ref 34–104)
ALT SERPL W P-5'-P-CCNC: 9 U/L (ref 7–52)
ANION GAP SERPL CALCULATED.3IONS-SCNC: 8 MMOL/L (ref 4–13)
AST SERPL W P-5'-P-CCNC: 21 U/L (ref 13–39)
BASOPHILS # BLD AUTO: 0.05 THOUSANDS/ÂΜL (ref 0–0.1)
BASOPHILS NFR BLD AUTO: 1 % (ref 0–1)
BILIRUB SERPL-MCNC: 0.88 MG/DL (ref 0.2–1)
BUN SERPL-MCNC: 20 MG/DL (ref 5–25)
CALCIUM SERPL-MCNC: 9.6 MG/DL (ref 8.4–10.2)
CHLORIDE SERPL-SCNC: 107 MMOL/L (ref 96–108)
CHOLEST SERPL-MCNC: 160 MG/DL
CO2 SERPL-SCNC: 27 MMOL/L (ref 21–32)
CREAT SERPL-MCNC: 0.81 MG/DL (ref 0.6–1.3)
EOSINOPHIL # BLD AUTO: 0.16 THOUSAND/ÂΜL (ref 0–0.61)
EOSINOPHIL NFR BLD AUTO: 2 % (ref 0–6)
ERYTHROCYTE [DISTWIDTH] IN BLOOD BY AUTOMATED COUNT: 13.4 % (ref 11.6–15.1)
GFR SERPL CREATININE-BSD FRML MDRD: 65 ML/MIN/1.73SQ M
GLUCOSE P FAST SERPL-MCNC: 95 MG/DL (ref 65–99)
HCT VFR BLD AUTO: 39 % (ref 34.8–46.1)
HDLC SERPL-MCNC: 53 MG/DL
HGB BLD-MCNC: 12.5 G/DL (ref 11.5–15.4)
IMM GRANULOCYTES # BLD AUTO: 0.01 THOUSAND/UL (ref 0–0.2)
IMM GRANULOCYTES NFR BLD AUTO: 0 % (ref 0–2)
LDLC SERPL CALC-MCNC: 81 MG/DL (ref 0–100)
LYMPHOCYTES # BLD AUTO: 2.89 THOUSANDS/ÂΜL (ref 0.6–4.47)
LYMPHOCYTES NFR BLD AUTO: 44 % (ref 14–44)
MCH RBC QN AUTO: 31.6 PG (ref 26.8–34.3)
MCHC RBC AUTO-ENTMCNC: 32.1 G/DL (ref 31.4–37.4)
MCV RBC AUTO: 99 FL (ref 82–98)
MONOCYTES # BLD AUTO: 0.72 THOUSAND/ÂΜL (ref 0.17–1.22)
MONOCYTES NFR BLD AUTO: 11 % (ref 4–12)
NEUTROPHILS # BLD AUTO: 2.72 THOUSANDS/ÂΜL (ref 1.85–7.62)
NEUTS SEG NFR BLD AUTO: 42 % (ref 43–75)
NONHDLC SERPL-MCNC: 107 MG/DL
NRBC BLD AUTO-RTO: 0 /100 WBCS
PLATELET # BLD AUTO: 167 THOUSANDS/UL (ref 149–390)
PMV BLD AUTO: 9.6 FL (ref 8.9–12.7)
POTASSIUM SERPL-SCNC: 3.8 MMOL/L (ref 3.5–5.3)
PROT SERPL-MCNC: 6.4 G/DL (ref 6.4–8.4)
RBC # BLD AUTO: 3.96 MILLION/UL (ref 3.81–5.12)
SODIUM SERPL-SCNC: 142 MMOL/L (ref 135–147)
TRIGL SERPL-MCNC: 130 MG/DL
WBC # BLD AUTO: 6.55 THOUSAND/UL (ref 4.31–10.16)

## 2024-04-29 PROCEDURE — 80053 COMPREHEN METABOLIC PANEL: CPT

## 2024-04-29 PROCEDURE — 85025 COMPLETE CBC W/AUTO DIFF WBC: CPT

## 2024-04-29 PROCEDURE — 80061 LIPID PANEL: CPT

## 2024-04-29 PROCEDURE — 36415 COLL VENOUS BLD VENIPUNCTURE: CPT

## 2024-04-30 DIAGNOSIS — E78.2 MIXED HYPERLIPIDEMIA: ICD-10-CM

## 2024-05-02 RX ORDER — SIMVASTATIN 40 MG
TABLET ORAL
Qty: 90 TABLET | Refills: 1 | Status: SHIPPED | OUTPATIENT
Start: 2024-05-02

## 2024-05-27 DIAGNOSIS — I48.91 ATRIAL FIBRILLATION WITH RAPID VENTRICULAR RESPONSE (HCC): ICD-10-CM

## 2024-05-28 RX ORDER — RIVAROXABAN 20 MG/1
TABLET, FILM COATED ORAL
Qty: 30 TABLET | Refills: 5 | Status: SHIPPED | OUTPATIENT
Start: 2024-05-28

## 2024-05-30 ENCOUNTER — OFFICE VISIT (OUTPATIENT)
Dept: INTERNAL MEDICINE CLINIC | Facility: CLINIC | Age: 89
End: 2024-05-30
Payer: COMMERCIAL

## 2024-05-30 VITALS
OXYGEN SATURATION: 98 % | HEART RATE: 68 BPM | SYSTOLIC BLOOD PRESSURE: 124 MMHG | WEIGHT: 181.4 LBS | BODY MASS INDEX: 34.25 KG/M2 | DIASTOLIC BLOOD PRESSURE: 78 MMHG | HEIGHT: 61 IN

## 2024-05-30 DIAGNOSIS — I25.10 CAD IN NATIVE ARTERY: Primary | ICD-10-CM

## 2024-05-30 DIAGNOSIS — I48.0 PAROXYSMAL ATRIAL FIBRILLATION (HCC): ICD-10-CM

## 2024-05-30 DIAGNOSIS — E78.2 MIXED HYPERLIPIDEMIA: ICD-10-CM

## 2024-05-30 PROBLEM — I74.9 THROMBOEMBOLIC DISORDER (HCC): Status: RESOLVED | Noted: 2022-07-27 | Resolved: 2024-05-30

## 2024-05-30 PROCEDURE — G2211 COMPLEX E/M VISIT ADD ON: HCPCS | Performed by: INTERNAL MEDICINE

## 2024-05-30 PROCEDURE — 99214 OFFICE O/P EST MOD 30 MIN: CPT | Performed by: INTERNAL MEDICINE

## 2024-05-30 RX ORDER — NITROGLYCERIN 0.4 MG/1
0.4 TABLET SUBLINGUAL
Qty: 25 TABLET | Refills: 0 | Status: SHIPPED | OUTPATIENT
Start: 2024-05-30

## 2024-05-30 NOTE — PROGRESS NOTES
Ambulatory Visit  Name: Olga Ramirez      : 1935      MRN: [id number]  Encounter Provider: Lea Reyes, MD  Encounter Date: 2024   Enc[facility] department[facility]    Assessment & Plan   1. CAD in native artery  -     nitroglycerin (NITROSTAT) 0.4 mg SL tablet; Place 1 tablet (0.4 mg total) under the tongue every 5 (five) minutes as needed for chest pain  -     Lipid panel; Future; Expected date: 2024  -     Comprehensive metabolic panel; Future; Expected date: 2024  2. Paroxysmal atrial fibrillation (HCC)  Assessment & Plan:  Seen in EKG in 2022  No Afib on Holters since  Continue Xarelto  Orders:  -     Comprehensive metabolic panel; Future; Expected date: 2024  3. Mixed hyperlipidemia  Assessment & Plan:  Dietary changes  Continue simvastatin 40mg  Orders:  -     Lipid panel; Future; Expected date: 2024  -     Comprehensive metabolic panel; Future; Expected date: 2024         History of Present Illness     Here for a follow up  Feels well overall  Recent albs reviewed and lipids slightly higher  Stays active, doing her own cleaning, still able to drive      Review of Systems   Constitutional:  Negative for unexpected weight change.   Respiratory:  Negative for shortness of breath.    Cardiovascular:  Negative for chest pain, palpitations and leg swelling.   Gastrointestinal:  Negative for abdominal pain, constipation and diarrhea.   Genitourinary:  Negative for dysuria and frequency.   Neurological:  Negative for dizziness and headaches.     Past Medical History:   Diagnosis Date   • Acute bronchitis 3/17/2022   • Ambulatory dysfunction 10/17/2016   • Arthritis    • Cardiac disease    • Chest pain syndrome 2022   • Dyspnea on exertion 2022   • Hyperlipidemia    • Hypertension    • Hypertension, essential, benign 2018   • Ingrowing toenail with infection 2019   • Preop cardiovascular exam 2019   • Rash 6/3/2020   •  Stress incontinence of urine 8/11/2020   • Thrombocytopenia (HCC) 11/10/2021    Added per ICD-10 guidelines--provider accepted   • UTI (urinary tract infection) 5/31/2017   • UTI symptoms 4/3/2023     Past Surgical History:   Procedure Laterality Date   • APPENDECTOMY     • CORONARY ANGIOPLASTY WITH STENT PLACEMENT     • CYSTOSCOPY  12/29/2014    Diagnostic   • HERNIA REPAIR     • JOINT REPLACEMENT      bilat knee, bilat hip, L shoulder   • KNEE SURGERY Bilateral    • SHOULDER SURGERY  2012   • TOTAL HIP ARTHROPLASTY Bilateral      Family History   Problem Relation Age of Onset   • Breast cancer Mother 85   • Leukemia Mother    • Gout Mother    • Hypertension Mother    • Bone cancer Father 61   • Cervical cancer Sister 49   • Aneurysm Family    • Heart attack Family         myocardial infarction   • Rheum arthritis Family    • Lung cancer Sister 71   • No Known Problems Daughter    • No Known Problems Sister    • No Known Problems Daughter    • No Known Problems Daughter    • No Known Problems Son    • No Known Problems Maternal Aunt    • No Known Problems Maternal Aunt    • No Known Problems Maternal Aunt    • No Known Problems Paternal Aunt      Social History     Tobacco Use   • Smoking status: Never     Passive exposure: Never   • Smokeless tobacco: Never   Vaping Use   • Vaping status: Never Used   Substance and Sexual Activity   • Alcohol use: Not Currently   • Drug use: No   • Sexual activity: Not Currently     Current Outpatient Medications on File Prior to Visit   Medication Sig   • acetaminophen (TYLENOL) 650 mg CR tablet Take by mouth   • albuterol (ProAir HFA) 90 mcg/act inhaler Inhale 2 puffs every 6 (six) hours as needed for wheezing   • Ascorbic Acid (VITAMIN C) 1000 MG tablet Take 1,000 mg by mouth daily   • aspirin 81 MG tablet Take 81 mg by mouth daily   • calcium citrate-vitamin D (CITRACAL+D) 315-200 MG-UNIT per tablet Take 1 tablet by mouth 2 (two) times a day   • Cholecalciferol (VITAMIN D3)  "1000 units CAPS Take 1 tablet by mouth daily   • famotidine (PEPCID) 20 mg tablet TAKE 1 TABLET(20 MG) BY MOUTH DAILY   • metoprolol tartrate (LOPRESSOR) 25 mg tablet TAKE 1 TABLET(25 MG) BY MOUTH EVERY 12 HOURS   • Multiple Vitamins-Minerals (CENTRUM ADULTS PO) Take 1 tablet by mouth daily   • simvastatin (ZOCOR) 40 mg tablet TAKE 1 TABLET(40 MG) BY MOUTH DAILY   • Xarelto 20 MG tablet TAKE 1 TABLET(20 MG) BY MOUTH DAILY WITH BREAKFAST   • [DISCONTINUED] nitroglycerin (NITROSTAT) 0.4 mg SL tablet PLACE 1 TABLET UNDER THE TONGUE EVERY 5 MINUTES AS NEEDED FOR CHEST PAIN UP TO 3 DOSES. CALL 911 IF PAIN PERSISTS   • benzonatate (TESSALON PERLES) 100 mg capsule Take 1 capsule (100 mg total) by mouth 3 (three) times a day as needed for cough (Patient not taking: Reported on 5/30/2024)     Allergies   Allergen Reactions   • Cephalexin Hives   • Ciprofloxacin Hives   • Cortisone Headache   • Cortizone-10 [Hydrocortisone] Headache   • Cyclophosphamide Hives   • Dexamethasone Hives   • Erythromycin Hives   • Haemophilus B Polysaccharide Vaccine Hives   • Influenza Virus Vaccine Hives   • Iodinated Casein    • Loratadine    • Macrobid  [Nitrofurantoin Monohyd Macro] Hives   • Motrin [Ibuprofen] Itching   • Mucinex Chest Congestion Child [Guaifenesin] Itching   • Niacin Itching   • Nitrofurantoin    • Omeprazole    • Oxycodone-Acetaminophen Other (See Comments)   • Penicillins    • Percocet  [Oxycodone-Acetaminophen]    • Pneumococcal Vac Polyvalent Hives   • Povidone Iodine    • Sulfa Antibiotics      There is no immunization history for the selected administration types on file for this patient.  Objective     /78 (BP Location: Left arm, Patient Position: Sitting, Cuff Size: Standard)   Pulse 68   Ht 5' 1\" (1.549 m)   Wt 82.3 kg (181 lb 6.4 oz)   SpO2 98%   BMI 34.28 kg/m²     Physical Exam  Constitutional:       Appearance: She is not ill-appearing.   Cardiovascular:      Rate and Rhythm: Regular rhythm.      " Heart sounds: Normal heart sounds.   Pulmonary:      Breath sounds: Normal breath sounds.   Abdominal:      Palpations: Abdomen is soft.      Tenderness: There is no abdominal tenderness.   Musculoskeletal:      Cervical back: Neck supple.      Right lower leg: No edema.      Left lower leg: No edema.   Psychiatric:         Mood and Affect: Mood normal.         Behavior: Behavior normal.       Administrative Statements

## 2024-06-03 ENCOUNTER — TELEPHONE (OUTPATIENT)
Age: 89
End: 2024-06-03

## 2024-06-03 DIAGNOSIS — R39.9 UTI SYMPTOMS: Primary | ICD-10-CM

## 2024-06-03 RX ORDER — CEFPODOXIME PROXETIL 100 MG/1
100 TABLET, FILM COATED ORAL 2 TIMES DAILY
Qty: 20 TABLET | Refills: 0 | Status: SHIPPED | OUTPATIENT
Start: 2024-06-03 | End: 2024-06-13

## 2024-06-03 NOTE — TELEPHONE ENCOUNTER
Pt. Has a UTI.  She feels like she frequently must go, and pressure.  She was just there Last Tuesday 5/30 and she wants to know if Dr. Reyes could send in something to the pharmacy for her. Pt.would also like it for 10 days because 7 days does not quite do it.  Pls let the Pt. Know.    Ty

## 2024-07-09 ENCOUNTER — OFFICE VISIT (OUTPATIENT)
Dept: PODIATRY | Facility: CLINIC | Age: 89
End: 2024-07-09
Payer: COMMERCIAL

## 2024-07-09 VITALS
HEIGHT: 61 IN | BODY MASS INDEX: 34.17 KG/M2 | SYSTOLIC BLOOD PRESSURE: 135 MMHG | HEART RATE: 65 BPM | DIASTOLIC BLOOD PRESSURE: 82 MMHG | WEIGHT: 181 LBS

## 2024-07-09 DIAGNOSIS — I73.9 PERIPHERAL VASCULAR DISEASE, UNSPECIFIED (HCC): Primary | ICD-10-CM

## 2024-07-09 DIAGNOSIS — B35.1 ONYCHOMYCOSIS: ICD-10-CM

## 2024-07-09 DIAGNOSIS — L85.1 ACQUIRED KERATODERMA: ICD-10-CM

## 2024-07-09 PROCEDURE — 11721 DEBRIDE NAIL 6 OR MORE: CPT | Performed by: PODIATRIST

## 2024-07-09 PROCEDURE — 11056 PARNG/CUTG B9 HYPRKR LES 2-4: CPT | Performed by: PODIATRIST

## 2024-07-09 NOTE — PROGRESS NOTES
PATIENT:  Olga Ramirez  1935    ASSESSMENT/PLAN:  1. Peripheral vascular disease, unspecified (HCC)        2. Onychomycosis  Debridement      3. Acquired keratoderma  Debridement                 The patient was educated in proper foot wear.  Discussed daily foot assessment and proper foot care.  The patient will follow up in 9 weeks for foot exam and care.      PROCEDURE:  All mycotic toenails were reduced and debrided in length, width, and girth using a nail nipper and dremel.  All hyperkeratotic skin lesion(s) were sharply pared with a scalpel / forcep with no bleeding or evidence of ulceration.  Patient tolerated procedure(s) well without complications.    HPI:  Olga Ramirez is a 88 y.o.year old female seen for at risk foot exam and care.  The patient has class findings.  The patient complained of thick toenails and calluses.  The patient denied any acute pedal disorder or injury.      PAST MEDICAL HISTORY:  Past Medical History:   Diagnosis Date    Acute bronchitis 3/17/2022    Ambulatory dysfunction 10/17/2016    Arthritis     Cardiac disease     Chest pain syndrome 12/1/2022    Dyspnea on exertion 12/1/2022    Hyperlipidemia     Hypertension     Hypertension, essential, benign 11/6/2018    Ingrowing toenail with infection 9/26/2019    Preop cardiovascular exam 11/5/2019    Rash 6/3/2020    Stress incontinence of urine 8/11/2020    Thrombocytopenia (HCC) 11/10/2021    Added per ICD-10 guidelines--provider accepted    UTI (urinary tract infection) 5/31/2017    UTI symptoms 4/3/2023       PAST SURGICAL HISTORY:  Past Surgical History:   Procedure Laterality Date    APPENDECTOMY      CORONARY ANGIOPLASTY WITH STENT PLACEMENT      CYSTOSCOPY  12/29/2014    Diagnostic    HERNIA REPAIR      JOINT REPLACEMENT      bilat knee, bilat hip, L shoulder    KNEE SURGERY Bilateral     SHOULDER SURGERY  2012    TOTAL HIP ARTHROPLASTY Bilateral         ALLERGIES:  Cephalexin, Ciprofloxacin, Cortisone,  Cortizone-10 [hydrocortisone], Cyclophosphamide, Dexamethasone, Erythromycin, Haemophilus b polysaccharide vaccine, Influenza virus vaccine, Iodinated casein, Loratadine, Macrobid  [nitrofurantoin monohyd macro], Motrin [ibuprofen], Mucinex chest congestion child [guaifenesin], Niacin, Nitrofurantoin, Omeprazole, Oxycodone-acetaminophen, Penicillins, Percocet  [oxycodone-acetaminophen], Pneumococcal vac polyvalent, Povidone iodine, and Sulfa antibiotics    MEDICATIONS:  Current Outpatient Medications   Medication Sig Dispense Refill    acetaminophen (TYLENOL) 650 mg CR tablet Take by mouth      albuterol (ProAir HFA) 90 mcg/act inhaler Inhale 2 puffs every 6 (six) hours as needed for wheezing 8.5 g 0    Ascorbic Acid (VITAMIN C) 1000 MG tablet Take 1,000 mg by mouth daily      aspirin 81 MG tablet Take 81 mg by mouth daily      calcium citrate-vitamin D (CITRACAL+D) 315-200 MG-UNIT per tablet Take 1 tablet by mouth 2 (two) times a day      Cholecalciferol (VITAMIN D3) 1000 units CAPS Take 1 tablet by mouth daily      famotidine (PEPCID) 20 mg tablet TAKE 1 TABLET(20 MG) BY MOUTH DAILY 90 tablet 1    metoprolol tartrate (LOPRESSOR) 25 mg tablet TAKE 1 TABLET(25 MG) BY MOUTH EVERY 12 HOURS 180 tablet 1    Multiple Vitamins-Minerals (CENTRUM ADULTS PO) Take 1 tablet by mouth daily      nitroglycerin (NITROSTAT) 0.4 mg SL tablet Place 1 tablet (0.4 mg total) under the tongue every 5 (five) minutes as needed for chest pain 25 tablet 0    simvastatin (ZOCOR) 40 mg tablet TAKE 1 TABLET(40 MG) BY MOUTH DAILY 90 tablet 1    Xarelto 20 MG tablet TAKE 1 TABLET(20 MG) BY MOUTH DAILY WITH BREAKFAST 30 tablet 5    benzonatate (TESSALON PERLES) 100 mg capsule Take 1 capsule (100 mg total) by mouth 3 (three) times a day as needed for cough (Patient not taking: Reported on 5/30/2024) 20 capsule 0     No current facility-administered medications for this visit.       SOCIAL HISTORY:  Social History     Socioeconomic History     "Marital status:      Spouse name: None    Number of children: 4    Years of education: None    Highest education level: None   Occupational History    Occupation: Retired   Tobacco Use    Smoking status: Never     Passive exposure: Never    Smokeless tobacco: Never   Vaping Use    Vaping status: Never Used   Substance and Sexual Activity    Alcohol use: Not Currently    Drug use: No    Sexual activity: Not Currently   Other Topics Concern    None   Social History Narrative    Caffeine use, active    Completed 8th grade    No caffeine use, active, per [employer]     Social Determinants of Health     Financial Resource Strain: Low Risk  (11/29/2023)    Overall Financial Resource Strain (CARDIA)     Difficulty of Paying Living Expenses: Not hard at all   Food Insecurity: Not on file   Transportation Needs: No Transportation Needs (11/29/2023)    PRAPARE - Transportation     Lack of Transportation (Medical): No     Lack of Transportation (Non-Medical): No   Physical Activity: Not on file   Stress: Not on file   Social Connections: Not on file   Intimate Partner Violence: Not on file   Housing Stability: Not on file        REVIEW OF SYSTEMS:  GENERAL: No fever or chills  HEART: No chest pain, or palpitation  RESPIRATORY:  No acute SOB or cough  GI: No Nausea, vomit or diarrhea  NEUROLOGIC: No syncope or acute weakness    PHYSICAL EXAM:    /82   Pulse 65   Ht 5' 1\" (1.549 m)   Wt 82.1 kg (181 lb)   BMI 34.20 kg/m²     VASCULAR EXAM  Dorsalis pedis  absent, Posterior tibial artery  absent.  The patient has class findings with skin atrophy, lack of digital hair, and nail dystrophy.  There is +1 and trace lower extremity edema bilaterally.      NEUROLOGIC EXAM  Sensation is intact to light touch.  No focal neurologic deficit.          DERMATOLOGIC EXAM:   No ulcer or cellulitis noted.  The patient has hypertrophic toenails X 6 with discoloration, onycholysis, and subungal debris.  No notable skin lesion.  " Patient has hyperkeratosis X 2 in bilateral 1st met head.    MUSCULOSKELETAL EXAM:   No acute joint pain, edema, or redness.  No acute musculoskeletal problem.  Patient has deformity including hammertoes.

## 2024-08-21 DIAGNOSIS — I48.91 ATRIAL FIBRILLATION WITH RAPID VENTRICULAR RESPONSE (HCC): ICD-10-CM

## 2024-08-21 RX ORDER — METOPROLOL TARTRATE 25 MG/1
TABLET, FILM COATED ORAL
Qty: 180 TABLET | Refills: 1 | Status: SHIPPED | OUTPATIENT
Start: 2024-08-21

## 2024-09-10 ENCOUNTER — OFFICE VISIT (OUTPATIENT)
Dept: PODIATRY | Facility: CLINIC | Age: 89
End: 2024-09-10
Payer: COMMERCIAL

## 2024-09-10 VITALS
WEIGHT: 185 LBS | BODY MASS INDEX: 34.93 KG/M2 | DIASTOLIC BLOOD PRESSURE: 84 MMHG | HEART RATE: 80 BPM | SYSTOLIC BLOOD PRESSURE: 150 MMHG | HEIGHT: 61 IN

## 2024-09-10 DIAGNOSIS — I73.9 PERIPHERAL VASCULAR DISEASE, UNSPECIFIED (HCC): Primary | ICD-10-CM

## 2024-09-10 DIAGNOSIS — L85.1 ACQUIRED KERATODERMA: ICD-10-CM

## 2024-09-10 DIAGNOSIS — B35.1 ONYCHOMYCOSIS: ICD-10-CM

## 2024-09-10 PROCEDURE — 11056 PARNG/CUTG B9 HYPRKR LES 2-4: CPT | Performed by: PODIATRIST

## 2024-09-10 PROCEDURE — 11721 DEBRIDE NAIL 6 OR MORE: CPT | Performed by: PODIATRIST

## 2024-09-10 NOTE — PROGRESS NOTES
PATIENT:  Olga Ramirez  1935    ASSESSMENT/PLAN:  1. Peripheral vascular disease, unspecified (HCC)        2. Onychomycosis        3. Acquired keratoderma                   The patient was educated in proper foot wear.  Discussed daily foot assessment and proper foot care.  The patient will follow up in 9 weeks for foot exam and care.      PROCEDURE:  All mycotic toenails were reduced and debrided in length, width, and girth using a nail nipper and dremel.  All hyperkeratotic skin lesion(s) were sharply pared with a scalpel / forcep with no bleeding or evidence of ulceration.  Patient tolerated procedure(s) well without complications.    HPI:  Olga Ramirez is a 88 y.o.year old female seen with her daughter for at risk foot exam and care.  The patient has class findings.  The patient complained of thick toenails and calluses.  The patient denied any acute pedal disorder or injury.      PAST MEDICAL HISTORY:  Past Medical History:   Diagnosis Date    Acute bronchitis 3/17/2022    Ambulatory dysfunction 10/17/2016    Arthritis     Cardiac disease     Chest pain syndrome 12/1/2022    Dyspnea on exertion 12/1/2022    Hyperlipidemia     Hypertension     Hypertension, essential, benign 11/6/2018    Ingrowing toenail with infection 9/26/2019    Preop cardiovascular exam 11/5/2019    Rash 6/3/2020    Stress incontinence of urine 8/11/2020    Thrombocytopenia (HCC) 11/10/2021    Added per ICD-10 guidelines--provider accepted    UTI (urinary tract infection) 5/31/2017    UTI symptoms 4/3/2023       PAST SURGICAL HISTORY:  Past Surgical History:   Procedure Laterality Date    APPENDECTOMY      CORONARY ANGIOPLASTY WITH STENT PLACEMENT      CYSTOSCOPY  12/29/2014    Diagnostic    HERNIA REPAIR      JOINT REPLACEMENT      bilat knee, bilat hip, L shoulder    KNEE SURGERY Bilateral     SHOULDER SURGERY  2012    TOTAL HIP ARTHROPLASTY Bilateral         ALLERGIES:  Cephalexin, Ciprofloxacin, Cortisone, Cortizone-10  [hydrocortisone], Cyclophosphamide, Dexamethasone, Erythromycin, Haemophilus b polysaccharide vaccine, Influenza virus vaccine, Iodinated casein, Loratadine, Macrobid  [nitrofurantoin monohyd macro], Motrin [ibuprofen], Mucinex chest congestion child [guaifenesin], Niacin, Nitrofurantoin, Omeprazole, Oxycodone-acetaminophen, Penicillins, Percocet  [oxycodone-acetaminophen], Pneumococcal vac polyvalent, Povidone iodine, and Sulfa antibiotics    MEDICATIONS:  Current Outpatient Medications   Medication Sig Dispense Refill    acetaminophen (TYLENOL) 650 mg CR tablet Take by mouth      albuterol (ProAir HFA) 90 mcg/act inhaler Inhale 2 puffs every 6 (six) hours as needed for wheezing 8.5 g 0    Ascorbic Acid (VITAMIN C) 1000 MG tablet Take 1,000 mg by mouth daily      aspirin 81 MG tablet Take 81 mg by mouth daily      calcium citrate-vitamin D (CITRACAL+D) 315-200 MG-UNIT per tablet Take 1 tablet by mouth 2 (two) times a day      Cholecalciferol (VITAMIN D3) 1000 units CAPS Take 1 tablet by mouth daily      famotidine (PEPCID) 20 mg tablet TAKE 1 TABLET(20 MG) BY MOUTH DAILY 90 tablet 1    metoprolol tartrate (LOPRESSOR) 25 mg tablet TAKE 1 TABLET(25 MG) BY MOUTH EVERY 12 HOURS 180 tablet 1    Multiple Vitamins-Minerals (CENTRUM ADULTS PO) Take 1 tablet by mouth daily      mupirocin (BACTROBAN) 2 % ointment Apply topically 3 (three) times a day 15 g 0    nitroglycerin (NITROSTAT) 0.4 mg SL tablet Place 1 tablet (0.4 mg total) under the tongue every 5 (five) minutes as needed for chest pain 25 tablet 0    simvastatin (ZOCOR) 40 mg tablet TAKE 1 TABLET(40 MG) BY MOUTH DAILY 90 tablet 1    Xarelto 20 MG tablet TAKE 1 TABLET(20 MG) BY MOUTH DAILY WITH BREAKFAST 30 tablet 5    benzonatate (TESSALON PERLES) 100 mg capsule Take 1 capsule (100 mg total) by mouth 3 (three) times a day as needed for cough (Patient not taking: Reported on 5/30/2024) 20 capsule 0     No current facility-administered medications for this visit.  "      SOCIAL HISTORY:  Social History     Socioeconomic History    Marital status:      Spouse name: None    Number of children: 4    Years of education: None    Highest education level: None   Occupational History    Occupation: Retired   Tobacco Use    Smoking status: Never     Passive exposure: Never    Smokeless tobacco: Never   Vaping Use    Vaping status: Never Used   Substance and Sexual Activity    Alcohol use: Not Currently    Drug use: No    Sexual activity: Not Currently   Other Topics Concern    None   Social History Narrative    Caffeine use, active    Completed 8th grade    No caffeine use, active, per [employer]     Social Determinants of Health     Financial Resource Strain: Low Risk  (11/29/2023)    Overall Financial Resource Strain (CARDIA)     Difficulty of Paying Living Expenses: Not hard at all   Food Insecurity: Not on file   Transportation Needs: No Transportation Needs (11/29/2023)    PRAPARE - Transportation     Lack of Transportation (Medical): No     Lack of Transportation (Non-Medical): No   Physical Activity: Not on file   Stress: Not on file   Social Connections: Not on file   Intimate Partner Violence: Not on file   Housing Stability: Not on file        REVIEW OF SYSTEMS:  GENERAL: No fever or chills  HEART: No chest pain, or palpitation  RESPIRATORY:  No acute SOB or cough  GI: No Nausea, vomit or diarrhea  NEUROLOGIC: No syncope or acute weakness    PHYSICAL EXAM:    /84   Pulse 80   Ht 5' 1\" (1.549 m)   Wt 83.9 kg (185 lb)   BMI 34.96 kg/m²     VASCULAR EXAM  Dorsalis pedis  absent, Posterior tibial artery  absent.  The patient has class findings with skin atrophy, lack of digital hair, and nail dystrophy.  There is +1 and trace lower extremity edema bilaterally.      NEUROLOGIC EXAM  Sensation is intact to light touch.  No focal neurologic deficit.          DERMATOLOGIC EXAM:   No ulcer or cellulitis noted.  The patient has hypertrophic toenails X 6 with " discoloration, onycholysis, and subungal debris.  No notable skin lesion.  Patient has hyperkeratosis X 2 in bilateral 1st met head.    MUSCULOSKELETAL EXAM:   No acute joint pain, edema, or redness.  No acute musculoskeletal problem.  Patient has deformity including hammertoes.

## 2024-09-20 ENCOUNTER — TELEPHONE (OUTPATIENT)
Age: 89
End: 2024-09-20

## 2024-09-20 DIAGNOSIS — R39.9 UTI SYMPTOMS: Primary | ICD-10-CM

## 2024-09-20 RX ORDER — CEFPODOXIME PROXETIL 100 MG/1
100 TABLET, FILM COATED ORAL 2 TIMES DAILY
Qty: 20 TABLET | Refills: 0 | Status: SHIPPED | OUTPATIENT
Start: 2024-09-20 | End: 2024-09-30

## 2024-09-20 NOTE — TELEPHONE ENCOUNTER
Olga is calling to see if Dr. Reyes can send her a prescription for a UTI. Burning and pressure when urinating. No fever. She states that she gets these often and the 7 day medication doesn't help - she needs the 10 day.     Patient would like a message sent to see if medication can be ordered. Advised patient we may need to see her in the office for an appointment.     Please advise, thank you

## 2024-09-30 ENCOUNTER — OFFICE VISIT (OUTPATIENT)
Dept: INTERNAL MEDICINE CLINIC | Facility: CLINIC | Age: 89
End: 2024-09-30
Payer: COMMERCIAL

## 2024-09-30 VITALS
HEART RATE: 62 BPM | BODY MASS INDEX: 34.93 KG/M2 | OXYGEN SATURATION: 97 % | DIASTOLIC BLOOD PRESSURE: 80 MMHG | TEMPERATURE: 97.9 F | HEIGHT: 61 IN | WEIGHT: 185 LBS | SYSTOLIC BLOOD PRESSURE: 128 MMHG | RESPIRATION RATE: 20 BRPM

## 2024-09-30 DIAGNOSIS — R68.89 FLU-LIKE SYMPTOMS: Primary | ICD-10-CM

## 2024-09-30 LAB
SARS-COV-2 AG UPPER RESP QL IA: NEGATIVE
SL AMB POCT RAPID FLU A: NEGATIVE
SL AMB POCT RAPID FLU B: NEGATIVE
VALID CONTROL: NORMAL

## 2024-09-30 PROCEDURE — 87811 SARS-COV-2 COVID19 W/OPTIC: CPT

## 2024-09-30 PROCEDURE — G2211 COMPLEX E/M VISIT ADD ON: HCPCS

## 2024-09-30 PROCEDURE — 87804 INFLUENZA ASSAY W/OPTIC: CPT

## 2024-09-30 PROCEDURE — 99213 OFFICE O/P EST LOW 20 MIN: CPT

## 2024-09-30 RX ORDER — BENZONATATE 100 MG/1
100 CAPSULE ORAL 3 TIMES DAILY PRN
Qty: 20 CAPSULE | Refills: 0 | Status: SHIPPED | OUTPATIENT
Start: 2024-09-30

## 2024-09-30 RX ORDER — ALBUTEROL SULFATE 90 UG/1
2 INHALANT RESPIRATORY (INHALATION) EVERY 6 HOURS PRN
Qty: 8.5 G | Refills: 0 | Status: SHIPPED | OUTPATIENT
Start: 2024-09-30

## 2024-09-30 NOTE — PROGRESS NOTES
Ambulatory Visit  Name: Olga Ramirez      : 1935      MRN: [id number]  Encounter Provider: Gricelda Fuentes MD  Encounter Date: 2024   Encounter department: [facility]    Assessment & Plan  Flu-like symptoms  Patient continued with flulike symptoms for the last 3 days  Patient complains of cough, dry in nature, no mucus production   Patient also complains of congestion but no postnasal drip  Patient denies any fevers some occasional chills  Patient did not have any exposure to sick contacts  Patient tried taking Robitussin at home which did not improve his symptoms  Rapid flu and covid negative     PLAN  Albuterol inhaler  Tessalon Perles  Supportive care at home     Orders:    albuterol (ProAir HFA) 90 mcg/act inhaler; Inhale 2 puffs every 6 (six) hours as needed for wheezing    benzonatate (TESSALON PERLES) 100 mg capsule; Take 1 capsule (100 mg total) by mouth 3 (three) times a day as needed for cough    POCT rapid flu A and B    Poct Covid 19 Rapid Antigen Test       History of Present Illness     HPI 88-year-old female with past medical history of paroxysmal A-fib, mixed hyperlipidemia presents to the clinic with flulike symptoms for the past 3 days.  Patient complains of dry cough with no mucus production.  Patient has coughing fits when she takes a deep inspiration.  Patient denies any fevers however endorses some chills.  Patient denies any exposure to sick contacts.  Patient has tried taking Robitussin at home which did not improve her symptoms.    History obtained from : patient  Review of Systems   Constitutional:  Positive for activity change, chills and fatigue. Negative for fever.   HENT:  Positive for congestion and ear pain.    Respiratory:  Positive for cough.    Cardiovascular: Negative.    Gastrointestinal: Negative.    Genitourinary: Negative.    Musculoskeletal: Negative.    Neurological: Negative.    Psychiatric/Behavioral: Negative.       Pertinent  Medical History       Medical History Reviewed by provider this encounter:  Problems       Past Medical History   Past Medical History:   Diagnosis Date    Acute bronchitis 3/17/2022    Ambulatory dysfunction 10/17/2016    Arthritis     Cardiac disease     Chest pain syndrome 12/1/2022    Dyspnea on exertion 12/1/2022    Hyperlipidemia     Hypertension     Hypertension, essential, benign 11/6/2018    Ingrowing toenail with infection 9/26/2019    Preop cardiovascular exam 11/5/2019    Rash 6/3/2020    Stress incontinence of urine 8/11/2020    Thrombocytopenia (HCC) 11/10/2021    Added per ICD-10 guidelines--provider accepted    UTI (urinary tract infection) 5/31/2017    UTI symptoms 4/3/2023     Past Surgical History:   Procedure Laterality Date    APPENDECTOMY      CORONARY ANGIOPLASTY WITH STENT PLACEMENT      CYSTOSCOPY  12/29/2014    Diagnostic    HERNIA REPAIR      JOINT REPLACEMENT      bilat knee, bilat hip, L shoulder    KNEE SURGERY Bilateral     SHOULDER SURGERY  2012    TOTAL HIP ARTHROPLASTY Bilateral      Family History   Problem Relation Age of Onset    Breast cancer Mother 85    Leukemia Mother     Gout Mother     Hypertension Mother     Bone cancer Father 61    Cervical cancer Sister 49    Aneurysm Family     Heart attack Family         myocardial infarction    Rheum arthritis Family     Lung cancer Sister 71    No Known Problems Daughter     No Known Problems Sister     No Known Problems Daughter     No Known Problems Daughter     No Known Problems Son     No Known Problems Maternal Aunt     No Known Problems Maternal Aunt     No Known Problems Maternal Aunt     No Known Problems Paternal Aunt      Current Outpatient Medications on File Prior to Visit   Medication Sig Dispense Refill    acetaminophen (TYLENOL) 650 mg CR tablet Take by mouth      albuterol (ProAir HFA) 90 mcg/act inhaler Inhale 2 puffs every 6 (six) hours as needed for wheezing 8.5 g 0    Ascorbic Acid (VITAMIN C) 1000 MG tablet Take  1,000 mg by mouth daily      aspirin 81 MG tablet Take 81 mg by mouth daily      benzonatate (TESSALON PERLES) 100 mg capsule Take 1 capsule (100 mg total) by mouth 3 (three) times a day as needed for cough 20 capsule 0    calcium citrate-vitamin D (CITRACAL+D) 315-200 MG-UNIT per tablet Take 1 tablet by mouth 2 (two) times a day      cefpodoxime (VANTIN) 100 mg tablet Take 1 tablet (100 mg total) by mouth 2 (two) times a day for 10 days 20 tablet 0    Cholecalciferol (VITAMIN D3) 1000 units CAPS Take 1 tablet by mouth daily      famotidine (PEPCID) 20 mg tablet TAKE 1 TABLET(20 MG) BY MOUTH DAILY 90 tablet 1    metoprolol tartrate (LOPRESSOR) 25 mg tablet TAKE 1 TABLET(25 MG) BY MOUTH EVERY 12 HOURS 180 tablet 1    Multiple Vitamins-Minerals (CENTRUM ADULTS PO) Take 1 tablet by mouth daily      mupirocin (BACTROBAN) 2 % ointment Apply topically 3 (three) times a day 15 g 0    nitroglycerin (NITROSTAT) 0.4 mg SL tablet Place 1 tablet (0.4 mg total) under the tongue every 5 (five) minutes as needed for chest pain 25 tablet 0    simvastatin (ZOCOR) 40 mg tablet TAKE 1 TABLET(40 MG) BY MOUTH DAILY 90 tablet 1    Xarelto 20 MG tablet TAKE 1 TABLET(20 MG) BY MOUTH DAILY WITH BREAKFAST 30 tablet 5     No current facility-administered medications on file prior to visit.     Allergies   Allergen Reactions    Cephalexin Hives    Ciprofloxacin Hives    Cortisone Headache    Cortizone-10 [Hydrocortisone] Headache    Cyclophosphamide Hives    Dexamethasone Hives    Erythromycin Hives    Haemophilus B Polysaccharide Vaccine Hives    Influenza Virus Vaccine Hives    Iodinated Casein     Loratadine     Macrobid  [Nitrofurantoin Monohyd Macro] Hives    Motrin [Ibuprofen] Itching    Mucinex Chest Congestion Child [Guaifenesin] Itching    Niacin Itching    Nitrofurantoin     Omeprazole     Oxycodone-Acetaminophen Other (See Comments)    Penicillins     Percocet  [Oxycodone-Acetaminophen]     Pneumococcal Vac Polyvalent Hives     Povidone Iodine     Sulfa Antibiotics       Current Outpatient Medications on File Prior to Visit   Medication Sig Dispense Refill    acetaminophen (TYLENOL) 650 mg CR tablet Take by mouth      albuterol (ProAir HFA) 90 mcg/act inhaler Inhale 2 puffs every 6 (six) hours as needed for wheezing 8.5 g 0    Ascorbic Acid (VITAMIN C) 1000 MG tablet Take 1,000 mg by mouth daily      aspirin 81 MG tablet Take 81 mg by mouth daily      benzonatate (TESSALON PERLES) 100 mg capsule Take 1 capsule (100 mg total) by mouth 3 (three) times a day as needed for cough 20 capsule 0    calcium citrate-vitamin D (CITRACAL+D) 315-200 MG-UNIT per tablet Take 1 tablet by mouth 2 (two) times a day      cefpodoxime (VANTIN) 100 mg tablet Take 1 tablet (100 mg total) by mouth 2 (two) times a day for 10 days 20 tablet 0    Cholecalciferol (VITAMIN D3) 1000 units CAPS Take 1 tablet by mouth daily      famotidine (PEPCID) 20 mg tablet TAKE 1 TABLET(20 MG) BY MOUTH DAILY 90 tablet 1    metoprolol tartrate (LOPRESSOR) 25 mg tablet TAKE 1 TABLET(25 MG) BY MOUTH EVERY 12 HOURS 180 tablet 1    Multiple Vitamins-Minerals (CENTRUM ADULTS PO) Take 1 tablet by mouth daily      mupirocin (BACTROBAN) 2 % ointment Apply topically 3 (three) times a day 15 g 0    nitroglycerin (NITROSTAT) 0.4 mg SL tablet Place 1 tablet (0.4 mg total) under the tongue every 5 (five) minutes as needed for chest pain 25 tablet 0    simvastatin (ZOCOR) 40 mg tablet TAKE 1 TABLET(40 MG) BY MOUTH DAILY 90 tablet 1    Xarelto 20 MG tablet TAKE 1 TABLET(20 MG) BY MOUTH DAILY WITH BREAKFAST 30 tablet 5     No current facility-administered medications on file prior to visit.      Social History     Tobacco Use    Smoking status: Never     Passive exposure: Never    Smokeless tobacco: Never   Vaping Use    Vaping status: Never Used   Substance and Sexual Activity    Alcohol use: Not Currently    Drug use: No    Sexual activity: Not Currently         Objective     /80 (BP  "Location: Left arm, Patient Position: Sitting, Cuff Size: Standard)   Pulse 62   Temp 97.9 °F (36.6 °C) (Tympanic)   Resp 20   Ht 5' 1\" (1.549 m)   Wt 83.9 kg (185 lb)   SpO2 97%   BMI 34.96 kg/m²     Physical Exam  Constitutional:       Appearance: Normal appearance.   HENT:      Head: Normocephalic.      Right Ear: External ear normal.      Left Ear: External ear normal.      Nose: Nose normal.      Mouth/Throat:      Mouth: Mucous membranes are dry.      Pharynx: Oropharynx is clear.   Eyes:      Extraocular Movements: Extraocular movements intact.      Conjunctiva/sclera: Conjunctivae normal.      Pupils: Pupils are equal, round, and reactive to light.   Cardiovascular:      Rate and Rhythm: Normal rate and regular rhythm.      Pulses: Normal pulses.      Heart sounds: Normal heart sounds.   Pulmonary:      Effort: Pulmonary effort is normal.      Breath sounds: Normal breath sounds.   Abdominal:      General: Bowel sounds are normal.      Palpations: Abdomen is soft.   Musculoskeletal:         General: Normal range of motion.   Skin:     General: Skin is warm.      Capillary Refill: Capillary refill takes less than 2 seconds.      Comments: Biopsy site present on the right forearm, no signs of infection   Neurological:      General: No focal deficit present.      Mental Status: She is alert and oriented to person, place, and time.       Administrative Statements   I have spent a total time of 30 minutes in caring for this patient on the day of the visit/encounter including Obtaining or reviewing history  .  "

## 2024-10-07 ENCOUNTER — OFFICE VISIT (OUTPATIENT)
Dept: INTERNAL MEDICINE CLINIC | Facility: CLINIC | Age: 89
End: 2024-10-07
Payer: COMMERCIAL

## 2024-10-07 VITALS
WEIGHT: 185.2 LBS | OXYGEN SATURATION: 97 % | HEART RATE: 73 BPM | BODY MASS INDEX: 34.99 KG/M2 | SYSTOLIC BLOOD PRESSURE: 136 MMHG | TEMPERATURE: 98.8 F | DIASTOLIC BLOOD PRESSURE: 76 MMHG

## 2024-10-07 DIAGNOSIS — N30.00 ACUTE CYSTITIS WITHOUT HEMATURIA: Primary | ICD-10-CM

## 2024-10-07 DIAGNOSIS — R31.9 HEMATURIA, UNSPECIFIED TYPE: ICD-10-CM

## 2024-10-07 LAB
BACTERIA UR QL AUTO: ABNORMAL /HPF
BILIRUB UR QL STRIP: NEGATIVE
CLARITY UR: CLEAR
COLOR UR: ABNORMAL
GLUCOSE UR STRIP-MCNC: NEGATIVE MG/DL
HGB UR QL STRIP.AUTO: ABNORMAL
HYALINE CASTS #/AREA URNS LPF: ABNORMAL /LPF
KETONES UR STRIP-MCNC: NEGATIVE MG/DL
LEUKOCYTE ESTERASE UR QL STRIP: ABNORMAL
NITRITE UR QL STRIP: NEGATIVE
NON-SQ EPI CELLS URNS QL MICRO: ABNORMAL /HPF
PH UR STRIP.AUTO: 6 [PH]
PROT UR STRIP-MCNC: ABNORMAL MG/DL
RBC #/AREA URNS AUTO: ABNORMAL /HPF
SL AMB  POCT GLUCOSE, UA: ABNORMAL
SL AMB LEUKOCYTE ESTERASE,UA: ABNORMAL
SL AMB POCT BILIRUBIN,UA: ABNORMAL
SL AMB POCT BLOOD,UA: ABNORMAL
SL AMB POCT CLARITY,UA: ABNORMAL
SL AMB POCT COLOR,UA: YELLOW
SL AMB POCT KETONES,UA: ABNORMAL
SL AMB POCT NITRITE,UA: ABNORMAL
SL AMB POCT PH,UA: 6
SL AMB POCT SPECIFIC GRAVITY,UA: 1.02
SL AMB POCT URINE PROTEIN: ABNORMAL
SL AMB POCT UROBILINOGEN: 3.5
SP GR UR STRIP.AUTO: 1.01 (ref 1–1.03)
TRANS CELLS #/AREA URNS HPF: PRESENT /[HPF]
UROBILINOGEN UR STRIP-ACNC: <2 MG/DL
WBC #/AREA URNS AUTO: ABNORMAL /HPF

## 2024-10-07 PROCEDURE — 99213 OFFICE O/P EST LOW 20 MIN: CPT | Performed by: GENERAL ACUTE CARE HOSPITAL

## 2024-10-07 PROCEDURE — 81001 URINALYSIS AUTO W/SCOPE: CPT | Performed by: GENERAL ACUTE CARE HOSPITAL

## 2024-10-07 PROCEDURE — G2211 COMPLEX E/M VISIT ADD ON: HCPCS | Performed by: GENERAL ACUTE CARE HOSPITAL

## 2024-10-07 PROCEDURE — 81002 URINALYSIS NONAUTO W/O SCOPE: CPT | Performed by: GENERAL ACUTE CARE HOSPITAL

## 2024-10-07 RX ORDER — CEFPODOXIME PROXETIL 100 MG/1
100 TABLET, FILM COATED ORAL 2 TIMES DAILY
Qty: 18 TABLET | Refills: 0 | Status: SHIPPED | OUTPATIENT
Start: 2024-10-07 | End: 2024-10-16

## 2024-10-07 NOTE — ASSESSMENT & PLAN NOTE
Patient reports history of hematuria in the past.  She reports several family members also have hematuria.  We discussed that UTI can cause hematuria.  Once her UTI symptoms are cleared when she returns in December to see PCP she could have her UA repeated then

## 2024-10-07 NOTE — PROGRESS NOTES
Ambulatory Visit  Name: Olga Ramirez      : 1935      MRN: [id number]  Encounter Provider: Marina May MD  Encounter Date: 10/7/2024   Encounter department: [facility]    Assessment & Plan  Acute cystitis without hematuria  Typical UTI symptoms started yesterday.  Urine today showed blood.  She took to cefpodoxime and she has left from last UTI yesterday it could be contributing to negative UA.  Given her typical symptoms and history of recurrent UTI I will send antibiotics.  She says in the past 7-day of antibiotics does not help she usually needs 10 days.  I will send 9-day supply since she took 2 doses yesterday.  And we discussed that if symptoms resolve in between day 5 and day 9 it is okay to stop.  We discussed repeating UA to confirm resolve meant of hematuria when she comes back in December to see PCP.    Orders:    cefpodoxime (VANTIN) 100 mg tablet; Take 1 tablet (100 mg total) by mouth 2 (two) times a day for 9 days    Hematuria, unspecified type  Patient reports history of hematuria in the past.  She reports several family members also have hematuria.  We discussed that UTI can cause hematuria.  Once her UTI symptoms are cleared when she returns in December to see PCP she could have her UA repeated then            History of Present Illness     HPI    Was recently seen for cold symptoms.   Yesterday started burning and urgency     Just completed 9-day of cefpodoxime started on  for UTI. Has 2 pills left that she took yesterday.       Review of Systems        Objective     /76   Pulse 73   Temp 98.8 °F (37.1 °C) (Tympanic)   Wt 84 kg (185 lb 3.2 oz)   SpO2 97%   BMI 34.99 kg/m²     Physical Exam

## 2024-10-07 NOTE — ASSESSMENT & PLAN NOTE
Typical UTI symptoms started yesterday.  Urine today showed blood.  She took to cefpodoxime and she has left from last UTI yesterday it could be contributing to negative UA.  Given her typical symptoms and history of recurrent UTI I will send antibiotics.  She says in the past 7-day of antibiotics does not help she usually needs 10 days.  I will send 9-day supply since she took 2 doses yesterday.  And we discussed that if symptoms resolve in between day 5 and day 9 it is okay to stop.  We discussed repeating UA to confirm resolve meant of hematuria when she comes back in December to see PCP.    Orders:    cefpodoxime (VANTIN) 100 mg tablet; Take 1 tablet (100 mg total) by mouth 2 (two) times a day for 9 days

## 2024-11-06 PROBLEM — N30.00 ACUTE CYSTITIS WITHOUT HEMATURIA: Status: RESOLVED | Noted: 2024-10-07 | Resolved: 2024-11-06

## 2024-11-12 ENCOUNTER — OFFICE VISIT (OUTPATIENT)
Dept: PODIATRY | Facility: CLINIC | Age: 89
End: 2024-11-12
Payer: COMMERCIAL

## 2024-11-12 VITALS
SYSTOLIC BLOOD PRESSURE: 146 MMHG | HEART RATE: 79 BPM | BODY MASS INDEX: 34.93 KG/M2 | DIASTOLIC BLOOD PRESSURE: 79 MMHG | HEIGHT: 61 IN | WEIGHT: 185 LBS

## 2024-11-12 DIAGNOSIS — I73.9 PERIPHERAL VASCULAR DISEASE, UNSPECIFIED (HCC): Primary | ICD-10-CM

## 2024-11-12 DIAGNOSIS — L85.1 ACQUIRED KERATODERMA: ICD-10-CM

## 2024-11-12 DIAGNOSIS — B35.1 ONYCHOMYCOSIS: ICD-10-CM

## 2024-11-12 PROCEDURE — 11056 PARNG/CUTG B9 HYPRKR LES 2-4: CPT | Performed by: PODIATRIST

## 2024-11-12 PROCEDURE — 11721 DEBRIDE NAIL 6 OR MORE: CPT | Performed by: PODIATRIST

## 2024-11-12 NOTE — PROGRESS NOTES
PATIENT:  Olga Ramirez  1935    ASSESSMENT/PLAN:  1. Peripheral vascular disease, unspecified (HCC)        2. Onychomycosis        3. Acquired keratoderma                   The patient was educated in proper foot wear.  Discussed daily foot assessment and proper foot care.  The patient will follow up in 10 weeks for foot exam and care.      PROCEDURE:  All mycotic toenails were reduced and debrided in length, width, and girth using a nail nipper and dremel.  All hyperkeratotic skin lesion(s) were sharply pared with a scalpel / forcep with no bleeding or evidence of ulceration.  Patient tolerated procedure(s) well without complications.    HPI:  Olga Ramirez is a 89 y.o.year old female seen with her daughter for at risk foot exam and care.  The patient has class findings.  The patient complained of thick toenails and calluses.  The patient denied any acute pedal disorder or injury.      PAST MEDICAL HISTORY:  Past Medical History:   Diagnosis Date    Acute bronchitis 3/17/2022    Ambulatory dysfunction 10/17/2016    Arthritis     Cardiac disease     Chest pain syndrome 12/1/2022    Dyspnea on exertion 12/1/2022    Hyperlipidemia     Hypertension     Hypertension, essential, benign 11/6/2018    Ingrowing toenail with infection 9/26/2019    Preop cardiovascular exam 11/5/2019    Rash 6/3/2020    Stress incontinence of urine 8/11/2020    Thrombocytopenia (HCC) 11/10/2021    Added per ICD-10 guidelines--provider accepted    UTI (urinary tract infection) 5/31/2017    UTI symptoms 4/3/2023       PAST SURGICAL HISTORY:  Past Surgical History:   Procedure Laterality Date    APPENDECTOMY      CORONARY ANGIOPLASTY WITH STENT PLACEMENT      CYSTOSCOPY  12/29/2014    Diagnostic    HERNIA REPAIR      JOINT REPLACEMENT      bilat knee, bilat hip, L shoulder    KNEE SURGERY Bilateral     SHOULDER SURGERY  2012    TOTAL HIP ARTHROPLASTY Bilateral         ALLERGIES:  Cephalexin, Ciprofloxacin, Cortisone, Cortizone-10  [hydrocortisone], Cyclophosphamide, Dexamethasone, Erythromycin, Haemophilus b polysaccharide vaccine, Influenza virus vaccine, Iodinated casein, Loratadine, Macrobid  [nitrofurantoin monohyd macro], Motrin [ibuprofen], Mucinex chest congestion child [guaifenesin], Niacin, Nitrofurantoin, Omeprazole, Oxycodone-acetaminophen, Penicillins, Percocet  [oxycodone-acetaminophen], Pneumococcal vac polyvalent, Povidone iodine, and Sulfa antibiotics    MEDICATIONS:  Current Outpatient Medications   Medication Sig Dispense Refill    acetaminophen (TYLENOL) 650 mg CR tablet Take by mouth      albuterol (ProAir HFA) 90 mcg/act inhaler Inhale 2 puffs every 6 (six) hours as needed for wheezing 8.5 g 0    albuterol (ProAir HFA) 90 mcg/act inhaler Inhale 2 puffs every 6 (six) hours as needed for wheezing 8.5 g 0    Ascorbic Acid (VITAMIN C) 1000 MG tablet Take 1,000 mg by mouth daily      aspirin 81 MG tablet Take 81 mg by mouth daily      benzonatate (TESSALON PERLES) 100 mg capsule Take 1 capsule (100 mg total) by mouth 3 (three) times a day as needed for cough 20 capsule 0    benzonatate (TESSALON PERLES) 100 mg capsule Take 1 capsule (100 mg total) by mouth 3 (three) times a day as needed for cough 20 capsule 0    calcium citrate-vitamin D (CITRACAL+D) 315-200 MG-UNIT per tablet Take 1 tablet by mouth 2 (two) times a day      Cholecalciferol (VITAMIN D3) 1000 units CAPS Take 1 tablet by mouth daily      famotidine (PEPCID) 20 mg tablet TAKE 1 TABLET(20 MG) BY MOUTH DAILY 90 tablet 1    metoprolol tartrate (LOPRESSOR) 25 mg tablet TAKE 1 TABLET(25 MG) BY MOUTH EVERY 12 HOURS 180 tablet 1    Multiple Vitamins-Minerals (CENTRUM ADULTS PO) Take 1 tablet by mouth daily      mupirocin (BACTROBAN) 2 % ointment Apply topically 3 (three) times a day 15 g 0    nitroglycerin (NITROSTAT) 0.4 mg SL tablet Place 1 tablet (0.4 mg total) under the tongue every 5 (five) minutes as needed for chest pain 25 tablet 0    simvastatin (ZOCOR) 40 mg  "tablet TAKE 1 TABLET(40 MG) BY MOUTH DAILY 90 tablet 1    Xarelto 20 MG tablet TAKE 1 TABLET(20 MG) BY MOUTH DAILY WITH BREAKFAST 30 tablet 5     No current facility-administered medications for this visit.       SOCIAL HISTORY:  Social History     Socioeconomic History    Marital status:      Spouse name: None    Number of children: 4    Years of education: None    Highest education level: None   Occupational History    Occupation: Retired   Tobacco Use    Smoking status: Never     Passive exposure: Never    Smokeless tobacco: Never   Vaping Use    Vaping status: Never Used   Substance and Sexual Activity    Alcohol use: Not Currently    Drug use: No    Sexual activity: Not Currently   Other Topics Concern    None   Social History Narrative    Caffeine use, active    Completed 8th grade    No caffeine use, active, per [employer]     Social Determinants of Health     Financial Resource Strain: Low Risk  (11/29/2023)    Overall Financial Resource Strain (CARDIA)     Difficulty of Paying Living Expenses: Not hard at all   Food Insecurity: Not on file   Transportation Needs: No Transportation Needs (11/29/2023)    PRAPARE - Transportation     Lack of Transportation (Medical): No     Lack of Transportation (Non-Medical): No   Physical Activity: Not on file   Stress: Not on file   Social Connections: Not on file   Intimate Partner Violence: Not on file   Housing Stability: Not on file        REVIEW OF SYSTEMS:  GENERAL: No fever or chills  HEART: No chest pain, or palpitation  RESPIRATORY:  No acute SOB or cough  GI: No Nausea, vomit or diarrhea  NEUROLOGIC: No syncope or acute weakness    PHYSICAL EXAM:    /79   Pulse 79   Ht 5' 1\" (1.549 m)   Wt 83.9 kg (185 lb)   BMI 34.96 kg/m²     VASCULAR EXAM  Dorsalis pedis  absent, Posterior tibial artery  absent.  The patient has class findings with skin atrophy, lack of digital hair, and nail dystrophy.  There is +1 and trace lower extremity edema " bilaterally.      NEUROLOGIC EXAM  Sensation is intact to light touch.  No focal neurologic deficit.          DERMATOLOGIC EXAM:   No ulcer or cellulitis noted.  The patient has hypertrophic toenails X 6 with discoloration, onycholysis, and subungal debris.  No notable skin lesion.  Patient has hyperkeratosis X 2 in bilateral 1st met head.    MUSCULOSKELETAL EXAM:   No acute joint pain, edema, or redness.  No acute musculoskeletal problem.  Patient has deformity including hammertoes.

## 2024-11-18 ENCOUNTER — TELEPHONE (OUTPATIENT)
Age: 89
End: 2024-11-18

## 2024-11-18 DIAGNOSIS — R39.9 UTI SYMPTOMS: Primary | ICD-10-CM

## 2024-11-18 NOTE — TELEPHONE ENCOUNTER
She should have a urinalysis before starting any antibiotic because she just finished a course of an antibiotic 3 weeks ago.  Order entered.  When she submits the urine sample, she can take over-the-counter Azo.  I will start an antibiotic based on the urine culture.  I would like to limit her use of antibiotic because she has many allergies and she will develop resistance to the only antibiotic she can take.

## 2024-11-18 NOTE — TELEPHONE ENCOUNTER
Olga has a UTI with a lot of burning. She wanted to have cefpodoxime (VANTIN) 100 mg tablet called in for a 10 day course, which she used previoulsy I called her back and left a message because she probably should come in for a same day appointment. Please call patient and advise if she needs to come in if she doesn't call back. Thank you.

## 2024-11-18 NOTE — TELEPHONE ENCOUNTER
Patient called back and apt offered.  She does not want to come in for an apt. Can we send medication without her being seen?

## 2024-11-20 ENCOUNTER — APPOINTMENT (OUTPATIENT)
Dept: LAB | Facility: CLINIC | Age: 89
End: 2024-11-20
Payer: COMMERCIAL

## 2024-11-20 ENCOUNTER — RESULTS FOLLOW-UP (OUTPATIENT)
Dept: INTERNAL MEDICINE CLINIC | Facility: CLINIC | Age: 89
End: 2024-11-20

## 2024-11-20 DIAGNOSIS — R39.9 UTI SYMPTOMS: ICD-10-CM

## 2024-11-20 LAB

## 2024-11-20 PROCEDURE — 87086 URINE CULTURE/COLONY COUNT: CPT

## 2024-11-20 PROCEDURE — 81001 URINALYSIS AUTO W/SCOPE: CPT

## 2024-11-20 NOTE — TELEPHONE ENCOUNTER
Patient was unsure if she should get her urine tested as she takes a cranberry pill every day as her normal routine. Attempted to clarify with office clinical who hung up before I could ask the question.     Called Pod Nurse line who advised patient should go today regardless of being on the cranberry supplement.

## 2024-11-21 LAB — BACTERIA UR CULT: NORMAL

## 2024-11-22 DIAGNOSIS — E78.2 MIXED HYPERLIPIDEMIA: ICD-10-CM

## 2024-11-22 DIAGNOSIS — K21.9 GASTROESOPHAGEAL REFLUX DISEASE: ICD-10-CM

## 2024-11-22 DIAGNOSIS — I48.91 ATRIAL FIBRILLATION WITH RAPID VENTRICULAR RESPONSE (HCC): ICD-10-CM

## 2024-11-22 RX ORDER — FAMOTIDINE 20 MG/1
20 TABLET, FILM COATED ORAL DAILY
Qty: 90 TABLET | Refills: 1 | Status: SHIPPED | OUTPATIENT
Start: 2024-11-22

## 2024-11-22 RX ORDER — SIMVASTATIN 40 MG
40 TABLET ORAL DAILY
Qty: 90 TABLET | Refills: 1 | Status: SHIPPED | OUTPATIENT
Start: 2024-11-22

## 2024-11-22 NOTE — TELEPHONE ENCOUNTER
Patient called to get the results to her urine culture, I advised that the results are still pending at the moment. Patient is getting worried and would like a call as soon as the results are back. Thank you.

## 2024-11-22 NOTE — TELEPHONE ENCOUNTER
Patient is in need of   Requested Prescriptions     Pending Prescriptions Disp Refills    simvastatin (ZOCOR) 40 mg tablet 90 tablet 1     Sig: Take 1 tablet (40 mg total) by mouth daily    famotidine (PEPCID) 20 mg tablet 90 tablet 1     Sig: Take 1 tablet (20 mg total) by mouth daily    rivaroxaban (Xarelto) 20 mg tablet 30 tablet 5     To the [facility] #[id number]

## 2024-11-25 ENCOUNTER — RA CDI HCC (OUTPATIENT)
Dept: OTHER | Facility: HOSPITAL | Age: 89
End: 2024-11-25

## 2024-11-25 NOTE — TELEPHONE ENCOUNTER
Pt called in stating she did her urinalysis and the results are in. She is having a lot of pressure and doesn't want to go into the holiday this way. Verified pharmacy with pt    Please advise, thank you

## 2024-11-25 NOTE — TELEPHONE ENCOUNTER
Please call that her urine did not grow any specific bacteria to cause a urinary tract infection.  She should try over-the-counter Azo if she has not done so.

## 2024-11-26 ENCOUNTER — TELEPHONE (OUTPATIENT)
Age: 89
End: 2024-11-26

## 2024-11-26 DIAGNOSIS — N30.00 ACUTE CYSTITIS WITHOUT HEMATURIA: ICD-10-CM

## 2024-11-26 DIAGNOSIS — N39.0 RECURRENT UTI: Primary | ICD-10-CM

## 2024-11-26 RX ORDER — CEFPODOXIME PROXETIL 100 MG/1
100 TABLET, FILM COATED ORAL 2 TIMES DAILY
Qty: 20 TABLET | Refills: 0 | Status: SHIPPED | OUTPATIENT
Start: 2024-11-26 | End: 2024-12-06

## 2024-11-26 NOTE — TELEPHONE ENCOUNTER
Patient called today to establish care for Recurrent UTI in [facility].    Pt is scheduled on 12/23 at 1:40 PM with ERICA Montgomery. The office's address was provided.    Pt states she had a UTI 3 weeks ago, and completed a course of ABX and has UTI again.

## 2024-11-26 NOTE — TELEPHONE ENCOUNTER
Spoke with patient who continues to feel significant bladder pressure keeping her up all night with dysuria.  Her urine culture grew mixed contaminants but she has a history of recurrent E. coli UTI.  She finished a round of Vantin about 4 to 5 weeks ago.  I will send another round but also want her to reestablish with urology which she saw in 2013.  Her cystoscopy was unremarkable then.  She is agreeing to the plan and will make an appointment.

## 2024-12-02 ENCOUNTER — RESULTS FOLLOW-UP (OUTPATIENT)
Dept: INTERNAL MEDICINE CLINIC | Facility: CLINIC | Age: 89
End: 2024-12-02

## 2024-12-02 ENCOUNTER — HOSPITAL ENCOUNTER (OUTPATIENT)
Dept: RADIOLOGY | Facility: HOSPITAL | Age: 89
Discharge: HOME | End: 2024-12-02
Payer: COMMERCIAL

## 2024-12-02 ENCOUNTER — APPOINTMENT (OUTPATIENT)
Dept: LAB | Facility: CLINIC | Age: 89
End: 2024-12-02
Payer: COMMERCIAL

## 2024-12-02 ENCOUNTER — OFFICE VISIT (OUTPATIENT)
Dept: INTERNAL MEDICINE CLINIC | Facility: CLINIC | Age: 89
End: 2024-12-02
Payer: COMMERCIAL

## 2024-12-02 VITALS
WEIGHT: 184.4 LBS | HEART RATE: 64 BPM | BODY MASS INDEX: 34.81 KG/M2 | HEIGHT: 61 IN | SYSTOLIC BLOOD PRESSURE: 112 MMHG | OXYGEN SATURATION: 96 % | DIASTOLIC BLOOD PRESSURE: 70 MMHG

## 2024-12-02 DIAGNOSIS — J06.9 VIRAL UPPER RESPIRATORY TRACT INFECTION: ICD-10-CM

## 2024-12-02 DIAGNOSIS — J06.9 VIRAL UPPER RESPIRATORY TRACT INFECTION: Primary | ICD-10-CM

## 2024-12-02 DIAGNOSIS — N39.0 RECURRENT UTI: ICD-10-CM

## 2024-12-02 DIAGNOSIS — Z00.00 MEDICARE ANNUAL WELLNESS VISIT, SUBSEQUENT: ICD-10-CM

## 2024-12-02 PROBLEM — R68.89 FLU-LIKE SYMPTOMS: Status: RESOLVED | Noted: 2024-09-30 | Resolved: 2024-12-02

## 2024-12-02 PROCEDURE — 99214 OFFICE O/P EST MOD 30 MIN: CPT | Performed by: INTERNAL MEDICINE

## 2024-12-02 PROCEDURE — 71046 X-RAY EXAM CHEST 2 VIEWS: CPT

## 2024-12-02 PROCEDURE — G0439 PPPS, SUBSEQ VISIT: HCPCS | Performed by: INTERNAL MEDICINE

## 2024-12-02 RX ORDER — BENZONATATE 100 MG/1
100 CAPSULE ORAL 3 TIMES DAILY PRN
Qty: 20 CAPSULE | Refills: 0 | Status: SHIPPED | OUTPATIENT
Start: 2024-12-02

## 2024-12-02 NOTE — PROGRESS NOTES
Name: Olga Ramirez      : 1935      MRN: [id number]  Encounter Provider: Lea Reyes, MD  Encounter Date: 2024   Encounter department: [facility]    Assessment & Plan  Viral upper respiratory tract infection  About 5 days of cold symptoms-nasal congestion and cough  Wheezing on exam in the lower lung fields with rales  She is on Vantin for a UTI at this time  Obtain x-ray and discussed doxycycline if this shows pneumonia  May need inpatient treatment if she is not able to tolerate doxycycline  Multiple drug allergies including azithromycin  Orders:    XR chest pa and lateral; Future    benzonatate (TESSALON PERLES) 100 mg capsule; Take 1 capsule (100 mg total) by mouth 3 (three) times a day as needed for cough    Recurrent UTI  Currently on Vantin and symptoms resolving  Most recent urine culture grew mixed contaminants  Multiple antibiotic allergies and seems to be experiencing UTI symptoms more frequently  Urged to reestablish with urology and has an appointment in a few weeks     Medicare annual wellness visit, subsequent            Preventive health issues were discussed with patient, and age appropriate screening tests were ordered as noted in patient's After Visit Summary. Personalized health advice and appropriate referrals for health education or preventive services given if needed, as noted in patient's After Visit Summary.    History of Present Illness     Started with cold symptoms 5 days ago-nasal congestion, cough, wheezing  No fever, COVID-negative  Taking benzonatate, Robitussin DM       Patient Care Team:  Lea Reyes, MD as PCP - General  DO Morro Schafer MD John Hratko, MD Luis A Tejada, MD John Boscia Od (Optometry)    Review of Systems   Constitutional:  Negative for chills and fever.   HENT:  Positive for congestion. Negative for sore throat.    Respiratory:  Positive for cough, chest tightness, shortness of breath and  wheezing.    Cardiovascular:  Negative for chest pain and palpitations.   Gastrointestinal:  Negative for abdominal pain, constipation and diarrhea.   Genitourinary:  Negative for dysuria and pelvic pain.     Medical History Reviewed by provider this encounter:  Tobacco  Allergies  Meds  Problems  Med Hx  Surg Hx  Fam Hx       Annual Wellness Visit Questionnaire   Olga is here for her Subsequent Wellness visit.     Health Risk Assessment:   Patient rates overall health as very good. Patient feels that their physical health rating is same. Patient is satisfied with their life. Eyesight was rated as same. Hearing was rated as slightly worse. Patient feels that their emotional and mental health rating is same. Patients states they are never, rarely angry. Patient states they are never, rarely unusually tired/fatigued. Pain experienced in the last 7 days has been some. Patient's pain rating has been 10/10. Patient states that she has experienced no weight loss or gain in last 6 months.     Depression Screening:   PHQ-2 Score: 0      Fall Risk Screening:   In the past year, patient has experienced: no history of falling in past year      Urinary Incontinence Screening:   Patient has leaked urine accidently in the last six months.     Home Safety:  Patient has trouble with stairs inside or outside of their home. Patient has working smoke alarms and has working carbon monoxide detector. Home safety hazards include: none.     Nutrition:   Current diet is Regular.     Medications:   Patient is currently taking over-the-counter supplements. OTC medications include: see medication list. Patient is able to manage medications.     Activities of Daily Living (ADLs)/Instrumental Activities of Daily Living (IADLs):   Walk and transfer into and out of bed and chair?: Yes  Dress and groom yourself?: Yes    Bathe or shower yourself?: Yes    Feed yourself? Yes  Do your laundry/housekeeping?: Yes  Manage your money, pay your  bills and track your expenses?: Yes  Make your own meals?: Yes    Do your own shopping?: Yes    Previous Hospitalizations:   Any hospitalizations or ED visits within the last 12 months?: No      Cognitive Screening:   Provider or family/friend/caregiver concerned regarding cognition?: No    PREVENTIVE SCREENINGS      Cardiovascular Screening:    General: Screening Not Indicated and History Lipid Disorder      Diabetes Screening:     General: Screening Current      Colorectal Cancer Screening:     General: Screening Not Indicated      Breast Cancer Screening:     General: Screening Not Indicated      Cervical Cancer Screening:    General: Screening Not Indicated      Osteoporosis Screening:    General: Screening Not Indicated and History Osteoporosis      Abdominal Aortic Aneurysm (AAA) Screening:        General: Screening Not Indicated      Lung Cancer Screening:     General: Screening Not Indicated      Hepatitis C Screening:    General: Screening Not Indicated    Social Drivers of Health     Financial Resource Strain: Low Risk  (11/29/2023)    Overall Financial Resource Strain (CARDIA)     Difficulty of Paying Living Expenses: Not hard at all   Food Insecurity: No Food Insecurity (12/2/2024)    Hunger Vital Sign     Worried About Running Out of Food in the Last Year: Never true     Ran Out of Food in the Last Year: Never true   Transportation Needs: No Transportation Needs (12/2/2024)    PRAPARE - Transportation     Lack of Transportation (Medical): No     Lack of Transportation (Non-Medical): No   Housing Stability: Low Risk  (12/2/2024)    Housing Stability Vital Sign     Unable to Pay for Housing in the Last Year: No     Number of Times Moved in the Last Year: 0     Homeless in the Last Year: No   Utilities: Not At Risk (12/2/2024)    [facility] Utilities     Threatened with loss of utilities: No     No results found.    Objective   /70 (BP Location: Right arm, Patient Position: Sitting, Cuff Size: Standard)    "Pulse 64   Ht 5' 1\" (1.549 m)   Wt 83.6 kg (184 lb 6.4 oz)   SpO2 96%   BMI 34.84 kg/m²     Physical Exam  Constitutional:       General: She is not in acute distress.     Appearance: She is well-developed. She is not ill-appearing, toxic-appearing or diaphoretic.   HENT:      Right Ear: External ear normal. There is no impacted cerumen.      Left Ear: External ear normal. There is no impacted cerumen.   Eyes:      Conjunctiva/sclera: Conjunctivae normal.   Cardiovascular:      Rate and Rhythm: Normal rate and regular rhythm.      Heart sounds: Normal heart sounds. No murmur heard.  Pulmonary:      Effort: Pulmonary effort is normal. No respiratory distress.      Breath sounds: No stridor. Examination of the right-lower field reveals wheezing and rales. Examination of the left-lower field reveals wheezing and rales. Wheezing and rales present.   Abdominal:      General: There is no distension.      Palpations: Abdomen is soft. There is no mass.      Tenderness: There is no abdominal tenderness. There is no guarding or rebound.   Musculoskeletal:      Cervical back: Neck supple.      Right lower leg: No edema.      Left lower leg: No edema.   Neurological:      Mental Status: She is alert and oriented to person, place, and time.   Psychiatric:         Mood and Affect: Mood normal.         Behavior: Behavior normal.         Thought Content: Thought content normal.         Judgment: Judgment normal.         "

## 2024-12-02 NOTE — PATIENT INSTRUCTIONS
Medicare Preventive Visit Patient Instructions  Thank you for completing your Welcome to Medicare Visit or Medicare Annual Wellness Visit today. Your next wellness visit will be due in one year (12/3/2025).  The screening/preventive services that you may require over the next 5-10 years are detailed below. Some tests may not apply to you based off risk factors and/or age. Screening tests ordered at today's visit but not completed yet may show as past due. Also, please note that scanned in results may not display below.  Preventive Screenings:  Service Recommendations Previous Testing/Comments   Colorectal Cancer Screening  * Colonoscopy    * Fecal Occult Blood Test (FOBT)/Fecal Immunochemical Test (FIT)  * Fecal DNA/Cologuard Test  * Flexible Sigmoidoscopy Age: 45-75 years old   Colonoscopy: every 10 years (may be performed more frequently if at higher risk)  OR  FOBT/FIT: every 1 year  OR  Cologuard: every 3 years  OR  Sigmoidoscopy: every 5 years  Screening may be recommended earlier than age 45 if at higher risk for colorectal cancer. Also, an individualized decision between you and your healthcare provider will decide whether screening between the ages of 76-85 would be appropriate. Colonoscopy: Not on file  FOBT/FIT: Not on file  Cologuard: Not on file  Sigmoidoscopy: Not on file          Breast Cancer Screening Age: 40+ years old  Frequency: every 1-2 years  Not required if history of left and right mastectomy Mammogram: 08/18/2020        Cervical Cancer Screening Between the ages of 21-29, pap smear recommended once every 3 years.   Between the ages of 30-65, can perform pap smear with HPV co-testing every 5 years.   Recommendations may differ for women with a history of total hysterectomy, cervical cancer, or abnormal pap smears in past. Pap Smear: Not on file        Hepatitis C Screening Once for adults born between 1945 and 1965  More frequently in patients at high risk for Hepatitis C Hep C Antibody: Not  on file        Diabetes Screening 1-2 times per year if you're at risk for diabetes or have pre-diabetes Fasting glucose: 95 mg/dL (4/29/2024)  A1C: 5.3 % (7/8/2021)      Cholesterol Screening Once every 5 years if you don't have a lipid disorder. May order more often based on risk factors. Lipid panel: 04/29/2024          Other Preventive Screenings Covered by Medicare:  Abdominal Aortic Aneurysm (AAA) Screening: covered once if your at risk. You're considered to be at risk if you have a family history of AAA.  Lung Cancer Screening: covers low dose CT scan once per year if you meet all of the following conditions: (1) Age 55-77; (2) No signs or symptoms of lung cancer; (3) Current smoker or have quit smoking within the last 15 years; (4) You have a tobacco smoking history of at least 20 pack years (packs per day multiplied by number of years you smoked); (5) You get a written order from a healthcare provider.  Glaucoma Screening: covered annually if you're considered high risk: (1) You have diabetes OR (2) Family history of glaucoma OR (3)  aged 50 and older OR (4)  American aged 65 and older  Osteoporosis Screening: covered every 2 years if you meet one of the following conditions: (1) You're estrogen deficient and at risk for osteoporosis based off medical history and other findings; (2) Have a vertebral abnormality; (3) On glucocorticoid therapy for more than 3 months; (4) Have primary hyperparathyroidism; (5) On osteoporosis medications and need to assess response to drug therapy.   Last bone density test (DXA Scan): 04/08/2016.  HIV Screening: covered annually if you're between the age of 15-65. Also covered annually if you are younger than 15 and older than 65 with risk factors for HIV infection. For pregnant patients, it is covered up to 3 times per pregnancy.    Immunizations:  Immunization Recommendations   Influenza Vaccine Annual influenza vaccination during flu season is  recommended for all persons aged >= 6 months who do not have contraindications   Pneumococcal Vaccine   * Pneumococcal conjugate vaccine = PCV13 (Prevnar 13), PCV15 (Vaxneuvance), PCV20 (Prevnar 20)  * Pneumococcal polysaccharide vaccine = PPSV23 (Pneumovax) Adults 19-63 yo with certain risk factors or if 65+ yo  If never received any pneumonia vaccine: recommend Prevnar 20 (PCV20)  Give PCV20 if previously received 1 dose of PCV13 or PPSV23   Hepatitis B Vaccine 3 dose series if at intermediate or high risk (ex: diabetes, end stage renal disease, liver disease)   Respiratory syncytial virus (RSV) Vaccine - COVERED BY MEDICARE PART D  * RSVPreF3 (Arexvy) CDC recommends that adults 60 years of age and older may receive a single dose of RSV vaccine using shared clinical decision-making (SCDM)   Tetanus (Td) Vaccine - COST NOT COVERED BY MEDICARE PART B Following completion of primary series, a booster dose should be given every 10 years to maintain immunity against tetanus. Td may also be given as tetanus wound prophylaxis.   Tdap Vaccine - COST NOT COVERED BY MEDICARE PART B Recommended at least once for all adults. For pregnant patients, recommended with each pregnancy.   Shingles Vaccine (Shingrix) - COST NOT COVERED BY MEDICARE PART B  2 shot series recommended in those 19 years and older who have or will have weakened immune systems or those 50 years and older     Health Maintenance Due:  There are no preventive care reminders to display for this patient.  Immunizations Due:      Topic Date Due   • Pneumococcal Vaccine: 65+ Years (1 of 1 - PCV) Never done   • Influenza Vaccine (1) Never done   • COVID-19 Vaccine (1 - 2024-25 season) Never done     Advance Directives   What are advance directives?  Advance directives are legal documents that state your wishes and plans for medical care. These plans are made ahead of time in case you lose your ability to make decisions for yourself. Advance directives can apply to  any medical decision, such as the treatments you want, and if you want to donate organs.   What are the types of advance directives?  There are many types of advance directives, and each state has rules about how to use them. You may choose a combination of any of the following:  Living will:  This is a written record of the treatment you want. You can also choose which treatments you do not want, which to limit, and which to stop at a certain time. This includes surgery, medicine, IV fluid, and tube feedings.   Durable power of  for healthcare (DPAHC):  This is a written record that states who you want to make healthcare choices for you when you are unable to make them for yourself. This person, called a proxy, is usually a family member or a friend. You may choose more than 1 proxy.  Do not resuscitate (DNR) order:  A DNR order is used in case your heart stops beating or you stop breathing. It is a request not to have certain forms of treatment, such as CPR. A DNR order may be included in other types of advance directives.  Medical directive:  This covers the care that you want if you are in a coma, near death, or unable to make decisions for yourself. You can list the treatments you want for each condition. Treatment may include pain medicine, surgery, blood transfusions, dialysis, IV or tube feedings, and a ventilator (breathing machine).  Values history:  This document has questions about your views, beliefs, and how you feel and think about life. This information can help others choose the care that you would choose.  Why are advance directives important?  An advance directive helps you control your care. Although spoken wishes may be used, it is better to have your wishes written down. Spoken wishes can be misunderstood, or not followed. Treatments may be given even if you do not want them. An advance directive may make it easier for your family to make difficult choices about your care.   Urinary  Incontinence   Urinary incontinence (UI)  is when you lose control of your bladder. UI develops because your bladder cannot store or empty urine properly. The 3 most common types of UI are stress incontinence, urge incontinence, or both.  Medicines:   May be given to help strengthen your bladder control. Report any side effects of medication to your healthcare provider.  Do pelvic muscle exercises often:  Your pelvic muscles help you stop urinating. Squeeze these muscles tight for 5 seconds, then relax for 5 seconds. Gradually work up to squeezing for 10 seconds. Do 3 sets of 15 repetitions a day, or as directed. This will help strengthen your pelvic muscles and improve bladder control.  Train your bladder:  Go to the bathroom at set times, such as every 2 hours, even if you do not feel the urge to go. You can also try to hold your urine when you feel the urge to go. For example, hold your urine for 5 minutes when you feel the urge to go. As that becomes easier, hold your urine for 10 minutes.   Self-care:   Keep a UI record.  Write down how often you leak urine and how much you leak. Make a note of what you were doing when you leaked urine.  Drink liquids as directed. You may need to limit the amount of liquid you drink to help control your urine leakage. Do not drink any liquid right before you go to bed. Limit or do not have drinks that contain caffeine or alcohol.   Prevent constipation.  Eat a variety of high-fiber foods. Good examples are high-fiber cereals, beans, vegetables, and whole-grain breads. Walking is the best way to trigger your intestines to have a bowel movement.  Exercise regularly and maintain a healthy weight.  Weight loss and exercise will decrease pressure on your bladder and help you control your leakage.   Use a catheter as directed  to help empty your bladder. A catheter is a tiny, plastic tube that is put into your bladder to drain your urine.   Go to behavior therapy as directed.   Behavior therapy may be used to help you learn to control your urge to urinate.    Weight Management   Why it is important to manage your weight:  Being overweight increases your risk of health conditions such as heart disease, high blood pressure, type 2 diabetes, and certain types of cancer. It can also increase your risk for osteoarthritis, sleep apnea, and other respiratory problems. Aim for a slow, steady weight loss. Even a small amount of weight loss can lower your risk of health problems.  How to lose weight safely:  A safe and healthy way to lose weight is to eat fewer calories and get regular exercise. You can lose up about 1 pound a week by decreasing the number of calories you eat by 500 calories each day.   Healthy meal plan for weight management:  A healthy meal plan includes a variety of foods, contains fewer calories, and helps you stay healthy. A healthy meal plan includes the following:  Eat whole-grain foods more often.  A healthy meal plan should contain fiber. Fiber is the part of grains, fruits, and vegetables that is not broken down by your body. Whole-grain foods are healthy and provide extra fiber in your diet. Some examples of whole-grain foods are whole-wheat breads and pastas, oatmeal, brown rice, and bulgur.  Eat a variety of vegetables every day.  Include dark, leafy greens such as spinach, kale, ernesto greens, and mustard greens. Eat yellow and orange vegetables such as carrots, sweet potatoes, and winter squash.   Eat a variety of fruits every day.  Choose fresh or canned fruit (canned in its own juice or light syrup) instead of juice. Fruit juice has very little or no fiber.  Eat low-fat dairy foods.  Drink fat-free (skim) milk or 1% milk. Eat fat-free yogurt and low-fat cottage cheese. Try low-fat cheeses such as mozzarella and other reduced-fat cheeses.  Choose meat and other protein foods that are low in fat.  Choose beans or other legumes such as split peas or lentils. Choose  fish, skinless poultry (chicken or turkey), or lean cuts of red meat (beef or pork). Before you cook meat or poultry, cut off any visible fat.   Use less fat and oil.  Try baking foods instead of frying them. Add less fat, such as margarine, sour cream, regular salad dressing and mayonnaise to foods. Eat fewer high-fat foods. Some examples of high-fat foods include french fries, doughnuts, ice cream, and cakes.  Eat fewer sweets.  Limit foods and drinks that are high in sugar. This includes candy, cookies, regular soda, and sweetened drinks.  Exercise:  Exercise at least 30 minutes per day on most days of the week. Some examples of exercise include walking, biking, dancing, and swimming. You can also fit in more physical activity by taking the stairs instead of the elevator or parking farther away from stores. Ask your healthcare provider about the best exercise plan for you.      © Copyright "[employer] 2018 Information is for End User's use only and may not be sold, redistributed or otherwise used for commercial purposes. All illustrations and images included in CareNotes® are the copyrighted property of [employer] Inc[employer] or [employer]

## 2024-12-02 NOTE — ASSESSMENT & PLAN NOTE
Currently on Vantin and symptoms resolving  Most recent urine culture grew mixed contaminants  Multiple antibiotic allergies and seems to be experiencing UTI symptoms more frequently  Urged to reestablish with urology and has an appointment in a few weeks

## 2024-12-02 NOTE — ASSESSMENT & PLAN NOTE
Orders:    benzonatate (TESSALON PERLES) 100 mg capsule; Take 1 capsule (100 mg total) by mouth 3 (three) times a day as needed for cough

## 2024-12-03 NOTE — TELEPHONE ENCOUNTER
Patients daughter called office back.  I relayed Dr. Reyes results to her verbatim.  Patients daughter stated that the patient is refusing the inhaler but got her the pearls to take.  Patients daughter expressed understanding.    No further action needed

## 2024-12-13 ENCOUNTER — TELEPHONE (OUTPATIENT)
Dept: INTERNAL MEDICINE CLINIC | Facility: CLINIC | Age: 89
End: 2024-12-13

## 2024-12-13 NOTE — TELEPHONE ENCOUNTER
Patient called requesting refill for benzonatate. Patient made aware medication was refilled on 12/02/2024 for 20 with 0 refills to [facility]   pharmacy. Patient instructed to contact the pharmacy to obtain refills of medication. Patient verbalized understanding.

## 2024-12-16 ENCOUNTER — NURSE TRIAGE (OUTPATIENT)
Age: 89
End: 2024-12-16

## 2024-12-16 DIAGNOSIS — J06.9 VIRAL UPPER RESPIRATORY TRACT INFECTION: ICD-10-CM

## 2024-12-16 RX ORDER — BENZONATATE 100 MG/1
100 CAPSULE ORAL 3 TIMES DAILY PRN
Qty: 20 CAPSULE | Refills: 0 | Status: SHIPPED | OUTPATIENT
Start: 2024-12-16

## 2024-12-16 NOTE — TELEPHONE ENCOUNTER
Reason for call: Patient state she was seen 12.02.24 and she was given this for the cold and her last one was Saturday.     Patient is requesting a refill for the cough says she feels chest tightness, also sending a call hub message because of sx.        [x] Refill   [] Prior Auth  [] Other:     Office:   [x] PCP/Provider -   [] Specialty/Provider -     Medication: benzonatate     Dose/Frequency: 100 mg TID     Quantity: ?    Pharmacy: [facility] on file     Does the patient have enough for 3 days?   [] Yes   [x] No - Send as HP to POD

## 2024-12-16 NOTE — TELEPHONE ENCOUNTER
Regarding: Chest Tightness, requesting benzonatate  ----- Message from Hilary PEREA sent at 12/16/2024  9:34 AM EST -----  Patient state she was seen 12.02.24 and she was given this for the cold and her last one was Saturday.     Patient is requesting a refill of benzonatate for the cough says she feels chest tightness already, also sent refill request to PCP at high priority.

## 2024-12-16 NOTE — TELEPHONE ENCOUNTER
Spoke to the patient, she states that she is not symptomatic other than the lingering productive cough and some hoarseness. Patient did already  the prescription. Please advise if any other direction.

## 2024-12-23 ENCOUNTER — CONSULT (OUTPATIENT)
Dept: UROLOGY | Facility: AMBULATORY SURGERY CENTER | Age: 89
End: 2024-12-23

## 2024-12-23 VITALS
WEIGHT: 181 LBS | DIASTOLIC BLOOD PRESSURE: 80 MMHG | OXYGEN SATURATION: 97 % | HEART RATE: 69 BPM | BODY MASS INDEX: 34.17 KG/M2 | HEIGHT: 61 IN | SYSTOLIC BLOOD PRESSURE: 120 MMHG

## 2024-12-23 DIAGNOSIS — R31.29 MICROSCOPIC HEMATURIA: ICD-10-CM

## 2024-12-23 DIAGNOSIS — N39.0 RECURRENT UTI: Primary | ICD-10-CM

## 2024-12-23 LAB — POST-VOID RESIDUAL VOLUME, ML POC: 49 ML

## 2024-12-23 RX ORDER — ESTRADIOL 0.1 MG/G
CREAM VAGINAL
Qty: 42.5 G | Refills: 1 | Status: SHIPPED | OUTPATIENT
Start: 2024-12-23

## 2024-12-23 NOTE — PROGRESS NOTES
Name: Olga Ramirez      : 1935      MRN: [id number]  Encounter Provider: ZULAY Goddard  Encounter Date: 2024   Encounter department: [facility] [place]  :  Assessment & Plan  Recurrent UTI  PVR in office today 49 mL  UA in office today not obtained due to patient taking Azo this morning, I would recommend getting a urine culture tomorrow as she is symptomatic today with burning and suprapubic pain, I will monitor the results of the urine culture and treat accordingly with antibiotics  Discussed adequate water and fluid intake of at least 64 ounces of water daily  Discussed the role for a supplement for UTI prevention with cranberry, vitamin C, d-mannose, and a prebiotic  Initiate vaginal estrogen twice weekly  I do think she would benefit from seeing an allergist due to multiple antibiotic allergies listed in her unsure of any of the reactions, multiple antibiotic allergies are listed with hives, other allergies do not have any reactions listed, if we are able to determine that she is not actually allergic to the antibiotics that would make it easier to treat urinary tract infections in the future  I did have a discussion with the patient and her daughter today that it has been difficult to treat her urinary tract infections due to her multiple drug allergies and resistance to antibiotics noted on prior urine cultures, I did discuss with her that we should not treat any UTIs unless there are true bacterial infections noted on urine cultures, she also admits to taking antibiotics that were not prescribed for UTIs, I discussed with her that this can cause more development of resistance of antibiotics and I would not recommend self treating with antibiotics that are not prescribed for a urinary tract infection, she is understanding of this  Instructed her to call our office with any concerns of UTI, we can order urine culture and treat accordingly  Follow-up in 8  weeks    Urine culture trend:  Urine culture from 11/20/2024 demonstrating mixed contaminants  UA with reflex to culture on 10/7/2024 did not reflex out to urine culture  Urine culture on 3/12/2024 positive for E. coli, resistant to multiple antibiotics    Orders:    Ambulatory Referral to Urology    POCT Measure PVR    Urine culture; Future    estradiol (ESTRACE VAGINAL) 0.1 mg/g vaginal cream; Apply pea size amount (about 1 gram) into the vagina twice weekly, Wednesday and Saturday nights    Ambulatory Referral to Allergy; Future    Microscopic hematuria  Upon reviewing the patient's urine cultures and urinalysis it is noted that there have been several occasions that she has had a clinically significant amount of microscopic blood in the urine that has not correlated with any positive urine cultures.  Due to this I would recommend a CT renal protocol and cystoscopy for further workup of the urinary tract to assess for any underlying abnormalities that might be contributing to the microscopic hematuria.  Would also recommend sending urine sample from the office today for cytology.  Orders:    CT renal protocol; Future        History of Present Illness   Olga Ramirez is a 89 y.o. female who presents today to the office as a new patient for further evaluation of frequent UTIs.    She has been to her PCP multiple times for ongoing complaints of UTI symptoms.  Most recently on 11/18/2024 she called into her PCPs office with complaints of UTI symptoms and dysuria.  She was prescribed a 10-day course of Vantin.    Today in the office she states that she continues with urinary symptoms with dysuria and suprapubic pain.  She states that these are the typical symptoms that she has when she has a UTI.  She does have positive urine cultures on file and other urine cultures that are demonstrating no bacteria or mixed contaminants.  She denies any gross hematuria.  She denies any suprapubic or flank pain.  She denies any  "fever/chills or nausea/vomiting.    She states that she does drink a lot of fluid throughout the day but it is typically ice teas and coffees.  She does not stay well-hydrated with water.  She did take a cranberry supplement for a short amount of time but discontinued the medication when she got a UTI.  She reports that she has never used a vaginal estrogen cream.    She reports that she has known about the microscopic hematuria for several years but she has never had a full workup for this.  She states that a lot of people in her family including her children do have microscopic hematuria.  She denies any smoking history or chemical exposure.    She is here today in the office with her daughter.    Review of Systems   Constitutional:  Negative for chills and fever.   Respiratory: Negative.  Negative for cough and shortness of breath.    Cardiovascular:  Negative for chest pain and leg swelling.   Genitourinary:  Positive for dysuria and pelvic pain. Negative for dyspareunia, flank pain, frequency, hematuria, menstrual problem, urgency, vaginal bleeding, vaginal discharge and vaginal pain.   Skin:  Negative for rash.   Neurological: Negative.    Hematological:  Negative for adenopathy. Does not bruise/bleed easily.          Objective   /80 (BP Location: Left arm, Patient Position: Sitting, Cuff Size: Adult)   Pulse 69   Ht 5' 1\" (1.549 m)   Wt 82.1 kg (181 lb)   SpO2 97%   BMI 34.20 kg/m²     Physical Exam  Vitals reviewed.   Constitutional:       Appearance: Normal appearance.   HENT:      Head: Normocephalic and atraumatic.   Eyes:      Pupils: Pupils are equal, round, and reactive to light.   Cardiovascular:      Rate and Rhythm: Normal rate.   Pulmonary:      Effort: Pulmonary effort is normal.   Musculoskeletal:      Cervical back: Normal range of motion.   Skin:     General: Skin is warm and dry.   Neurological:      General: No focal deficit present.      Mental Status: She is alert and oriented to " "person, place, and time. Mental status is at baseline.   Psychiatric:         Mood and Affect: Mood normal.         Behavior: Behavior normal.         Thought Content: Thought content normal.         Judgment: Judgment normal.          Results  No results found for: \"PSA\"  Lab Results   Component Value Date    GLUCOSE 97 09/21/2015    CALCIUM 9.6 04/29/2024     09/21/2015    K 3.8 04/29/2024    CO2 27 04/29/2024     04/29/2024    BUN 20 04/29/2024    CREATININE 0.81 04/29/2024     Lab Results   Component Value Date    WBC 6.55 04/29/2024    HGB 12.5 04/29/2024    HCT 39.0 04/29/2024    MCV 99 (H) 04/29/2024     04/29/2024       Office Urine Dip  Recent Results (from the past hour)   POCT Measure PVR    Collection Time: 12/23/24  1:51 PM   Result Value Ref Range    POST-VOID RESIDUAL VOLUME, ML POC 49 mL   ]      "

## 2024-12-23 NOTE — ASSESSMENT & PLAN NOTE
PVR in office today 49 mL  UA in office today not obtained due to patient taking Azo this morning, I would recommend getting a urine culture tomorrow as she is symptomatic today with burning and suprapubic pain, I will monitor the results of the urine culture and treat accordingly with antibiotics  Discussed adequate water and fluid intake of at least 64 ounces of water daily  Discussed the role for a supplement for UTI prevention with cranberry, vitamin C, d-mannose, and a prebiotic  Initiate vaginal estrogen twice weekly  I do think she would benefit from seeing an allergist due to multiple antibiotic allergies listed in her unsure of any of the reactions, multiple antibiotic allergies are listed with hives, other allergies do not have any reactions listed, if we are able to determine that she is not actually allergic to the antibiotics that would make it easier to treat urinary tract infections in the future  I did have a discussion with the patient and her daughter today that it has been difficult to treat her urinary tract infections due to her multiple drug allergies and resistance to antibiotics noted on prior urine cultures, I did discuss with her that we should not treat any UTIs unless there are true bacterial infections noted on urine cultures, she also admits to taking antibiotics that were not prescribed for UTIs, I discussed with her that this can cause more development of resistance of antibiotics and I would not recommend self treating with antibiotics that are not prescribed for a urinary tract infection, she is understanding of this  Instructed her to call our office with any concerns of UTI, we can order urine culture and treat accordingly  Follow-up in 8 weeks    Urine culture trend:  Urine culture from 11/20/2024 demonstrating mixed contaminants  UA with reflex to culture on 10/7/2024 did not reflex out to urine culture  Urine culture on 3/12/2024 positive for E. coli, resistant to multiple  antibiotics    Orders:    Ambulatory Referral to Urology    POCT Measure PVR    Urine culture; Future    estradiol (ESTRACE VAGINAL) 0.1 mg/g vaginal cream; Apply pea size amount (about 1 gram) into the vagina twice weekly, Wednesday and Saturday nights    Ambulatory Referral to Allergy; Future

## 2024-12-23 NOTE — ASSESSMENT & PLAN NOTE
Upon reviewing the patient's urine cultures and urinalysis it is noted that there have been several occasions that she has had a clinically significant amount of microscopic blood in the urine that has not correlated with any positive urine cultures.  Due to this I would recommend a CT renal protocol and cystoscopy for further workup of the urinary tract to assess for any underlying abnormalities that might be contributing to the microscopic hematuria.  Would also recommend sending urine sample from the office today for cytology.  Orders:    CT renal protocol; Future

## 2024-12-24 ENCOUNTER — APPOINTMENT (OUTPATIENT)
Dept: LAB | Facility: CLINIC | Age: 89
End: 2024-12-24
Payer: COMMERCIAL

## 2024-12-24 DIAGNOSIS — N39.0 RECURRENT UTI: ICD-10-CM

## 2024-12-24 PROCEDURE — 87086 URINE CULTURE/COLONY COUNT: CPT

## 2024-12-24 PROCEDURE — 87077 CULTURE AEROBIC IDENTIFY: CPT

## 2024-12-24 PROCEDURE — 87186 SC STD MICRODIL/AGAR DIL: CPT

## 2024-12-26 ENCOUNTER — RESULTS FOLLOW-UP (OUTPATIENT)
Dept: UROLOGY | Facility: AMBULATORY SURGERY CENTER | Age: 89
End: 2024-12-26

## 2024-12-26 ENCOUNTER — TELEPHONE (OUTPATIENT)
Age: 89
End: 2024-12-26

## 2024-12-26 DIAGNOSIS — N39.0 RECURRENT UTI: Primary | ICD-10-CM

## 2024-12-26 DIAGNOSIS — R31.29 MICROSCOPIC HEMATURIA: Primary | ICD-10-CM

## 2024-12-26 LAB — BACTERIA UR CULT: ABNORMAL

## 2024-12-26 RX ORDER — PREDNISONE 50 MG/1
TABLET ORAL
Qty: 3 TABLET | Refills: 0 | Status: SHIPPED | OUTPATIENT
Start: 2024-12-26

## 2024-12-26 RX ORDER — NITROFURANTOIN 25; 75 MG/1; MG/1
100 CAPSULE ORAL 2 TIMES DAILY
Qty: 14 CAPSULE | Refills: 0 | Status: SHIPPED | OUTPATIENT
Start: 2024-12-26 | End: 2025-01-02

## 2024-12-26 NOTE — TELEPHONE ENCOUNTER
Called and spoke with Danya her daughter and reviewed she will need to complete the BMP blood work with: This is a patient instruction: Patient fasting for 8 hours or longer recommended.    Danya will take her tomorrow AM to have this completed.     Performed By: LEIGH ANN Urine Pregnancy Test Result: negative Detail Level: None

## 2024-12-26 NOTE — TELEPHONE ENCOUNTER
Lab was calling in regards to the upcoming CT the patient has. She has not had any recent blood work. Radiology is requesting the Urology office add a BMP for the patient to have completed prior to the CT.     Can this please be reviewed?    When added to the chart. Can the patient please be called to be made aware of the labs needing to be completed before the CT.

## 2024-12-26 NOTE — TELEPHONE ENCOUNTER
Spoke to patient's daughter Sophia and carefully relayed all the information in Mary's note below. She wrote it all down, was in understanding of the information and grateful for the explanation. She knows to contact our office if a different antibiotic is to be tried due to any reaction to the Macrobid called into the pharmacy. I also reviewed the instructions associated with the steroid that was called in and noted it varied slightly from the info in the note. She should follow the instructions on the bottle. Phone number for Urology was given if she had any further questions or concerns.        ----- Message from ZULAY Goddard sent at 12/26/2024  9:13 AM EST -----  Can you please call Olga and let her know that her urine culture is showing positive for bacteria.  I am going to send in a course of antibiotics.  I am going to send in Macrobid which is listed as an allergy but does not have any reaction listed next to it. If she develops any reaction or hives she should reach out to our office and discontinue the medication.    Also regarding her CT renal protocol I was contacted by radiology due to her having an iodine allergy and they are recommending that she have allergy prep prior to the CT scan.  This will include a steroid pill that she should take 13 hours prior to the imaging and then the additional pill 1 hour prior to the imaging.  She should also take 50 mg of Benadryl 1 hour prior.

## 2024-12-28 ENCOUNTER — APPOINTMENT (OUTPATIENT)
Dept: LAB | Facility: CLINIC | Age: 89
End: 2024-12-28
Payer: COMMERCIAL

## 2024-12-28 DIAGNOSIS — R31.29 MICROSCOPIC HEMATURIA: ICD-10-CM

## 2024-12-28 LAB
ANION GAP SERPL CALCULATED.3IONS-SCNC: 8 MMOL/L (ref 4–13)
BUN SERPL-MCNC: 12 MG/DL (ref 5–25)
CALCIUM SERPL-MCNC: 9.6 MG/DL (ref 8.4–10.2)
CHLORIDE SERPL-SCNC: 107 MMOL/L (ref 96–108)
CO2 SERPL-SCNC: 28 MMOL/L (ref 21–32)
CREAT SERPL-MCNC: 0.83 MG/DL (ref 0.6–1.3)
GFR SERPL CREATININE-BSD FRML MDRD: 62 ML/MIN/1.73SQ M
GLUCOSE P FAST SERPL-MCNC: 97 MG/DL (ref 65–99)
POTASSIUM SERPL-SCNC: 3.9 MMOL/L (ref 3.5–5.3)
SODIUM SERPL-SCNC: 143 MMOL/L (ref 135–147)

## 2024-12-28 PROCEDURE — 80048 BASIC METABOLIC PNL TOTAL CA: CPT

## 2024-12-28 PROCEDURE — 36415 COLL VENOUS BLD VENIPUNCTURE: CPT

## 2025-01-01 PROBLEM — N39.0 RECURRENT UTI: Status: RESOLVED | Noted: 2024-12-02 | Resolved: 2025-01-01

## 2025-01-02 ENCOUNTER — OFFICE VISIT (OUTPATIENT)
Dept: CARDIOLOGY CLINIC | Facility: CLINIC | Age: OVER 89
End: 2025-01-02
Payer: COMMERCIAL

## 2025-01-02 VITALS
SYSTOLIC BLOOD PRESSURE: 128 MMHG | HEART RATE: 55 BPM | DIASTOLIC BLOOD PRESSURE: 78 MMHG | OXYGEN SATURATION: 97 % | BODY MASS INDEX: 34.96 KG/M2 | WEIGHT: 185 LBS

## 2025-01-02 DIAGNOSIS — E78.2 MIXED HYPERLIPIDEMIA: ICD-10-CM

## 2025-01-02 DIAGNOSIS — I25.10 CAD IN NATIVE ARTERY: Primary | ICD-10-CM

## 2025-01-02 DIAGNOSIS — R06.09 DYSPNEA ON EXERTION: ICD-10-CM

## 2025-01-02 DIAGNOSIS — I48.0 PAROXYSMAL ATRIAL FIBRILLATION (HCC): ICD-10-CM

## 2025-01-02 PROCEDURE — 93000 ELECTROCARDIOGRAM COMPLETE: CPT | Performed by: INTERNAL MEDICINE

## 2025-01-02 PROCEDURE — 99214 OFFICE O/P EST MOD 30 MIN: CPT | Performed by: INTERNAL MEDICINE

## 2025-01-02 NOTE — PROGRESS NOTES
Cardiology Follow Up    Olga Ramirez  1935  [id number]  [facility] CARDIAC CATH LAB  [address] [address]  [phone]    1. CAD in native artery        2. Paroxysmal atrial fibrillation (HCC)        3. Mixed hyperlipidemia        4. Dyspnea on exertion            Interval History: Cardiology follow-up.  Patient continues to do well from a cardiopulmonary, she does have mild dyspnea class I which is chronic, she is to living by herself, and does not house chores.  No exertional symptoms.  Denies chest pain, no palpitations.  No significant bleeding issues on full anticoagulation with factor Xa inhibitor plus aspirin.  Denies any focal neurological deficits denies any syncope or presyncope.  States been compliant with low-cholesterol diet not so much during the holidays arteries, lipids last checked total cholesterol 160, HDL 53, LDL 51, she is on medium intense statin therapy.  Last creatinine of 0.8 GFR in the 60s.  She does have multiple allergies.    Patient Active Problem List   Diagnosis    H/O right knee surgery    CAD in native artery    History of cardiac catheterization    Hyperlipidemia    Multiple allergies    Bursitis of left hip    Osteoporosis    Esophageal reflux    Class 1 obesity due to excess calories without serious comorbidity with body mass index (BMI) of 32.0 to 32.9 in adult    Primary osteoarthritis of right shoulder    Chronic midline low back pain without sciatica    Localized edema    Adhesive capsulitis of right shoulder    Atrial fibrillation (HCC)    Dyspnea on exertion    Mixed stress and urge urinary incontinence    Hematuria    Microscopic hematuria     Past Medical History:   Diagnosis Date    Acute bronchitis 03/17/2022    Ambulatory dysfunction 10/17/2016    Arthritis     Cardiac disease     Chest pain syndrome 12/01/2022    Dyspnea on exertion 12/01/2022    Flu-like symptoms  09/30/2024    Hyperlipidemia     Hypertension     Hypertension, essential, benign 11/06/2018    Ingrowing toenail with infection 09/26/2019    Preop cardiovascular exam 11/05/2019    Rash 06/03/2020    Stress incontinence of urine 08/11/2020    Thrombocytopenia (HCC) 11/10/2021    Added per ICD-10 guidelines--provider accepted    UTI (urinary tract infection) 05/31/2017    UTI symptoms 04/03/2023     Social History     Socioeconomic History    Marital status:      Spouse name: Not on file    Number of children: 4    Years of education: Not on file    Highest education level: Not on file   Occupational History    Occupation: Retired   Tobacco Use    Smoking status: Never     Passive exposure: Never    Smokeless tobacco: Never   Vaping Use    Vaping status: Never Used   Substance and Sexual Activity    Alcohol use: Not Currently    Drug use: No    Sexual activity: Not Currently   Other Topics Concern    Not on file   Social History Narrative    Caffeine use, active    Completed 8th grade    No caffeine use, active, per [employer]     Social Drivers of Health     Financial Resource Strain: Low Risk  (11/29/2023)    Overall Financial Resource Strain (CARDIA)     Difficulty of Paying Living Expenses: Not hard at all   Food Insecurity: No Food Insecurity (12/2/2024)    Hunger Vital Sign     Worried About Running Out of Food in the Last Year: Never true     Ran Out of Food in the Last Year: Never true   Transportation Needs: No Transportation Needs (12/2/2024)    PRAPARE - Transportation     Lack of Transportation (Medical): No     Lack of Transportation (Non-Medical): No   Physical Activity: Not on file   Stress: Not on file   Social Connections: Not on file   Intimate Partner Violence: Not on file   Housing Stability: Low Risk  (12/2/2024)    Housing Stability Vital Sign     Unable to Pay for Housing in the Last Year: No     Number of Times Moved in the Last Year: 0     Homeless in the Last Year: No      Family  History   Problem Relation Age of Onset    Breast cancer Mother 85    Leukemia Mother     Gout Mother     Hypertension Mother     Bone cancer Father 61    Cervical cancer Sister 49    Aneurysm Family     Heart attack Family         myocardial infarction    Rheum arthritis Family     Lung cancer Sister 71    No Known Problems Daughter     No Known Problems Sister     No Known Problems Daughter     No Known Problems Daughter     No Known Problems Son     No Known Problems Maternal Aunt     No Known Problems Maternal Aunt     No Known Problems Maternal Aunt     No Known Problems Paternal Aunt      Past Surgical History:   Procedure Laterality Date    APPENDECTOMY      CORONARY ANGIOPLASTY WITH STENT PLACEMENT      CYSTOSCOPY  12/29/2014    Diagnostic    HERNIA REPAIR      JOINT REPLACEMENT      bilat knee, bilat hip, L shoulder    KNEE SURGERY Bilateral     SHOULDER SURGERY  2012    TOTAL HIP ARTHROPLASTY Bilateral        Current Outpatient Medications:     acetaminophen (TYLENOL) 650 mg CR tablet, Take by mouth, Disp: , Rfl:     Ascorbic Acid (VITAMIN C) 1000 MG tablet, Take 1,000 mg by mouth daily, Disp: , Rfl:     aspirin 81 MG tablet, Take 81 mg by mouth daily, Disp: , Rfl:     benzonatate (TESSALON PERLES) 100 mg capsule, Take 1 capsule (100 mg total) by mouth 3 (three) times a day as needed for cough, Disp: 20 capsule, Rfl: 0    benzonatate (TESSALON PERLES) 100 mg capsule, Take 1 capsule (100 mg total) by mouth 3 (three) times a day as needed for cough, Disp: 20 capsule, Rfl: 0    calcium citrate-vitamin D (CITRACAL+D) 315-200 MG-UNIT per tablet, Take 1 tablet by mouth 2 (two) times a day, Disp: , Rfl:     Cholecalciferol (VITAMIN D3) 1000 units CAPS, Take 1 tablet by mouth daily, Disp: , Rfl:     estradiol (ESTRACE VAGINAL) 0.1 mg/g vaginal cream, Apply pea size amount (about 1 gram) into the vagina twice weekly, Wednesday and Saturday nights, Disp: 42.5 g, Rfl: 1    famotidine (PEPCID) 20 mg tablet, Take 1  tablet (20 mg total) by mouth daily, Disp: 90 tablet, Rfl: 1    metoprolol tartrate (LOPRESSOR) 25 mg tablet, TAKE 1 TABLET(25 MG) BY MOUTH EVERY 12 HOURS, Disp: 180 tablet, Rfl: 1    Multiple Vitamins-Minerals (CENTRUM ADULTS PO), Take 1 tablet by mouth daily, Disp: , Rfl:     nitroglycerin (NITROSTAT) 0.4 mg SL tablet, Place 1 tablet (0.4 mg total) under the tongue every 5 (five) minutes as needed for chest pain, Disp: 25 tablet, Rfl: 0    predniSONE 50 mg tablet, Take 1 tablet 13 hours prior to imaging, take additional tablet 7 hours prior to imaging, take last tablet 1 hour prior to imaging, Disp: 3 tablet, Rfl: 0    rivaroxaban (Xarelto) 20 mg tablet, TAKE 1 TABLET(20 MG) BY MOUTH DAILY WITH BREAKFAST Strength: 20 MG, Disp: 30 tablet, Rfl: 5    simvastatin (ZOCOR) 40 mg tablet, Take 1 tablet (40 mg total) by mouth daily, Disp: 90 tablet, Rfl: 1    albuterol (ProAir HFA) 90 mcg/act inhaler, Inhale 2 puffs every 6 (six) hours as needed for wheezing (Patient not taking: Reported on 12/23/2024), Disp: 8.5 g, Rfl: 0    albuterol (ProAir HFA) 90 mcg/act inhaler, Inhale 2 puffs every 6 (six) hours as needed for wheezing (Patient not taking: Reported on 12/23/2024), Disp: 8.5 g, Rfl: 0    mupirocin (BACTROBAN) 2 % ointment, Apply topically 3 (three) times a day (Patient not taking: Reported on 12/23/2024), Disp: 15 g, Rfl: 0    nitrofurantoin (MACROBID) 100 mg capsule, Take 1 capsule (100 mg total) by mouth 2 (two) times a day for 7 days (Patient not taking: Reported on 1/2/2025), Disp: 14 capsule, Rfl: 0  Allergies   Allergen Reactions    Azithromycin Other (See Comments)     Unsure of reaction    Cephalexin Hives    Ciprofloxacin Hives    Cortisone Headache    Cortizone-10 [Hydrocortisone] Headache    Cyclophosphamide Hives    Dexamethasone Hives    Erythromycin Hives    Haemophilus B Polysaccharide Vaccine Hives    Influenza Virus Vaccine Hives    Iodinated Casein     Loratadine     Macrobid  [Nitrofurantoin Monohyd  Macro] Hives    Motrin [Ibuprofen] Itching    Mucinex Chest Congestion Child [Guaifenesin] Itching    Niacin Itching    Nitrofurantoin     Omeprazole     Oxycodone-Acetaminophen Other (See Comments)    Penicillins     Percocet  [Oxycodone-Acetaminophen]     Pneumococcal Vac Polyvalent Hives    Povidone Iodine     Sulfa Antibiotics        Labs:  Appointment on 12/28/2024   Component Date Value    Sodium 12/28/2024 143     Potassium 12/28/2024 3.9     Chloride 12/28/2024 107     CO2 12/28/2024 28     ANION GAP 12/28/2024 8     BUN 12/28/2024 12     Creatinine 12/28/2024 0.83     Glucose, Fasting 12/28/2024 97     Calcium 12/28/2024 9.6     eGFR 12/28/2024 62    Appointment on 12/24/2024   Component Date Value    Urine Culture 12/24/2024 >100,000 cfu/ml Escherichia coli (A)    Consult on 12/23/2024   Component Date Value    POST-VOID RESIDUAL VOLUM* 12/23/2024 49    Appointment on 11/20/2024   Component Date Value    Color, UA 11/20/2024 Yellow     Clarity, UA 11/20/2024 Extra Turbid     Specific Gravity, UA 11/20/2024 1.014     pH, UA 11/20/2024 6.0     Leukocytes, UA 11/20/2024 Large (A)     Nitrite, UA 11/20/2024 Negative     Protein, UA 11/20/2024 30 (1+) (A)     Glucose, UA 11/20/2024 Negative     Ketones, UA 11/20/2024 Negative     Urobilinogen, UA 11/20/2024 <2.0     Bilirubin, UA 11/20/2024 Negative     Occult Blood, UA 11/20/2024 Large (A)     RBC, UA 11/20/2024 30-50 (A)     WBC, UA 11/20/2024 Innumerable (A)     Epithelial Cells 11/20/2024 Occasional     Bacteria, UA 11/20/2024 Occasional     WBC Clumps 11/20/2024 Present     Urine Culture 11/20/2024 >100,000 cfu/ml    Office Visit on 10/07/2024   Component Date Value    Color, UA 10/07/2024 Light Yellow     Clarity, UA 10/07/2024 Clear     Specific Gravity, UA 10/07/2024 1.015     pH, UA 10/07/2024 6.0     Leukocytes, UA 10/07/2024 Small (A)     Nitrite, UA 10/07/2024 Negative     Protein, UA 10/07/2024 Trace (A)     Glucose, UA 10/07/2024 Negative      Ketones, UA 10/07/2024 Negative     Urobilinogen, UA 10/07/2024 <2.0     Bilirubin, UA 10/07/2024 Negative     Occult Blood, UA 10/07/2024 Large (A)     LEUKOCYTE ESTERASE,UA 10/07/2024 neg     NITRITE,UA 10/07/2024 neg     SL AMB POCT UROBILINOGEN 10/07/2024 3.5     POCT URINE PROTEIN 10/07/2024 neg      PH,UA 10/07/2024 6.0     BLOOD,UA 10/07/2024 3+     SPECIFIC GRAVITY,UA 10/07/2024 1.020     KETONES,UA 10/07/2024 neg     BILIRUBIN,UA 10/07/2024 neg     GLUCOSE, UA 10/07/2024 neg      COLOR,UA 10/07/2024 yellow     CLARITY,UA 10/07/2024 cloudy     RBC, UA 10/07/2024 10-20 (A)     WBC, UA 10/07/2024 4-10 (A)     Epithelial Cells 10/07/2024 Occasional     Bacteria, UA 10/07/2024 None Seen     Hyaline Casts, UA 10/07/2024 0-3 (A)     Transitional Epithelial * 10/07/2024 Present    Office Visit on 09/30/2024   Component Date Value    RAPID FLU A 09/30/2024 Negative     RAPID FLU B 09/30/2024 Negative     POCT SARS-CoV-2 Ag 09/30/2024 Negative     VALID CONTROL 09/30/2024 Valid      Imaging: No results found.    Review of Systems:  Review of Systems   Constitutional:  Positive for fatigue. Negative for diaphoresis and fever.   HENT:  Negative for hearing loss and nosebleeds.    Eyes:  Negative for visual disturbance.   Respiratory:  Positive for shortness of breath. Negative for apnea, wheezing and stridor.    Cardiovascular:  Negative for chest pain, palpitations and leg swelling.   Gastrointestinal:  Negative for abdominal pain, anal bleeding and blood in stool.   Endocrine: Negative for cold intolerance.   Genitourinary:  Negative for hematuria.   Musculoskeletal:  Positive for arthralgias and gait problem.   Skin:  Negative for pallor and rash.   Neurological:  Negative for dizziness, syncope, facial asymmetry, speech difficulty and weakness.   Hematological:  Does not bruise/bleed easily.   Psychiatric/Behavioral:  Positive for sleep disturbance. The patient is not nervous/anxious.        Physical  Exam:  Physical Exam  Vitals reviewed.   Constitutional:       General: She is not in acute distress.     Appearance: She is obese. She is not ill-appearing, toxic-appearing or diaphoretic.   Eyes:      General: No scleral icterus.  Neck:      Vascular: No carotid bruit.   Cardiovascular:      Rate and Rhythm: Normal rate and regular rhythm.      Pulses: Normal pulses.      Heart sounds: Normal heart sounds. No murmur heard.     No friction rub. No gallop.   Pulmonary:      Effort: Pulmonary effort is normal. No respiratory distress.      Breath sounds: Normal breath sounds. No stridor. No wheezing, rhonchi or rales.   Musculoskeletal:      Right lower leg: No edema.      Left lower leg: No edema.   Skin:     General: Skin is warm and dry.      Capillary Refill: Capillary refill takes less than 2 seconds.      Coloration: Skin is not jaundiced or pale.      Findings: No bruising or erythema.   Neurological:      Mental Status: She is alert and oriented to person, place, and time.   Psychiatric:         Mood and Affect: Mood normal.         Discussion/Summary:     Paroxysmal atrial fibrillation, patient remains clinically and electrocardiographically in normal sinus rhythm.  Holter monitor 2022 revealed normal sinus rhythm and no sustained tachyarrhythmias.  Will continue full anticoagulation and beta-blocker.  Favor no antiarrhythmic at the present time.  Mild-to-moderate cardioembolic risk.  Will repeat a Holter monitor.  Pharmacological stress test 2022, suggested no ischemia, she was in sinus rhythm at that time, an echocardiogram revealed normal left ventricular systolic function with stage I diastolic dysfunction interestingly so, there was mild left atrial enlargement and mild aortic and mitral insufficiency.  She does have mild chronotropic incompetence sick sinus syndrome.  No indication for pacemaker therapy at the present time.  Dyspnea multifactorial, no clear-cut ischemic etiology or valvular heart  disease patient states she does snore a lot.  No history of sleep apnea.  Might consider polysomnogram  Coronary artery disease PTCA/drug stent of a large diagonal system 2015.  Previous testing as above.  Continue aspirin therapy and statin therapy.         This note was completed in part utilizing [employer] direct voice recognition software.   Grammatical errors, random word insertion, spelling mistakes, and incomplete sentences may be an occasional consequence of the system secondary to software limitations, ambient noise and hardware issues. At the time of dictation, efforts were made to edit, clarify and /or correct errors.  Please read the chart carefully and recognize, using context, where substitutions have occurred.  If you have any questions or concerns about the context, text or information contained within the body of this dictation, please contact myself, the provider, for further clarification.

## 2025-01-03 ENCOUNTER — HOSPITAL ENCOUNTER (OUTPATIENT)
Dept: CT IMAGING | Facility: HOSPITAL | Age: OVER 89
Discharge: HOME | End: 2025-01-03
Payer: COMMERCIAL

## 2025-01-03 DIAGNOSIS — R31.29 MICROSCOPIC HEMATURIA: ICD-10-CM

## 2025-01-03 PROCEDURE — 74178 CT ABD&PLV WO CNTR FLWD CNTR: CPT

## 2025-01-03 RX ORDER — IODIXANOL 320 MG/ML
90 INJECTION, SOLUTION INTRAVASCULAR
Status: COMPLETED | OUTPATIENT
Start: 2025-01-03 | End: 2025-01-03

## 2025-01-03 RX ADMIN — IODIXANOL 90 ML: 320 INJECTION, SOLUTION INTRAVASCULAR at 15:11

## 2025-01-09 ENCOUNTER — HOSPITAL ENCOUNTER (OUTPATIENT)
Dept: NON INVASIVE DIAGNOSTICS | Facility: CLINIC | Age: OVER 89
Discharge: HOME | End: 2025-01-09
Payer: COMMERCIAL

## 2025-01-09 DIAGNOSIS — I48.0 PAROXYSMAL ATRIAL FIBRILLATION (HCC): ICD-10-CM

## 2025-01-09 PROCEDURE — 93225 XTRNL ECG REC<48 HRS REC: CPT

## 2025-01-09 PROCEDURE — 93226 XTRNL ECG REC<48 HR SCAN A/R: CPT

## 2025-01-14 ENCOUNTER — OFFICE VISIT (OUTPATIENT)
Dept: PODIATRY | Facility: CLINIC | Age: OVER 89
End: 2025-01-14
Payer: COMMERCIAL

## 2025-01-14 VITALS — BODY MASS INDEX: 34.93 KG/M2 | HEIGHT: 61 IN | WEIGHT: 185 LBS

## 2025-01-14 DIAGNOSIS — L85.1 ACQUIRED KERATODERMA: ICD-10-CM

## 2025-01-14 DIAGNOSIS — I73.9 PERIPHERAL VASCULAR DISEASE, UNSPECIFIED (HCC): Primary | ICD-10-CM

## 2025-01-14 DIAGNOSIS — B35.1 ONYCHOMYCOSIS: ICD-10-CM

## 2025-01-14 PROCEDURE — 11721 DEBRIDE NAIL 6 OR MORE: CPT | Performed by: PODIATRIST

## 2025-01-14 PROCEDURE — 11056 PARNG/CUTG B9 HYPRKR LES 2-4: CPT | Performed by: PODIATRIST

## 2025-01-14 PROCEDURE — 93227 XTRNL ECG REC<48 HR R&I: CPT | Performed by: INTERNAL MEDICINE

## 2025-01-14 PROCEDURE — RECHECK: Performed by: PODIATRIST

## 2025-01-14 NOTE — PROGRESS NOTES
PATIENT:  Olga Ramirez  1935    ASSESSMENT/PLAN:  1. Peripheral vascular disease, unspecified (HCC)        2. Onychomycosis        3. Acquired keratoderma                   Discussed daily foot assessment and proper foot care.  The patient will follow up in 10 weeks for foot exam and care.      PROCEDURE:  All mycotic toenails were reduced and debrided in length, width, and girth using a nail nipper and dremel.  All hyperkeratotic skin lesion(s) were sharply pared with a scalpel / forcep with no bleeding or evidence of ulceration.  Patient tolerated procedure(s) well without complications.    HPI:  Olga Ramirez is a 89 y.o.year old female seen with her daughter for at risk foot exam and care.  The patient has class findings.  The patient complained of thick toenails and calluses.  The patient denied any acute pedal disorder.      PAST MEDICAL HISTORY:  Past Medical History:   Diagnosis Date    Acute bronchitis 03/17/2022    Ambulatory dysfunction 10/17/2016    Arthritis     Cardiac disease     Chest pain syndrome 12/01/2022    Dyspnea on exertion 12/01/2022    Flu-like symptoms 09/30/2024    Hyperlipidemia     Hypertension     Hypertension, essential, benign 11/06/2018    Ingrowing toenail with infection 09/26/2019    Preop cardiovascular exam 11/05/2019    Rash 06/03/2020    Stress incontinence of urine 08/11/2020    Thrombocytopenia (HCC) 11/10/2021    Added per ICD-10 guidelines--provider accepted    UTI (urinary tract infection) 05/31/2017    UTI symptoms 04/03/2023       PAST SURGICAL HISTORY:  Past Surgical History:   Procedure Laterality Date    APPENDECTOMY      CORONARY ANGIOPLASTY WITH STENT PLACEMENT      CYSTOSCOPY  12/29/2014    Diagnostic    HERNIA REPAIR      JOINT REPLACEMENT      bilat knee, bilat hip, L shoulder    KNEE SURGERY Bilateral     SHOULDER SURGERY  2012    TOTAL HIP ARTHROPLASTY Bilateral         ALLERGIES:  Azithromycin, Cephalexin, Ciprofloxacin, Cortisone, Cortizone-10  [hydrocortisone], Cyclophosphamide, Dexamethasone, Erythromycin, Haemophilus b polysaccharide vaccine, Influenza virus vaccine, Iodinated casein, Loratadine, Macrobid  [nitrofurantoin monohyd macro], Motrin [ibuprofen], Mucinex chest congestion child [guaifenesin], Niacin, Nitrofurantoin, Omeprazole, Oxycodone-acetaminophen, Penicillins, Percocet  [oxycodone-acetaminophen], Pneumococcal vac polyvalent, Povidone iodine, and Sulfa antibiotics    MEDICATIONS:  Current Outpatient Medications   Medication Sig Dispense Refill    acetaminophen (TYLENOL) 650 mg CR tablet Take by mouth      Ascorbic Acid (VITAMIN C) 1000 MG tablet Take 1,000 mg by mouth daily      aspirin 81 MG tablet Take 81 mg by mouth daily      benzonatate (TESSALON PERLES) 100 mg capsule Take 1 capsule (100 mg total) by mouth 3 (three) times a day as needed for cough 20 capsule 0    calcium citrate-vitamin D (CITRACAL+D) 315-200 MG-UNIT per tablet Take 1 tablet by mouth 2 (two) times a day      Cholecalciferol (VITAMIN D3) 1000 units CAPS Take 1 tablet by mouth daily      estradiol (ESTRACE VAGINAL) 0.1 mg/g vaginal cream Apply pea size amount (about 1 gram) into the vagina twice weekly, Wednesday and Saturday nights 42.5 g 1    famotidine (PEPCID) 20 mg tablet Take 1 tablet (20 mg total) by mouth daily 90 tablet 1    metoprolol tartrate (LOPRESSOR) 25 mg tablet TAKE 1 TABLET(25 MG) BY MOUTH EVERY 12 HOURS 180 tablet 1    Multiple Vitamins-Minerals (CENTRUM ADULTS PO) Take 1 tablet by mouth daily      nitroglycerin (NITROSTAT) 0.4 mg SL tablet Place 1 tablet (0.4 mg total) under the tongue every 5 (five) minutes as needed for chest pain 25 tablet 0    rivaroxaban (Xarelto) 20 mg tablet TAKE 1 TABLET(20 MG) BY MOUTH DAILY WITH BREAKFAST Strength: 20 MG 30 tablet 5    simvastatin (ZOCOR) 40 mg tablet Take 1 tablet (40 mg total) by mouth daily 90 tablet 1    albuterol (ProAir HFA) 90 mcg/act inhaler Inhale 2 puffs every 6 (six) hours as needed for wheezing  (Patient not taking: Reported on 12/23/2024) 8.5 g 0    albuterol (ProAir HFA) 90 mcg/act inhaler Inhale 2 puffs every 6 (six) hours as needed for wheezing (Patient not taking: Reported on 12/23/2024) 8.5 g 0    benzonatate (TESSALON PERLES) 100 mg capsule Take 1 capsule (100 mg total) by mouth 3 (three) times a day as needed for cough 20 capsule 0    mupirocin (BACTROBAN) 2 % ointment Apply topically 3 (three) times a day (Patient not taking: Reported on 12/23/2024) 15 g 0    predniSONE 50 mg tablet Take 1 tablet 13 hours prior to imaging, take additional tablet 7 hours prior to imaging, take last tablet 1 hour prior to imaging 3 tablet 0     No current facility-administered medications for this visit.       SOCIAL HISTORY:  Social History     Socioeconomic History    Marital status:      Spouse name: None    Number of children: 4    Years of education: None    Highest education level: None   Occupational History    Occupation: Retired   Tobacco Use    Smoking status: Never     Passive exposure: Never    Smokeless tobacco: Never   Vaping Use    Vaping status: Never Used   Substance and Sexual Activity    Alcohol use: Not Currently    Drug use: No    Sexual activity: Not Currently   Other Topics Concern    None   Social History Narrative    Caffeine use, active    Completed 8th grade    No caffeine use, active, per [employer]     Social Drivers of Health     Financial Resource Strain: Low Risk  (11/29/2023)    Overall Financial Resource Strain (CARDIA)     Difficulty of Paying Living Expenses: Not hard at all   Food Insecurity: No Food Insecurity (12/2/2024)    Hunger Vital Sign     Worried About Running Out of Food in the Last Year: Never true     Ran Out of Food in the Last Year: Never true   Transportation Needs: No Transportation Needs (12/2/2024)    PRAPARE - Transportation     Lack of Transportation (Medical): No     Lack of Transportation (Non-Medical): No   Physical Activity: Not on file   Stress:  "Not on file   Social Connections: Not on file   Intimate Partner Violence: Not on file   Housing Stability: Low Risk  (12/2/2024)    Housing Stability Vital Sign     Unable to Pay for Housing in the Last Year: No     Number of Times Moved in the Last Year: 0     Homeless in the Last Year: No        REVIEW OF SYSTEMS:  GENERAL: No fever or chills  HEART: No chest pain, or palpitation  RESPIRATORY:  No acute SOB or cough  GI: No Nausea, vomit or diarrhea  NEUROLOGIC: No syncope or acute weakness    PHYSICAL EXAM:    Ht 5' 1\" (1.549 m)   Wt 83.9 kg (185 lb)   BMI 34.96 kg/m²     VASCULAR EXAM  Dorsalis pedis  absent, Posterior tibial artery  absent.  The patient has class findings with skin atrophy, lack of digital hair, and nail dystrophy.  There is +1 and trace lower extremity edema bilaterally.      NEUROLOGIC EXAM  Sensation is intact to light touch.  No focal neurologic deficit.          DERMATOLOGIC EXAM:   No ulcer or cellulitis noted.  The patient has hypertrophic toenails X 6 with discoloration, onycholysis, and subungal debris.  No notable skin lesion.  Patient has hyperkeratosis X 2 in bilateral 1st met head.    MUSCULOSKELETAL EXAM:   No acute joint pain, edema, or redness.  No acute musculoskeletal problem.  Patient has deformity including hammertoes.          "

## 2025-01-16 ENCOUNTER — APPOINTMENT (OUTPATIENT)
Dept: LAB | Facility: CLINIC | Age: OVER 89
End: 2025-01-16
Payer: COMMERCIAL

## 2025-01-16 ENCOUNTER — NURSE TRIAGE (OUTPATIENT)
Age: OVER 89
End: 2025-01-16

## 2025-01-16 DIAGNOSIS — R39.9 UTI SYMPTOMS: Primary | ICD-10-CM

## 2025-01-16 LAB
BACTERIA UR QL AUTO: ABNORMAL /HPF
BILIRUB UR QL STRIP: NEGATIVE
CLARITY UR: ABNORMAL
COLOR UR: YELLOW
GLUCOSE UR STRIP-MCNC: NEGATIVE MG/DL
HGB UR QL STRIP.AUTO: ABNORMAL
KETONES UR STRIP-MCNC: NEGATIVE MG/DL
LEUKOCYTE ESTERASE UR QL STRIP: ABNORMAL
NITRITE UR QL STRIP: NEGATIVE
NON-SQ EPI CELLS URNS QL MICRO: ABNORMAL /HPF
PH UR STRIP.AUTO: 5.5 [PH]
PROT UR STRIP-MCNC: ABNORMAL MG/DL
RBC #/AREA URNS AUTO: ABNORMAL /HPF
SP GR UR STRIP.AUTO: 1.01 (ref 1–1.03)
UROBILINOGEN UR STRIP-ACNC: <2 MG/DL
WBC #/AREA URNS AUTO: ABNORMAL /HPF

## 2025-01-16 PROCEDURE — 87077 CULTURE AEROBIC IDENTIFY: CPT

## 2025-01-16 PROCEDURE — 87086 URINE CULTURE/COLONY COUNT: CPT

## 2025-01-16 PROCEDURE — 87147 CULTURE TYPE IMMUNOLOGIC: CPT

## 2025-01-16 PROCEDURE — 81001 URINALYSIS AUTO W/SCOPE: CPT

## 2025-01-16 PROCEDURE — 87186 SC STD MICRODIL/AGAR DIL: CPT

## 2025-01-16 NOTE — TELEPHONE ENCOUNTER
Reason for Disposition  • Message left on identified voicemail    Protocols used: No Contact or Duplicate Contact Call-Adult-[place]

## 2025-01-16 NOTE — TELEPHONE ENCOUNTER
"Patient's daughter, Rubina, called stating the patient is currently experiencing urgency, a burning sensation, and cloudy urine that started last night. She denies fever, chills, or blood in urine. I ordered UA and culture. I reviewed ED precautions, encouraged water intake, and avoiding bladder irritants. Please advise.    Reason for Disposition   All other urine symptoms    Answer Assessment - Initial Assessment Questions  1. SYMPTOM: \"What's the main symptom you're concerned about?\" (e.g., frequency, incontinence)      Urgency, burning sensation, cloudy urine    2. ONSET: \"When did the symptoms start?\"      Last night    3. PAIN: \"Is there any pain?\" If Yes, ask: \"How bad is it?\" (Scale: 1-10; mild, moderate, severe)      Discomfort    4. CAUSE: \"What do you think is causing the symptoms?\"      UTI    5. OTHER SYMPTOMS: \"Do you have any other symptoms?\" (e.g., blood in urine, fever, flank pain, pain with urination)      Denies    Protocols used: Urinary Symptoms-Adult-[place]    "

## 2025-01-16 NOTE — TELEPHONE ENCOUNTER
1st attempt. Left message on identified voicemail to please call the office back.    When she returns call, please warm transfer to CTS to review symptoms.            Regarding: Possible UTI  ----- Message from Jyoti CELIS sent at 1/16/2025 10:33 AM EST -----  Daughter was calling in regards to her mother (the patient) She started with the urge to go more last night. But then nothing would come out. This morning she is now having the burning sensation and the urine is very very cloudy.

## 2025-01-17 DIAGNOSIS — R39.9 UTI SYMPTOMS: ICD-10-CM

## 2025-01-17 RX ORDER — NITROFURANTOIN 25; 75 MG/1; MG/1
100 CAPSULE ORAL 2 TIMES DAILY
Qty: 14 CAPSULE | Refills: 0 | Status: SHIPPED | OUTPATIENT
Start: 2025-01-17 | End: 2025-01-17 | Stop reason: SDUPTHER

## 2025-01-17 RX ORDER — NITROFURANTOIN 25; 75 MG/1; MG/1
100 CAPSULE ORAL 2 TIMES DAILY
Qty: 14 CAPSULE | Refills: 0 | Status: SHIPPED | OUTPATIENT
Start: 2025-01-17 | End: 2025-01-24

## 2025-01-17 NOTE — TELEPHONE ENCOUNTER
Spoke with patient's daughter. Informed on medication being sent to the pharmacy. Instructed to complete the full course and that we will call if is to be changed.

## 2025-01-17 NOTE — TELEPHONE ENCOUNTER
Macrobid sent to pharmacy on file.  Still waiting final urine culture results.  Will adjust antibiotic if necessary.

## 2025-01-17 NOTE — TELEPHONE ENCOUNTER
Patient daughter calling back and states the medicine is not at the pharmacy    Informed it was resent again at 11:43 am and appear it was successful this time. Pharmacy continues to not receive the electronic Rx    Called and spoke with pharmacy and verbalized what has been sent. They have the Rx but don't have update insurance.    Called daughter back and informed that they have the Rx but that she has to update the insurance  info with the pharmacy

## 2025-01-17 NOTE — TELEPHONE ENCOUNTER
Patient's daughter called in regarding urine culture results. Advised it is still in process. She is requesting her urinalysis results get reviewed and an antibiotic to be started on due to the weekend. Please advise.     [facility] # [id number]

## 2025-01-19 LAB
BACTERIA UR CULT: ABNORMAL
BACTERIA UR CULT: ABNORMAL

## 2025-01-20 ENCOUNTER — RESULTS FOLLOW-UP (OUTPATIENT)
Dept: UROLOGY | Facility: AMBULATORY SURGERY CENTER | Age: OVER 89
End: 2025-01-20

## 2025-01-20 NOTE — TELEPHONE ENCOUNTER
----- Message from ZULAY Goddard sent at 1/20/2025  9:38 AM EST -----  Can you please call Olga and let her know that I reviewed her most recent urine culture.  It is positive for very low colony count of bacteria.  Due to this I would like her to continue with the antibiotics that I called in prior to the weekend.

## 2025-01-20 NOTE — TELEPHONE ENCOUNTER
I was bale to speak with patient's daughter we reviewed her most recent urine culture. It is positive for very low colony count of bacteria. Due to this we would like her to continue with the antibiotics that we called in prior to the weekend. Patient daughter is aware of everything and understood.

## 2025-02-06 ENCOUNTER — HOSPITAL ENCOUNTER (OUTPATIENT)
Dept: MRI IMAGING | Facility: HOSPITAL | Age: OVER 89
Discharge: HOME | End: 2025-02-06
Payer: COMMERCIAL

## 2025-02-06 DIAGNOSIS — N28.89 LEFT RENAL MASS: ICD-10-CM

## 2025-02-06 PROCEDURE — 74183 MRI ABD W/O CNTR FLWD CNTR: CPT

## 2025-02-06 PROCEDURE — A9585 GADOBUTROL INJECTION: HCPCS

## 2025-02-06 RX ORDER — GADOBUTROL 604.72 MG/ML
8 INJECTION INTRAVENOUS
Status: COMPLETED | OUTPATIENT
Start: 2025-02-06 | End: 2025-02-06

## 2025-02-06 RX ADMIN — GADOBUTROL 8 ML: 604.72 INJECTION INTRAVENOUS at 07:21

## 2025-02-11 ENCOUNTER — RESULTS FOLLOW-UP (OUTPATIENT)
Dept: UROLOGY | Facility: AMBULATORY SURGERY CENTER | Age: OVER 89
End: 2025-02-11

## 2025-02-17 DIAGNOSIS — I48.91 ATRIAL FIBRILLATION WITH RAPID VENTRICULAR RESPONSE (HCC): ICD-10-CM

## 2025-02-17 RX ORDER — METOPROLOL TARTRATE 25 MG/1
25 TABLET, FILM COATED ORAL EVERY 12 HOURS
Qty: 180 TABLET | Refills: 1 | Status: SHIPPED | OUTPATIENT
Start: 2025-02-17

## 2025-02-21 ENCOUNTER — OFFICE VISIT (OUTPATIENT)
Dept: UROLOGY | Facility: AMBULATORY SURGERY CENTER | Age: OVER 89
End: 2025-02-21
Payer: COMMERCIAL

## 2025-02-21 VITALS
SYSTOLIC BLOOD PRESSURE: 132 MMHG | HEART RATE: 53 BPM | DIASTOLIC BLOOD PRESSURE: 80 MMHG | HEIGHT: 61 IN | BODY MASS INDEX: 34.93 KG/M2 | WEIGHT: 185 LBS | OXYGEN SATURATION: 96 %

## 2025-02-21 DIAGNOSIS — N39.0 RECURRENT UTI: Primary | ICD-10-CM

## 2025-02-21 DIAGNOSIS — N28.89 RENAL MASS: ICD-10-CM

## 2025-02-21 PROCEDURE — 99214 OFFICE O/P EST MOD 30 MIN: CPT

## 2025-02-21 NOTE — ASSESSMENT & PLAN NOTE
"MRI of the abdomen on 2/6/2025 demonstrated: \"A 1.4 cm left renal lower pole cystic lesion with a possible posterior enhancing nodule measuring up to 0.6 cm. 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 Urology consultation is recommended for further management. If curative   measures are not taken, follow-up with MRI abdomen with and without contrast in 6 months is recommended to assess for stability.\"    I did discuss the renal mass with one of my attending physicians, Dr. Church.  It is recommended that due to the patient's age and the size of the renal mass we can continue to monitor this with a kidney and bladder ultrasound.  At this time do not recommend any additional interventions such as biopsy, surgery, or cryoablation.    I discussed with the patient that a Bosniak 3 lesion does carry a higher risk of malignancy.  But due to its small nature at this time would not recommend any additional intervention and continue to survey the area of concern.    Will follow up in 6 months with kidney and bladder US.        "

## 2025-02-21 NOTE — ASSESSMENT & PLAN NOTE
Patient with 2 positive urine cultures on file from 12/24/2024 and 1/16/2025.  Both urine cultures were positive for E. coli and were adequately treated with antibiotics.  We did discuss that recurrent urinary tract infections are 2 positive urine cultures with a 6 months or 3 positive urine cultures within 1 year.    She should continue with adequate water and fluid intake of at least 64 ounces of water daily.  Also recommend continuing with a supplement for UTI prevention with cranberry, vitamin C, d-mannose, and a prebiotic.    Continue with vaginal estrogen twice weekly.    I do think she would benefit from seeing an allergist due to multiple antibiotic allergies listed in her unsure of any of the reactions, multiple antibiotic allergies are listed with hives, other allergies do not have any reactions listed, if we are able to determine that she is not actually allergic to the antibiotics that would make it easier to treat urinary tract infections in the future.    I did have a discussion with the patient and her daughter today that it has been difficult to treat her urinary tract infections due to her multiple drug allergies and resistance to antibiotics noted on prior urine cultures, I did discuss with her that we should not treat any UTIs unless there are true bacterial infections noted on urine cultures, she also admits to taking antibiotics that were not prescribed for UTIs, I discussed with her that this can cause more development of resistance of antibiotics and I would not recommend self treating with antibiotics that are not prescribed for a urinary tract infection, she is understanding of this.     She did unfortunately have an allergic reaction to Macrobid after taking it for a second course, she developed full body hives and discontinued the medication.  Due to this Macrobid will not be an option in the future for UTI treatment.    She does have a cystoscopy scheduled in April 2025, I did discuss  with her that we could cancel the cystoscopy if she is not having any issues with recurrent urinary tract infections during this timeframe.  If she continues to have UTIs monthly could proceed with cystoscopy to rule out any abnormalities that are contributing to the UTIs.    Instructed her to call our office with any concerns for UTI, we can order urine culture and treat accordingly.    Follow-up in 6 months.

## 2025-02-21 NOTE — PROGRESS NOTES
Name: Olga Ramirez      : 1935      MRN: [id number]  Encounter Provider: ZULAY Goddard  Encounter Date: 2025   Encounter department: [facility] [place]  :  Assessment & Plan  Recurrent UTI  Patient with 2 positive urine cultures on file from 2024 and 2025.  Both urine cultures were positive for E. coli and were adequately treated with antibiotics.  We did discuss that recurrent urinary tract infections are 2 positive urine cultures with a 6 months or 3 positive urine cultures within 1 year.    She should continue with adequate water and fluid intake of at least 64 ounces of water daily.  Also recommend continuing with a supplement for UTI prevention with cranberry, vitamin C, d-mannose, and a prebiotic.    Continue with vaginal estrogen twice weekly.    I do think she would benefit from seeing an allergist due to multiple antibiotic allergies listed in her unsure of any of the reactions, multiple antibiotic allergies are listed with hives, other allergies do not have any reactions listed, if we are able to determine that she is not actually allergic to the antibiotics that would make it easier to treat urinary tract infections in the future.    I did have a discussion with the patient and her daughter today that it has been difficult to treat her urinary tract infections due to her multiple drug allergies and resistance to antibiotics noted on prior urine cultures, I did discuss with her that we should not treat any UTIs unless there are true bacterial infections noted on urine cultures, she also admits to taking antibiotics that were not prescribed for UTIs, I discussed with her that this can cause more development of resistance of antibiotics and I would not recommend self treating with antibiotics that are not prescribed for a urinary tract infection, she is understanding of this.     She did unfortunately have an allergic reaction to Macrobid after  "taking it for a second course, she developed full body hives and discontinued the medication.  Due to this Macrobid will not be an option in the future for UTI treatment.    She does have a cystoscopy scheduled in April 2025, I did discuss with her that we could cancel the cystoscopy if she is not having any issues with recurrent urinary tract infections during this timeframe.  If she continues to have UTIs monthly could proceed with cystoscopy to rule out any abnormalities that are contributing to the UTIs.    Instructed her to call our office with any concerns for UTI, we can order urine culture and treat accordingly.    Follow-up in 6 months.          Renal mass  MRI of the abdomen on 2/6/2025 demonstrated: \"A 1.4 cm left renal lower pole cystic lesion with a possible posterior enhancing nodule measuring up to 0.6 cm. 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 Urology consultation is recommended for further management. If curative   measures are not taken, follow-up with MRI abdomen with and without contrast in 6 months is recommended to assess for stability.\"    I did discuss the renal mass with one of my attending physicians, Dr. Church.  It is recommended that due to the patient's age and the size of the renal mass we can continue to monitor this with a kidney and bladder ultrasound.  At this time do not recommend any additional interventions such as biopsy, surgery, or cryoablation.    I discussed with the patient that a Bosniak 3 lesion does carry a higher risk of malignancy.  But due to its small nature at this time would not recommend any additional intervention and continue to survey the area of concern.    Will follow up in 6 months with kidney and bladder US.            History of Present Illness   Olga Ramirez is a 89 y.o. female who presents today to the office for follow-up of recurrent UTIs and a renal mass that was found on an MRI of the abdomen.  She was last seen " on 12/23/2024.    Today in the office she states she is overall doing very well.  She denies any UTI symptoms today in the office.  She reports that she continues to stay well-hydrated with water throughout the day.  She is currently still taking her vaginal estrogen and UTI prevention supplements.  She does state that she did develop a rash from the second course of Macrobid and she had to discontinue the medication due to this.  She has not yet seen an allergist and is planning to call her family doctor for a personal referral.    Regarding her renal cyst she states that she would not want any intervention for this in the future and she is overall not concerned about it.    Review of Systems   Constitutional:  Negative for chills and fever.   Respiratory: Negative.  Negative for cough and shortness of breath.    Cardiovascular:  Negative for chest pain and leg swelling.   Genitourinary:  Negative for dyspareunia, dysuria, flank pain, frequency, hematuria, menstrual problem, pelvic pain, urgency, vaginal bleeding, vaginal discharge and vaginal pain.   Skin:  Negative for rash.   Neurological: Negative.    Hematological:  Negative for adenopathy. Does not bruise/bleed easily.          Objective   There were no vitals taken for this visit.    Physical Exam  Vitals reviewed.   Constitutional:       Appearance: Normal appearance.   HENT:      Head: Normocephalic and atraumatic.   Eyes:      Pupils: Pupils are equal, round, and reactive to light.   Cardiovascular:      Rate and Rhythm: Normal rate.   Pulmonary:      Effort: Pulmonary effort is normal.   Abdominal:      General: Abdomen is flat.      Palpations: Abdomen is soft.   Musculoskeletal:      Cervical back: Normal range of motion.   Skin:     General: Skin is warm and dry.   Neurological:      General: No focal deficit present.      Mental Status: She is alert and oriented to person, place, and time. Mental status is at baseline.   Psychiatric:         Mood and  "Affect: Mood normal.         Behavior: Behavior normal.         Thought Content: Thought content normal.         Judgment: Judgment normal.           Results   No results found for: \"PSA\"  Lab Results   Component Value Date    GLUCOSE 97 09/21/2015    CALCIUM 9.6 12/28/2024     09/21/2015    K 3.9 12/28/2024    CO2 28 12/28/2024     12/28/2024    BUN 12 12/28/2024    CREATININE 0.83 12/28/2024     Lab Results   Component Value Date    WBC 6.55 04/29/2024    HGB 12.5 04/29/2024    HCT 39.0 04/29/2024    MCV 99 (H) 04/29/2024     04/29/2024       Office Urine Dip  No results found for this or any previous visit (from the past hour).      "

## 2025-03-03 DIAGNOSIS — Z91.89 DRIVING SAFETY ISSUE: Primary | ICD-10-CM

## 2025-03-10 DIAGNOSIS — N39.0 RECURRENT UTI: Primary | ICD-10-CM

## 2025-03-10 RX ORDER — CEFPODOXIME PROXETIL 100 MG/1
100 TABLET, FILM COATED ORAL 2 TIMES DAILY
Qty: 20 TABLET | Refills: 0 | Status: SHIPPED | OUTPATIENT
Start: 2025-03-10 | End: 2025-03-20

## 2025-03-23 PROBLEM — N39.0 RECURRENT UTI: Status: RESOLVED | Noted: 2024-12-02 | Resolved: 2025-03-23

## 2025-03-26 ENCOUNTER — APPOINTMENT (OUTPATIENT)
Dept: LAB | Facility: CLINIC | Age: OVER 89
End: 2025-03-26
Payer: COMMERCIAL

## 2025-03-26 DIAGNOSIS — I48.0 PAROXYSMAL ATRIAL FIBRILLATION (HCC): ICD-10-CM

## 2025-03-26 DIAGNOSIS — E78.2 MIXED HYPERLIPIDEMIA: ICD-10-CM

## 2025-03-26 DIAGNOSIS — I25.10 CAD IN NATIVE ARTERY: ICD-10-CM

## 2025-03-26 LAB
ALBUMIN SERPL BCG-MCNC: 3.8 G/DL (ref 3.5–5)
ALP SERPL-CCNC: 64 U/L (ref 34–104)
ALT SERPL W P-5'-P-CCNC: 9 U/L (ref 7–52)
ANION GAP SERPL CALCULATED.3IONS-SCNC: 6 MMOL/L (ref 4–13)
AST SERPL W P-5'-P-CCNC: 22 U/L (ref 13–39)
BILIRUB SERPL-MCNC: 0.78 MG/DL (ref 0.2–1)
BUN SERPL-MCNC: 15 MG/DL (ref 5–25)
CALCIUM SERPL-MCNC: 10 MG/DL (ref 8.4–10.2)
CHLORIDE SERPL-SCNC: 107 MMOL/L (ref 96–108)
CHOLEST SERPL-MCNC: 151 MG/DL (ref ?–200)
CO2 SERPL-SCNC: 29 MMOL/L (ref 21–32)
CREAT SERPL-MCNC: 0.79 MG/DL (ref 0.6–1.3)
GFR SERPL CREATININE-BSD FRML MDRD: 66 ML/MIN/1.73SQ M
GLUCOSE P FAST SERPL-MCNC: 101 MG/DL (ref 65–99)
HDLC SERPL-MCNC: 47 MG/DL
LDLC SERPL CALC-MCNC: 72 MG/DL (ref 0–100)
NONHDLC SERPL-MCNC: 104 MG/DL
POTASSIUM SERPL-SCNC: 4.1 MMOL/L (ref 3.5–5.3)
PROT SERPL-MCNC: 6.7 G/DL (ref 6.4–8.4)
SODIUM SERPL-SCNC: 142 MMOL/L (ref 135–147)
TRIGL SERPL-MCNC: 162 MG/DL (ref ?–150)

## 2025-03-26 PROCEDURE — 80061 LIPID PANEL: CPT

## 2025-03-26 PROCEDURE — 80053 COMPREHEN METABOLIC PANEL: CPT

## 2025-03-26 PROCEDURE — 36415 COLL VENOUS BLD VENIPUNCTURE: CPT

## 2025-03-31 ENCOUNTER — TELEPHONE (OUTPATIENT)
Dept: UROLOGY | Facility: CLINIC | Age: OVER 89
End: 2025-03-31

## 2025-03-31 NOTE — TELEPHONE ENCOUNTER
----- Message from Cordell Lee MD sent at 3/31/2025  1:36 PM EDT -----  Can we please cancel this cysto and have the patient follow up with Mary as scheduled.     I do not consider uncomplicated UTI in a female to be an indication for cystoscopy and I could cause some real harm if this 89 year old gets an infection afterward tayla given her antibiotic allergies.

## 2025-04-01 ENCOUNTER — OFFICE VISIT (OUTPATIENT)
Dept: OCCUPATIONAL THERAPY | Facility: CLINIC | Age: OVER 89
End: 2025-04-01
Payer: COMMERCIAL

## 2025-04-01 ENCOUNTER — OFFICE VISIT (OUTPATIENT)
Dept: PODIATRY | Facility: CLINIC | Age: OVER 89
End: 2025-04-01
Payer: COMMERCIAL

## 2025-04-01 VITALS — BODY MASS INDEX: 33.99 KG/M2 | WEIGHT: 180 LBS | HEIGHT: 61 IN

## 2025-04-01 DIAGNOSIS — I73.9 PERIPHERAL VASCULAR DISEASE, UNSPECIFIED (HCC): Primary | ICD-10-CM

## 2025-04-01 DIAGNOSIS — B35.1 ONYCHOMYCOSIS: ICD-10-CM

## 2025-04-01 DIAGNOSIS — I48.91 ATRIAL FIBRILLATION, UNSPECIFIED TYPE (HCC): Primary | ICD-10-CM

## 2025-04-01 PROCEDURE — 96125 COGNITIVE TEST BY HC PRO: CPT

## 2025-04-01 PROCEDURE — 11721 DEBRIDE NAIL 6 OR MORE: CPT | Performed by: PODIATRIST

## 2025-04-01 PROCEDURE — 97165 OT EVAL LOW COMPLEX 30 MIN: CPT

## 2025-04-01 NOTE — PROGRESS NOTES
PATIENT:  Olga Ramirez  1935    ASSESSMENT/PLAN:  1. Peripheral vascular disease, unspecified (HCC)        2. Onychomycosis        3. Acquired keratoderma                   Discussed daily foot assessment and proper foot care.  The patient will follow up in 10 weeks for foot exam and care.      PROCEDURE:  All mycotic toenails were reduced and debrided in length, width, and girth using a nail nipper and dremel.  Patient tolerated procedure(s) well without complications.    HPI:  Olga Ramirez is a 89 y.o.year old female seen with her daughter for at risk foot exam and care.  The patient has class findings.  The patient complained of thick toenails.  Her callus has been better with better skin care.  The patient denied any acute pedal disorder.      PAST MEDICAL HISTORY:  Past Medical History:   Diagnosis Date    Acute bronchitis 03/17/2022    Ambulatory dysfunction 10/17/2016    Arthritis     Atrial fibrillation (HCC) 2023    Cardiac disease     Chest pain syndrome 12/01/2022    Dyspnea on exertion 12/01/2022    Flu-like symptoms 09/30/2024    Hyperlipidemia     Hypertension     Hypertension, essential, benign 11/06/2018    Ingrowing toenail with infection 09/26/2019    Preop cardiovascular exam 11/05/2019    Rash 06/03/2020    Stress incontinence of urine 08/11/2020    Thrombocytopenia (HCC) 11/10/2021    Added per ICD-10 guidelines--provider accepted    UTI (urinary tract infection) 05/31/2017    UTI symptoms 04/03/2023       PAST SURGICAL HISTORY:  Past Surgical History:   Procedure Laterality Date    APPENDECTOMY      CORONARY ANGIOPLASTY WITH STENT PLACEMENT      CYSTOSCOPY  12/29/2014    Diagnostic    HERNIA REPAIR      JOINT REPLACEMENT      bilat knee, bilat hip, L shoulder    KNEE SURGERY Bilateral     SHOULDER SURGERY  2012    TOTAL HIP ARTHROPLASTY Bilateral         ALLERGIES:  Azithromycin, Cephalexin, Ciprofloxacin, Cortisone, Cortizone-10 [hydrocortisone], Cyclophosphamide,  Dexamethasone, Erythromycin, Haemophilus b polysaccharide vaccine, Influenza virus vaccine, Iodinated casein, Loratadine, Macrobid  [nitrofurantoin monohyd macro], Motrin [ibuprofen], Mucinex chest congestion child [guaifenesin], Niacin, Nitrofurantoin, Omeprazole, Oxycodone-acetaminophen, Penicillins, Percocet  [oxycodone-acetaminophen], Pneumococcal vac polyvalent, Povidone iodine, and Sulfa antibiotics    MEDICATIONS:  Current Outpatient Medications   Medication Sig Dispense Refill    acetaminophen (TYLENOL) 650 mg CR tablet Take by mouth      Ascorbic Acid (VITAMIN C) 1000 MG tablet Take 1,000 mg by mouth daily      aspirin 81 MG tablet Take 81 mg by mouth daily      calcium citrate-vitamin D (CITRACAL+D) 315-200 MG-UNIT per tablet Take 1 tablet by mouth 2 (two) times a day      Cholecalciferol (VITAMIN D3) 1000 units CAPS Take 1 tablet by mouth daily      estradiol (ESTRACE VAGINAL) 0.1 mg/g vaginal cream Apply pea size amount (about 1 gram) into the vagina twice weekly, Wednesday and Saturday nights 42.5 g 1    famotidine (PEPCID) 20 mg tablet Take 1 tablet (20 mg total) by mouth daily 90 tablet 1    metoprolol tartrate (LOPRESSOR) 25 mg tablet TAKE 1 TABLET(25 MG) BY MOUTH EVERY 12 HOURS 180 tablet 1    Multiple Vitamins-Minerals (CENTRUM ADULTS PO) Take 1 tablet by mouth daily      nitroglycerin (NITROSTAT) 0.4 mg SL tablet Place 1 tablet (0.4 mg total) under the tongue every 5 (five) minutes as needed for chest pain 25 tablet 0    rivaroxaban (Xarelto) 20 mg tablet TAKE 1 TABLET(20 MG) BY MOUTH DAILY WITH BREAKFAST Strength: 20 MG 30 tablet 5    simvastatin (ZOCOR) 40 mg tablet Take 1 tablet (40 mg total) by mouth daily 90 tablet 1     No current facility-administered medications for this visit.       SOCIAL HISTORY:  Social History     Socioeconomic History    Marital status:      Spouse name: None    Number of children: 4    Years of education: None    Highest education level: None   Occupational  "History    Occupation: Retired   Tobacco Use    Smoking status: Never     Passive exposure: Never    Smokeless tobacco: Never   Vaping Use    Vaping status: Never Used   Substance and Sexual Activity    Alcohol use: Not Currently    Drug use: No    Sexual activity: Not Currently     Partners: Male     Birth control/protection: Post-menopausal   Other Topics Concern    None   Social History Narrative    Caffeine use, active    Completed 8th grade    No caffeine use, active, per [employer]     Social Drivers of Health     Financial Resource Strain: Low Risk  (11/29/2023)    Overall Financial Resource Strain (CARDIA)     Difficulty of Paying Living Expenses: Not hard at all   Food Insecurity: No Food Insecurity (12/2/2024)    Hunger Vital Sign     Worried About Running Out of Food in the Last Year: Never true     Ran Out of Food in the Last Year: Never true   Transportation Needs: No Transportation Needs (12/2/2024)    PRAPARE - Transportation     Lack of Transportation (Medical): No     Lack of Transportation (Non-Medical): No   Physical Activity: Not on file   Stress: Not on file   Social Connections: Not on file   Intimate Partner Violence: Not on file   Housing Stability: Low Risk  (12/2/2024)    Housing Stability Vital Sign     Unable to Pay for Housing in the Last Year: No     Number of Times Moved in the Last Year: 0     Homeless in the Last Year: No        REVIEW OF SYSTEMS:  GENERAL: No fever or chills  HEART: No chest pain, or palpitation  RESPIRATORY:  No acute SOB or cough  GI: No Nausea, vomit or diarrhea  NEUROLOGIC: No syncope or acute weakness    PHYSICAL EXAM:    Ht 5' 1\" (1.549 m)   Wt 81.6 kg (180 lb)   BMI 34.01 kg/m²     VASCULAR EXAM  Dorsalis pedis  absent, Posterior tibial artery  absent.  The patient has class findings with skin atrophy, lack of digital hair, and nail dystrophy.  There is +1 and trace lower extremity edema bilaterally.      NEUROLOGIC EXAM  Sensation is intact to light touch. "  No focal neurologic deficit.          DERMATOLOGIC EXAM:   No ulcer or cellulitis noted.  The patient has hypertrophic toenails X 6 with discoloration, onycholysis, and subungal debris.     MUSCULOSKELETAL EXAM:   No acute joint pain, edema, or redness.  No acute musculoskeletal problem.  Patient has deformity including hammertoes.

## 2025-04-01 NOTE — PROGRESS NOTES
This note was not shared with the patient due to privacy exception: note includes other individuals      Occupational Therapy Fit to Drive Evaluation:      Attempt #1    Today's Date: 2025  Patient Name: Olga Ramirez  : 1935  MRN: [id number]  Referring Provider: Reyes, Lea, MD  Dx: Atrial fibrillation, unspecified type (HCC) [I48.91]      DCAT/FITNESS TO DRIVE OUTCOMES:     PATIENT'S SCORE: 70 %                DCAT SCORE RANGES:    Low Risk: 1-39% (Passing Score)     Range of Inquiry: 40-69% (On-road test warranted)    High Risk: 70-99% (Failing Score)     DRIVING IMPLICATIONS PER DCAT:  Overall, pt scoring with average and blow average scoring in each section. Pt scoring below average in mental flexibility however, therapist observing no difficulty with direction following and speed to complete. Pt with some noted difficulty with shape recognition for memory subsection of test. Due to Pt scoring being lower end of high risk scoring, Pt could be considered for on the road evaluation for further testing of safety.   ADDITIONAL THERAPY NEEDS: No additional Therapy recommended at this time      Assessment/Plan  Occupational Therapy Skilled Analysis Assessment and Plan of Care:  Pt is a 89 y.o. female referred to Occupational Therapy for Fitness to Drive Evaluation to assess pt’s cognitive, visual, and motor abilities to drive safely in community environment. The DCAT is a screen that provides objective insight into a person's risk to drive. Pt scoring cognitively at an 70% predicted probability of failing a specialized on-road test. This indicates a cognitive competence for driving is outside the range of normal with a higher probability of performing hazardous or extremely dangerous maneuvers.  Overall, pt scoring with average and blow average scoring in each section. Pt scoring below average in mental flexibility however, therapist observing no difficulty with direction following and speed to  complete. Pt with some noted difficulty with shape recognition for memory subsection of test. Due to Pt scoring being lower end of high risk scoring, Pt could be considered for on the road evaluation for further testing of safety. Forms attached within this email fro roadway to ahsan PEREA MD to discuss outcomes with pt and discuss options moving forward, prior to driving cessation.     Recommendations:   Where available, a specialized on-road evaluation through a driving rehabilitation program is recommended. Alternatively, an on-road performance evaluation through [place] [employer] can be completed. It's also recommended that pt participate in skilled neuro outpatient occupational therapy services in preparation for the on-road assessment.    D/C from OT caseload, D/C OT. MD to discuss results w/ pt.    Active Problem List:   Patient Active Problem List   Diagnosis    H/O right knee surgery    CAD in native artery    History of cardiac catheterization    Hyperlipidemia    Multiple allergies    Bursitis of left hip    Osteoporosis    Esophageal reflux    Class 1 obesity due to excess calories without serious comorbidity with body mass index (BMI) of 32.0 to 32.9 in adult    Primary osteoarthritis of right shoulder    Chronic midline low back pain without sciatica    Localized edema    Adhesive capsulitis of right shoulder    Atrial fibrillation (HCC)    Dyspnea on exertion    Mixed stress and urge urinary incontinence    Hematuria    Microscopic hematuria    Renal mass     Past Medical Hx:   Past Medical History:   Diagnosis Date    Acute bronchitis 03/17/2022    Ambulatory dysfunction 10/17/2016    Arthritis     Atrial fibrillation (HCC) 2023    Cardiac disease     Chest pain syndrome 12/01/2022    Dyspnea on exertion 12/01/2022    Flu-like symptoms 09/30/2024    Hyperlipidemia     Hypertension     Hypertension, essential, benign 11/06/2018    Ingrowing toenail with infection 09/26/2019    Preop cardiovascular  "exam 2019    Rash 2020    Stress incontinence of urine 2020    Thrombocytopenia (HCC) 11/10/2021    Added per ICD-10 guidelines--provider accepted    UTI (urinary tract infection) 2017    UTI symptoms 2023     Past Surgical Hx:   Past Surgical History:   Procedure Laterality Date    APPENDECTOMY      CORONARY ANGIOPLASTY WITH STENT PLACEMENT      CYSTOSCOPY  2014    Diagnostic    HERNIA REPAIR      JOINT REPLACEMENT      bilat knee, bilat hip, L shoulder    KNEE SURGERY Bilateral     SHOULDER SURGERY  2012    TOTAL HIP ARTHROPLASTY Bilateral         Pain Levels:   Restin    With Activity:  2- arthritis       TIME:   OT eval: 2:01pm- 2:54pm  OT DCAT 3pm- 3:45pm    Subjective: \"I do everything on my own, I dont need anyone.\"    Patient Goal: \"I want to just keep doing as I am.\"      History of Present Illness:  Pt is a 89 y.o. female seen for OT Fitness to Drive evaluation to assess pt’s cognitive, visual, and motor abilities to drive safely in community environment. Pt referred from Reyes, Lea, MD from [facility]. Below is a summary of patients current or most recent driving history including vehicle type, roads traveled, and recent auto events.    Pt reports that she is here to get all the testing done that does not get done normally at her PCP [facility] to make sure she is safe and everything is up to date. Dr. Reyes was the one who referred to come here about 6 months ago however she did not get the script officially until about one month ago. Pt states that she lives alone, completes all ADLs independently. She also completes IADLs such as cooking and cleaning daily. Pt sorts her medications into a medication sorter every Wednesday and also completes all financial mgmt tasks on her own. 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 Pt reports that her daughter will drive to [facility] as she controls her " calendar to ensure she is up to date with her appts.  Pt has history of arthritis impacting her UE ROM and has also had double knee and hip replacement as well as L shoulder replacement.     Driving History:    Communication Status: X English,         Visual History:  Last Eye  Appointment 5 months ago   Glasses/Contacts:   Yes, describe: bifocals and darkening   Cataracts:  Yes, describe: both years ago   Glaucoma:   No   Hemianopsia:   No         License Status: active    Age of Initial Licensure: 23    Vehicle Type:     CAR     Car Transfer: independent    Driving History:   GPS Use: No   Recent Accidents:   No   History of Getting Lost While Driving: None   Tickets:   No   DUI:   No    Last Drove: Thursday    Driving Goals:   Local Roads, Highway/Major Roads, Daytime Driving, and Nighttime driving      Objective    DCAT: (see attached report for further details)   Pt scoring an 70% likelihood on failing on road see attached report for further details.           DCAT Mobile Battery Cognitive Skills Analysis  These scores are the participant's performance comparatively to the general driving population from a stratified sample of drivers ages  for their cognitive skills required for safe driving. If the participant's score is unusually poor for their age group, it will impact the overall driveable score.     For this analysis, scores in the low percentile range indicate a high risk  while a high percentile range indicate lower risk:    Mental Flexibility: Trial A: Below Average      Trial B: Below Average  Lower scores show a decreased ability to switch between mental sets and connect sequential targets, showing increased driving risk    Judgement:  Collision rate: Average     Prematurity: Average  Lowers scores indicate decreased ability to respond quickly and accurate when provided with complex judgement is rquired and showing increased driving risk    Memory: Draw time: Average   Shape  replication: Below Average  Lowers scores indicate decreased working memory in the presence of distractions, showing increased driving risk    Control:  Collision rate: Average   Time in Target: Average  Lower scores indicate decreased FMC and executive function, showing increased driving risk         OPTEC vision screen: (see attached)   Contrast sensitivity: Impaired   Far acuity: 20/30    Near acuity: 20/30   Color perception: Impaired   Lateral phoria: orthophoria   Depth perception far: Impaired   Sign recognition: able to ID 9/12 road signs correctly   Color recognition: Intact      Reaction time trials: (see attached)  Average of 3 trials: 0.793  Falling within the 25th %ile for reaction time.      Rapid Pace Walk Test: (Those with greater than 9 seconds had a 3 fold increase risk of being in an at fault auto accident.)  Time:  9 seconds: WFL      Physical findings:    Cervical rotation:  R 56*, L 62*   UE and LE AROM:  RUE- impaired due to arthritis; LUE- had replaced but full ROM;  BLE WFL  UE: 4/5 MMT   LE: 4+/5 MMT        INTERVENTION COMMENTS:  Diagnosis: Atrial fibrillation, unspecified type ([facility]) [I48.91]  Precautions: Afib, BUE shoulder arthritis  Insurance: Payor: [employer]  REP / Plan: [employer]  REP / Product Type: Medicare PPO /

## 2025-04-01 NOTE — TELEPHONE ENCOUNTER
Called and left a VM for the patient's daughter Danya we will cancel the cysto  appointment on 4-7 and have the patient follow up with Mary as scheduled on 8-26. I requested a call back at [phone] to confirm all of the below information has been received.    Name: Olga Ramirez   Date: 4/7/2025   Time: 8:30 AM   Visit Type: URO CYSTO SIMPLE PROCEDURE PG   Provider: Cordell Lee MD Department:       Name: Olga Ramirez   Date: 8/26/2025   Time: 12:40 PM   Visit Type: FOLLOW UP PG   Provider: ZULAY Goddard

## 2025-04-05 ENCOUNTER — RESULTS FOLLOW-UP (OUTPATIENT)
Dept: INTERNAL MEDICINE CLINIC | Facility: CLINIC | Age: OVER 89
End: 2025-04-05

## 2025-05-17 DIAGNOSIS — E78.2 MIXED HYPERLIPIDEMIA: ICD-10-CM

## 2025-05-18 RX ORDER — SIMVASTATIN 40 MG
40 TABLET ORAL DAILY
Qty: 90 TABLET | Refills: 1 | Status: SHIPPED | OUTPATIENT
Start: 2025-05-18

## 2025-05-19 DIAGNOSIS — K21.9 GASTROESOPHAGEAL REFLUX DISEASE: ICD-10-CM

## 2025-05-19 RX ORDER — FAMOTIDINE 20 MG/1
20 TABLET, FILM COATED ORAL DAILY
Qty: 90 TABLET | Refills: 1 | Status: SHIPPED | OUTPATIENT
Start: 2025-05-19

## 2025-05-26 DIAGNOSIS — I48.91 ATRIAL FIBRILLATION WITH RAPID VENTRICULAR RESPONSE (HCC): ICD-10-CM

## 2025-05-27 RX ORDER — RIVAROXABAN 20 MG/1
20 TABLET, FILM COATED ORAL
Qty: 30 TABLET | Refills: 5 | Status: SHIPPED | OUTPATIENT
Start: 2025-05-27

## 2025-06-09 ENCOUNTER — OFFICE VISIT (OUTPATIENT)
Dept: INTERNAL MEDICINE CLINIC | Facility: CLINIC | Age: OVER 89
End: 2025-06-09
Payer: COMMERCIAL

## 2025-06-09 VITALS
SYSTOLIC BLOOD PRESSURE: 110 MMHG | TEMPERATURE: 97.4 F | HEART RATE: 64 BPM | DIASTOLIC BLOOD PRESSURE: 70 MMHG | BODY MASS INDEX: 35.01 KG/M2 | WEIGHT: 185.4 LBS | OXYGEN SATURATION: 96 % | HEIGHT: 61 IN

## 2025-06-09 DIAGNOSIS — R73.01 IMPAIRED FASTING BLOOD SUGAR: ICD-10-CM

## 2025-06-09 DIAGNOSIS — N28.89 RENAL MASS: ICD-10-CM

## 2025-06-09 DIAGNOSIS — I25.10 CAD IN NATIVE ARTERY: ICD-10-CM

## 2025-06-09 DIAGNOSIS — E78.2 MIXED HYPERLIPIDEMIA: ICD-10-CM

## 2025-06-09 DIAGNOSIS — R39.9 UTI SYMPTOMS: Primary | ICD-10-CM

## 2025-06-09 DIAGNOSIS — Z88.9 MULTIPLE ALLERGIES: ICD-10-CM

## 2025-06-09 LAB
BACTERIA UR QL AUTO: ABNORMAL /HPF
BILIRUB UR QL STRIP: NEGATIVE
CLARITY UR: ABNORMAL
COLOR UR: YELLOW
GLUCOSE UR STRIP-MCNC: NEGATIVE MG/DL
HGB UR QL STRIP.AUTO: ABNORMAL
KETONES UR STRIP-MCNC: NEGATIVE MG/DL
LEUKOCYTE ESTERASE UR QL STRIP: ABNORMAL
NITRITE UR QL STRIP: NEGATIVE
NON-SQ EPI CELLS URNS QL MICRO: ABNORMAL /HPF
PH UR STRIP.AUTO: 6 [PH]
PROT UR STRIP-MCNC: ABNORMAL MG/DL
RBC #/AREA URNS AUTO: ABNORMAL /HPF
SL AMB  POCT GLUCOSE, UA: NEGATIVE
SL AMB LEUKOCYTE ESTERASE,UA: NORMAL
SL AMB POCT BILIRUBIN,UA: NEGATIVE
SL AMB POCT BLOOD,UA: NORMAL
SL AMB POCT CLARITY,UA: NORMAL
SL AMB POCT COLOR,UA: YELLOW
SL AMB POCT KETONES,UA: NEGATIVE
SL AMB POCT NITRITE,UA: NEGATIVE
SL AMB POCT PH,UA: 6
SL AMB POCT SPECIFIC GRAVITY,UA: 1.01
SL AMB POCT URINE PROTEIN: NEGATIVE
SL AMB POCT UROBILINOGEN: 3.5
SP GR UR STRIP.AUTO: 1.01 (ref 1–1.03)
UROBILINOGEN UR STRIP-ACNC: <2 MG/DL
WBC #/AREA URNS AUTO: ABNORMAL /HPF
WBC CLUMPS # UR AUTO: PRESENT /UL

## 2025-06-09 PROCEDURE — 99214 OFFICE O/P EST MOD 30 MIN: CPT | Performed by: INTERNAL MEDICINE

## 2025-06-09 PROCEDURE — 87086 URINE CULTURE/COLONY COUNT: CPT | Performed by: INTERNAL MEDICINE

## 2025-06-09 PROCEDURE — G2211 COMPLEX E/M VISIT ADD ON: HCPCS | Performed by: INTERNAL MEDICINE

## 2025-06-09 PROCEDURE — 81002 URINALYSIS NONAUTO W/O SCOPE: CPT | Performed by: INTERNAL MEDICINE

## 2025-06-09 PROCEDURE — 87186 SC STD MICRODIL/AGAR DIL: CPT | Performed by: INTERNAL MEDICINE

## 2025-06-09 PROCEDURE — 87077 CULTURE AEROBIC IDENTIFY: CPT | Performed by: INTERNAL MEDICINE

## 2025-06-09 PROCEDURE — 81001 URINALYSIS AUTO W/SCOPE: CPT | Performed by: INTERNAL MEDICINE

## 2025-06-09 RX ORDER — CEFUROXIME AXETIL 250 MG/1
250 TABLET ORAL EVERY 12 HOURS SCHEDULED
Qty: 14 TABLET | Refills: 0 | Status: SHIPPED | OUTPATIENT
Start: 2025-06-09 | End: 2025-06-12

## 2025-06-09 NOTE — PROGRESS NOTES
Name: Olga Ramirez      : 1935      MRN: [id number]  Encounter Provider: Lea Reyes, MD  Encounter Date: 2025   Encounter department: [facility]  :  Assessment & Plan  UTI symptoms  Bladder pain/pressure started just today  The patient would like to start it right away but strongly recommended to hold off on starting antibiotic until culture is back  Daughter will wait for the culture before picking it up daughter will not  the prescription  Fortunately, penicillin and cephalosporin allergy has been ruled out from allergy testing but her most recent urine culture showed E. coli resistant to penicillin.  It is sensitive to cefuroxime  The bacteria was also sensitive to Macrobid which she took but again caused her hives  I recommend further allergy testing for fluoroquinolones  Orders:  •  cefuroxime (CEFTIN) 250 mg tablet; Take 1 tablet (250 mg total) by mouth every 12 (twelve) hours for 7 days  •  POCT urine dip  •  Urinalysis with microscopic  •  Urine culture    Mixed hyperlipidemia    Orders:  •  CBC and differential; Future  •  Comprehensive metabolic panel; Future  •  Lipid panel; Future    Renal mass  Urology has an order for an ultrasound       Impaired fasting blood sugar    Orders:  •  Comprehensive metabolic panel; Future  •  Hemoglobin A1C; Future    CAD in native artery  She remains on aspirin, statin       Multiple allergies  She would like to get allergy tested for vaccines as well  I recommended they make a follow-up appointment with Dr. Meyer  I will send her a message that the patient is seeking for more allergy testing              History of Present Illness   Here with her daughter for a follow-up  Complains of pelvic and bladder pain/pressure that restarted this morning  Recurrent UTIs, bacterial resistant to penicillin  She had allergy testing recently and penicillin, cephalosporin, azithromycin allergies ruled out      Review of Systems  "  Respiratory:  Negative for shortness of breath.    Cardiovascular:  Negative for chest pain and palpitations.   Gastrointestinal:  Negative for abdominal pain.   Genitourinary:  Positive for pelvic pain. Negative for dysuria.       Objective   /70 (BP Location: Left arm, Patient Position: Sitting, Cuff Size: Standard)   Pulse 64   Temp (!) 97.4 °F (36.3 °C) (Tympanic)   Ht 5' 1\" (1.549 m)   Wt 84.1 kg (185 lb 6.4 oz)   SpO2 96%   BMI 35.03 kg/m²      Physical Exam  Constitutional:       General: She is not in acute distress.     Appearance: She is well-developed. She is not ill-appearing, toxic-appearing or diaphoretic.     Eyes:      Conjunctiva/sclera: Conjunctivae normal.       Cardiovascular:      Rate and Rhythm: Normal rate and regular rhythm.      Heart sounds: Normal heart sounds. No murmur heard.  Pulmonary:      Effort: Pulmonary effort is normal. No respiratory distress.      Breath sounds: Normal breath sounds. No stridor. No wheezing or rales.   Abdominal:      General: There is no distension.      Palpations: Abdomen is soft. There is no mass.      Tenderness: There is abdominal tenderness in the suprapubic area. There is no guarding or rebound.     Musculoskeletal:      Cervical back: Neck supple.      Right lower leg: No edema.      Left lower leg: No edema.     Neurological:      Mental Status: She is alert and oriented to person, place, and time.     Psychiatric:         Mood and Affect: Mood normal.         Behavior: Behavior normal.         Thought Content: Thought content normal.         Judgment: Judgment normal.         "

## 2025-06-09 NOTE — ASSESSMENT & PLAN NOTE
She would like to get allergy tested for vaccines as well  I recommended they make a follow-up appointment with Dr. Meyer  I will send her a message that the patient is seeking for more allergy testing

## 2025-06-11 ENCOUNTER — RESULTS FOLLOW-UP (OUTPATIENT)
Dept: INTERNAL MEDICINE CLINIC | Facility: CLINIC | Age: OVER 89
End: 2025-06-11

## 2025-06-11 LAB
BACTERIA UR CULT: ABNORMAL

## 2025-06-12 ENCOUNTER — TELEPHONE (OUTPATIENT)
Dept: INTERNAL MEDICINE CLINIC | Facility: CLINIC | Age: OVER 89
End: 2025-06-12

## 2025-06-12 NOTE — TELEPHONE ENCOUNTER
She scored average in one portion and below average in another.  The occupational therapist at [facility] recommended on the road driving test.  Were they provided information where to have this done?

## 2025-06-12 NOTE — TELEPHONE ENCOUNTER
While reviewing a result note with daughter, she asked about results on  eval test that Olga had done.     Please advise and call daughter.

## 2025-06-14 DIAGNOSIS — N39.0 RECURRENT UTI: ICD-10-CM

## 2025-06-16 RX ORDER — ESTRADIOL 0.1 MG/G
CREAM VAGINAL
Qty: 42.5 G | Refills: 1 | Status: SHIPPED | OUTPATIENT
Start: 2025-06-16

## 2025-06-16 NOTE — TELEPHONE ENCOUNTER
Please see the previous message regarding driving test. Spoke with daughter they were never given this information. Rubina would like to know if this is a recommendation or requirement for the on road testing. Please give any information daughter would need to follow up with this.

## 2025-06-17 ENCOUNTER — PROCEDURE VISIT (OUTPATIENT)
Dept: PODIATRY | Facility: CLINIC | Age: OVER 89
End: 2025-06-17
Payer: COMMERCIAL

## 2025-06-17 VITALS — WEIGHT: 185 LBS | BODY MASS INDEX: 34.93 KG/M2 | HEIGHT: 61 IN

## 2025-06-17 DIAGNOSIS — B35.1 ONYCHOMYCOSIS: ICD-10-CM

## 2025-06-17 DIAGNOSIS — L85.1 ACQUIRED KERATODERMA: ICD-10-CM

## 2025-06-17 DIAGNOSIS — I73.9 PERIPHERAL VASCULAR DISEASE, UNSPECIFIED (HCC): Primary | ICD-10-CM

## 2025-06-17 PROCEDURE — 11056 PARNG/CUTG B9 HYPRKR LES 2-4: CPT | Performed by: PODIATRIST

## 2025-06-17 PROCEDURE — 11721 DEBRIDE NAIL 6 OR MORE: CPT | Performed by: PODIATRIST

## 2025-06-17 NOTE — PROGRESS NOTES
PATIENT:  Olga Ramirez  1935    ASSESSMENT/PLAN:  1. Peripheral vascular disease, unspecified (HCC)        2. Onychomycosis        3. Acquired keratoderma                   Discussed daily foot assessment and proper foot care.  The patient will follow up in 10 weeks for foot exam and care.      PROCEDURE:  All mycotic toenails were reduced and debrided in length, width, and girth using a nail nipper and dremel.  Patient tolerated procedure(s) well without complications.    HPI:  Olga Ramirez is a 89 y.o.year old female seen with her daughter for at risk foot exam and care.  The patient has class findings.  The patient denied any acute pedal disorder.      PAST MEDICAL HISTORY:  Past Medical History:   Diagnosis Date    Acute bronchitis 03/17/2022    Ambulatory dysfunction 10/17/2016    Arthritis     Atrial fibrillation (HCC) 2023    Cardiac disease     Chest pain syndrome 12/01/2022    Does use hearing aid     Dyspnea on exertion 12/01/2022    Eczema     Flu-like symptoms 09/30/2024    GERD (gastroesophageal reflux disease)     Hyperlipidemia     Hypertension     Hypertension, essential, benign 11/06/2018    Ingrowing toenail with infection 09/26/2019    Osteoporosis     Pneumonia     Preop cardiovascular exam 11/05/2019    Rash 06/03/2020    Stress incontinence of urine 08/11/2020    Thrombocytopenia (HCC) 11/10/2021    Added per ICD-10 guidelines--provider accepted    UTI (urinary tract infection) 05/31/2017    UTI symptoms 04/03/2023    Wears glasses        PAST SURGICAL HISTORY:  Past Surgical History:   Procedure Laterality Date    APPENDECTOMY      COLONOSCOPY      CORONARY ANGIOPLASTY WITH STENT PLACEMENT      CYSTOSCOPY  12/29/2014    Diagnostic    HERNIA REPAIR      JOINT REPLACEMENT      bilat knee, bilat hip, L shoulder    KNEE SURGERY Bilateral     SHOULDER SURGERY  2012    TOTAL HIP ARTHROPLASTY Bilateral         ALLERGIES:  Ciprofloxacin, Cortisone, Cortizone-10 [hydrocortisone],  Cyclophosphamide, Dexamethasone, Erythromycin, Haemophilus b polysaccharide vaccine, Influenza virus vaccine, Iodinated casein, Loratadine, Macrobid  [nitrofurantoin monohyd macro], Motrin [ibuprofen], Mucinex chest congestion child [guaifenesin], Niacin, Nitrofurantoin, Omeprazole, Oxycodone-acetaminophen, Percocet  [oxycodone-acetaminophen], Pneumococcal vac polyvalent, Povidone iodine, and Sulfa antibiotics    MEDICATIONS:  Current Outpatient Medications   Medication Sig Dispense Refill    acetaminophen (TYLENOL) 650 mg CR tablet Take by mouth      Ascorbic Acid (VITAMIN C) 1000 MG tablet Take 1,000 mg by mouth in the morning.      aspirin 81 MG tablet Take 81 mg by mouth in the morning.      calcium citrate-vitamin D (CITRACAL+D) 315-200 MG-UNIT per tablet Take 1 tablet by mouth in the morning and 1 tablet in the evening.      cefpodoxime (VANTIN) 100 mg tablet Take 1 tablet (100 mg total) by mouth 2 (two) times a day for 10 days 20 tablet 0    Cholecalciferol (VITAMIN D3) 1000 units CAPS Take 1 tablet by mouth in the morning.      estradiol (ESTRACE) 0.1 mg/g vaginal cream APPLY PEA SIZE AMOUNT INTO THE VAGINA TWICE WEEKLY.WEDNESDAY AND SATURDAY NIGHTS 42.5 g 1    famotidine (PEPCID) 20 mg tablet TAKE 1 TABLET(20 MG) BY MOUTH DAILY 90 tablet 1    metoprolol tartrate (LOPRESSOR) 25 mg tablet TAKE 1 TABLET(25 MG) BY MOUTH EVERY 12 HOURS 180 tablet 1    Multiple Vitamins-Minerals (CENTRUM ADULTS PO) Take 1 tablet by mouth in the morning.      nitroglycerin (NITROSTAT) 0.4 mg SL tablet Place 1 tablet (0.4 mg total) under the tongue every 5 (five) minutes as needed for chest pain 25 tablet 0    simvastatin (ZOCOR) 40 mg tablet TAKE 1 TABLET(40 MG) BY MOUTH DAILY 90 tablet 1    Xarelto 20 MG tablet TAKE 1 TABLET BY MOUTH DAILY WITH BREAKFAST 30 tablet 5     No current facility-administered medications for this visit.       SOCIAL HISTORY:  Social History     Socioeconomic History    Marital status:     Number  of children: 4   Occupational History    Occupation: Retired   Tobacco Use    Smoking status: Never     Passive exposure: Never    Smokeless tobacco: Never   Vaping Use    Vaping status: Never Used   Substance and Sexual Activity    Alcohol use: Not Currently    Drug use: No    Sexual activity: Not Currently     Partners: Male     Birth control/protection: Post-menopausal   Social History Narrative    Caffeine use, active    Completed 8th grade    No caffeine use, active, per [employer]            Who lives in your home: Self     What type of home do you live in: Apartment    Age of your home: not sure     How long have you been living there: 10 yrs     Type of heat: Baseboard    Type of fuel: Electric    What type of jae is in your bedroom: Carpet    Do you have the following in or near your home:    Air products: Window air conditioning and Humidifier    Pests: None    Pets: None    Are pets allowed in bedroom: N/A    Open fields, wooded areas nearby: Open fields and Wooded areas    Basement: None    Exposure to second hand smoke: No        Habits:    Caffeine: hot tea 3-4 cups days ice tea 3-4 cups daily     Chocolate: yes,daily     Other:              Social Drivers of Health     Financial Resource Strain: Low Risk  (11/29/2023)    Overall Financial Resource Strain (CARDIA)     Difficulty of Paying Living Expenses: Not hard at all   Food Insecurity: No Food Insecurity (12/2/2024)    Nursing - Inadequate Food Risk Classification     Worried About Running Out of Food in the Last Year: Never true     Ran Out of Food in the Last Year: Never true   Transportation Needs: No Transportation Needs (12/2/2024)    PRAPARE - Transportation     Lack of Transportation (Medical): No     Lack of Transportation (Non-Medical): No   Physical Activity: Inactive (6/3/2025)    Exercise Vital Sign     Days of Exercise per Week: 0 days     Minutes of Exercise per Session: 0 min   Housing Stability: Low Risk  (12/2/2024)    Housing  "Stability Vital Sign     Unable to Pay for Housing in the Last Year: No     Number of Times Moved in the Last Year: 0     Homeless in the Last Year: No        REVIEW OF SYSTEMS:  GENERAL: No fever or chills  HEART: No chest pain, or palpitation  RESPIRATORY:  No acute SOB or cough  GI: No Nausea, vomit or diarrhea  NEUROLOGIC: No syncope or acute weakness    PHYSICAL EXAM:    Ht 5' 1\" (1.549 m)   Wt 83.9 kg (185 lb)   BMI 34.96 kg/m²     VASCULAR EXAM  Dorsalis pedis  absent, Posterior tibial artery  absent.  The patient has class findings with skin atrophy, lack of digital hair, and nail dystrophy.  There is +1 and trace lower extremity edema bilaterally.      NEUROLOGIC EXAM  Sensation is intact to light touch.  No focal neurologic deficit.          DERMATOLOGIC EXAM:   No ulcer or cellulitis noted.  The patient has hypertrophic toenails X 6 with discoloration, onycholysis, and subungal debris.  Mild HPK X 2 in bilateral 1st met head.      MUSCULOSKELETAL EXAM:   No acute joint pain, edema, or redness.  No acute musculoskeletal problem.  Patient has deformity including hammertoes.          "

## 2025-06-17 NOTE — TELEPHONE ENCOUNTER
LM on VM for Rubina (dtr) and advised. Will forward to Dr. Reyes to review for the order upon her return to the office.

## 2025-07-02 DIAGNOSIS — Z91.89 DRIVING SAFETY ISSUE: Primary | ICD-10-CM

## 2025-07-21 ENCOUNTER — APPOINTMENT (EMERGENCY)
Dept: CT IMAGING | Facility: HOSPITAL | Age: OVER 89
End: 2025-07-21
Payer: COMMERCIAL

## 2025-07-21 ENCOUNTER — APPOINTMENT (EMERGENCY)
Dept: RADIOLOGY | Facility: HOSPITAL | Age: OVER 89
End: 2025-07-21
Payer: COMMERCIAL

## 2025-07-21 ENCOUNTER — HOSPITAL ENCOUNTER (EMERGENCY)
Facility: HOSPITAL | Age: OVER 89
Discharge: HOME | End: 2025-07-21
Attending: SURGERY | Admitting: SURGERY
Payer: COMMERCIAL

## 2025-07-21 VITALS
TEMPERATURE: 98.7 F | OXYGEN SATURATION: 96 % | HEART RATE: 62 BPM | BODY MASS INDEX: 33.99 KG/M2 | WEIGHT: 179.9 LBS | DIASTOLIC BLOOD PRESSURE: 65 MMHG | RESPIRATION RATE: 16 BRPM | SYSTOLIC BLOOD PRESSURE: 141 MMHG
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
## 2025-07-21 DIAGNOSIS — S01.112A EYEBROW LACERATION, LEFT, INITIAL ENCOUNTER: ICD-10-CM

## 2025-07-21 DIAGNOSIS — S61.212A LACERATION OF RIGHT MIDDLE FINGER WITHOUT FOREIGN BODY WITHOUT DAMAGE TO NAIL, INITIAL ENCOUNTER: ICD-10-CM

## 2025-07-21 LAB
BASE EXCESS BLDA CALC-SCNC: 1 MMOL/L (ref -2–3)
CA-I BLD-SCNC: 1.23 MMOL/L (ref 1.12–1.32)
GLUCOSE SERPL-MCNC: 108 MG/DL (ref 65–140)
HCO3 BLDA-SCNC: 26.4 MMOL/L (ref 24–30)
HCT VFR BLD CALC: 37 % (ref 34.8–46.1)
HGB BLDA-MCNC: 12.6 G/DL (ref 11.5–15.4)
HOLD SPECIMEN: NORMAL
PCO2 BLD: 28 MMOL/L (ref 21–32)
PCO2 BLD: 45.3 MM HG (ref 42–50)
PH BLD: 7.37 [PH] (ref 7.3–7.4)
PO2 BLD: 29 MM HG (ref 35–45)
POTASSIUM BLD-SCNC: 3.6 MMOL/L (ref 3.5–5.3)
SAO2 % BLD FROM PO2: 53 % (ref 60–85)
SODIUM BLD-SCNC: 141 MMOL/L (ref 136–145)
SPECIMEN SOURCE: ABNORMAL

## 2025-07-21 PROCEDURE — 99204 OFFICE O/P NEW MOD 45 MIN: CPT | Performed by: SURGERY

## 2025-07-21 PROCEDURE — 73130 X-RAY EXAM OF HAND: CPT

## 2025-07-21 PROCEDURE — 99284 EMERGENCY DEPT VISIT MOD MDM: CPT

## 2025-07-21 PROCEDURE — EDAIR PR ED AIR: Performed by: EMERGENCY MEDICINE

## 2025-07-21 PROCEDURE — 71045 X-RAY EXAM CHEST 1 VIEW: CPT

## 2025-07-21 PROCEDURE — 76705 ECHO EXAM OF ABDOMEN: CPT | Performed by: NURSE PRACTITIONER

## 2025-07-21 PROCEDURE — 85014 HEMATOCRIT: CPT

## 2025-07-21 PROCEDURE — 82330 ASSAY OF CALCIUM: CPT

## 2025-07-21 PROCEDURE — 84295 ASSAY OF SERUM SODIUM: CPT

## 2025-07-21 PROCEDURE — 93308 TTE F-UP OR LMTD: CPT | Performed by: NURSE PRACTITIONER

## 2025-07-21 PROCEDURE — 84132 ASSAY OF SERUM POTASSIUM: CPT

## 2025-07-21 PROCEDURE — 36415 COLL VENOUS BLD VENIPUNCTURE: CPT | Performed by: SURGERY

## 2025-07-21 PROCEDURE — 82803 BLOOD GASES ANY COMBINATION: CPT

## 2025-07-21 PROCEDURE — 72125 CT NECK SPINE W/O DYE: CPT

## 2025-07-21 PROCEDURE — 82947 ASSAY GLUCOSE BLOOD QUANT: CPT

## 2025-07-21 PROCEDURE — 70450 CT HEAD/BRAIN W/O DYE: CPT

## 2025-07-21 RX ORDER — LIDOCAINE HYDROCHLORIDE 10 MG/ML
10 INJECTION, SOLUTION EPIDURAL; INFILTRATION; INTRACAUDAL; PERINEURAL ONCE
Status: COMPLETED | OUTPATIENT
Start: 2025-07-21 | End: 2025-07-21

## 2025-07-21 RX ADMIN — LIDOCAINE HYDROCHLORIDE 10 ML: 10 INJECTION, SOLUTION EPIDURAL; INFILTRATION; INTRACAUDAL at 13:23

## 2025-07-21 NOTE — H&P
"H&P - Trauma   Name: Olga Ramirez 89 y.o. female I MRN: [id number]  Unit/Bed#: TR-02 I Date of Admission: 7/21/2025   Date of Service: 7/21/2025 I Hospital Day: 0     Assessment & Plan  Fall, initial encounter  Mechanical fall  Injuries listed below  Past ambulatory/p.o. trial  Eyebrow laceration, left, initial encounter  Superficial, washed out and supported with Steri-Strips  Laceration of right middle finger without foreign body without damage to nail, initial encounter  XR negative for acute fracture  Wound approximated with sutures  Suture removal in 2 weeks.  Return precautions reviewed with patient and family.    Trauma Alert: Level B   Model of Arrival: Ambulance    Trauma Team: Attending Dung and ERICA Kearns  Consultants:     None     History of Present Illness   Chief Complaint: \"I tripped\"  Mechanism:Fall     Olga Ramirez is a 89 y.o. female with history of CAD with previous stenting that she takes Plavix for, presenting today for evaluation after suffering a fall.  Patient describes that she was walking in her house when she caught her foot on the rug falling forward and striking her head.  No reported loss of consciousness.  She did cut her hand during the fall.  She reports chronic right shoulder pain and lower back pain but no new or worsening pain.  She denies any headache, dizziness, lightheadedness, nausea, vomiting.  No other complaints offered.    Review of Systems  Medical History Review: I have reviewed the patient's PMH, PSH, Social History, Family History, Meds, and Allergies   There is no immunization history for the selected administration types on file for this patient.  Last Tetanus: Unknown      ISAR Score: Did you order a geriatric consult if the score was 2 or greater?: n/a No data recorded       Objective :  Temp:  [98.7 °F (37.1 °C)] 98.7 °F (37.1 °C)  HR:  [79-80] 80  BP: (159-168)/(76-77) 159/76  Resp:  [18] 18  SpO2:  [96 %] 96 %  O2 Device: None (Room air)    Initial " Vitals:   Temperature: 98.7 °F (37.1 °C) (07/21/25 1043)  Pulse: 79 (07/21/25 1043)  Respirations: 18 (07/21/25 1043)  Blood Pressure: 168/77 (07/21/25 1043)    Primary Survey:   Airway:        Status: patent;        Pre-hospital Interventions: none        Hospital Interventions: none  Breathing:        Pre-hospital Interventions: none       Effort: normal       Right breath sounds: normal       Left breath sounds: normal  Circulation:        Rhythm: regular       Rate: regular   Right Pulses Left Pulses    R radial: 2+  R femoral: 2+  R pedal: 2+     L radial: 2+  L femoral: 2+  L pedal: 2+       Disability:        GCS: Eye: 4; Verbal: 5 Motor: 6 Total: 15       Right Pupil: 3 mm;  round;  reactive         Left Pupil:  3 mm;  round;  reactive      R Motor Strength L Motor Strength    R : 5/5  R dorsiflex: 5/5  R plantarflex: 5/5 L : 5/5  L dorsiflex: 5/5  L plantarflex: 5/5        Sensory:  No sensory deficit  Exposure:       Completed: Yes      Secondary Survey:  Physical Exam  Constitutional:       General: She is not in acute distress.     Appearance: She is not ill-appearing.   HENT:      Head: Normocephalic.      Comments: 1.5 cm laceration on left eyebrow-well-approximated     Right Ear: External ear normal.      Left Ear: External ear normal.      Nose: Nose normal.      Mouth/Throat:      Mouth: Mucous membranes are moist.     Eyes:      Extraocular Movements: Extraocular movements intact.      Pupils: Pupils are equal, round, and reactive to light.     Neck:      Comments: Cervical collar in place.  No C-spine tenderness.  Cardiovascular:      Rate and Rhythm: Normal rate and regular rhythm.      Pulses: Normal pulses.      Heart sounds: Normal heart sounds.   Pulmonary:      Effort: Pulmonary effort is normal. No respiratory distress.      Breath sounds: Normal breath sounds. No stridor. No wheezing, rhonchi or rales.   Chest:      Chest wall: No tenderness.   Abdominal:      General: Abdomen is  "flat.      Palpations: Abdomen is soft.   Genitourinary:     Comments: Pelvis stable.    Musculoskeletal:      Cervical back: No tenderness.      Comments: Moving all extremities.  Limited range of motion of right shoulder.  No CT or L-spine tenderness.  No other extremity tenderness or decreased range of motion appreciated.     Skin:     General: Skin is warm and dry.      Capillary Refill: Capillary refill takes less than 2 seconds.      Comments: 3.5 cm laceration on the right middle phalange      Neurological:      Mental Status: She is alert and oriented to person, place, and time.      Sensory: No sensory deficit.      Motor: No weakness.     Psychiatric:      Comments: Cooperative.  Speech clear.             Lab Results: I have reviewed the following results:  No results for input(s): \"WBC\", \"HGB\", \"HCT\", \"PLT\", \"BANDSPCT\", \"SODIUM\", \"K\", \"CL\", \"CO2\", \"BUN\", \"CREATININE\", \"GLUC\", \"CAIONIZED\", \"MG\", \"PHOS\", \"AST\", \"ALT\", \"ALB\", \"TBILI\", \"DBILI\", \"ALKPHOS\", \"PTT\", \"INR\", \"HSTNI0\", \"HSTNI2\", \"BNP\", \"LACTICACID\" in the last 72 hours.    Imaging Results: I have personally reviewed pertinent images saved in PACS. CT scan findings (and other pertinent positive findings on images) were discussed with radiology. My interpretation of the images/reports are as follows:  Chest Xray(s): negative for acute findings   FAST exam(s): negative for acute findings   CT Scan(s): negative for acute findings   Additional Xray(s): N/A     Other Studies: Other Study Results Review: No additional pertinent studies reviewed.  "

## 2025-07-21 NOTE — ED PROVIDER NOTES
Emergency Department Airway Evaluation and Management Form    History  Obtained from: Patient  Ciprofloxacin, Cortisone, Cortizone-10 [hydrocortisone], Cyclophosphamide, Dexamethasone, Erythromycin, Haemophilus b polysaccharide vaccine, Influenza virus vaccine, Iodinated casein, Loratadine, Macrobid  [nitrofurantoin monohyd macro], Motrin [ibuprofen], Mucinex chest congestion child [guaifenesin], Niacin, Nitrofurantoin, Omeprazole, Oxycodone-acetaminophen, Percocet  [oxycodone-acetaminophen], Pneumococcal vac polyvalent, Povidone iodine, and Sulfa antibiotics  Chief Complaint   Patient presents with    Fall     Patient reports she tripped and fell +head strike +xarelto and ASA     HPI 89-year-old female presents after having a mechanical fall at home.  Patient tripped and fell forward striking her head on the ground.  She presents with a small left eyebrow laceration and a finger laceration.  Patient takes anticoagulation for atrial fibrillation.  No reported loss of consciousness.  No numbness or weakness noted.    Past Medical History[1]  Past Surgical History[2]  Family History[3]  Social History[4]  I have reviewed and agree with the history as documented.    Review of Systems unable to obtain due to acuity    Physical Exam  /77   Pulse 79   Temp 98.7 °F (37.1 °C) (Oral)   Resp 18   Wt 81.6 kg (179 lb 14.3 oz)   SpO2 96%   BMI 33.99 kg/m²     Physical Exam patient is awake and alert, GCS is 15.  Pupils are equal and reactive.  Cervical collar placed.  Equal bilateral breath sounds noted.  Airway is intact.  2+ distal pulses noted.  No focal motor or sensory deficits.    ED Medications  Medications - No data to display    Intubation  Procedures    Notes  No acute airway intervention indicated, care relinquished to the trauma team.    Final Diagnosis  Final diagnoses:   None       ED Provider  Electronically Signed by       [1]   Past Medical History:  Diagnosis Date    Acute bronchitis 03/17/2022     Ambulatory dysfunction 10/17/2016    Arthritis     Atrial fibrillation (HCC) 2023    Cardiac disease     Chest pain syndrome 12/01/2022    Does use hearing aid     Dyspnea on exertion 12/01/2022    Eczema     Flu-like symptoms 09/30/2024    GERD (gastroesophageal reflux disease)     Hyperlipidemia     Hypertension     Hypertension, essential, benign 11/06/2018    Ingrowing toenail with infection 09/26/2019    Osteoporosis     Pneumonia     Preop cardiovascular exam 11/05/2019    Rash 06/03/2020    Stress incontinence of urine 08/11/2020    Thrombocytopenia (HCC) 11/10/2021    Added per ICD-10 guidelines--provider accepted    UTI (urinary tract infection) 05/31/2017    UTI symptoms 04/03/2023    Wears glasses    [2]   Past Surgical History:  Procedure Laterality Date    APPENDECTOMY      COLONOSCOPY      CORONARY ANGIOPLASTY WITH STENT PLACEMENT      CYSTOSCOPY  12/29/2014    Diagnostic    HERNIA REPAIR      JOINT REPLACEMENT      bilat knee, bilat hip, L shoulder    KNEE SURGERY Bilateral     SHOULDER SURGERY  2012    TOTAL HIP ARTHROPLASTY Bilateral    [3]   Family History  Problem Relation Name Age of Onset    Breast cancer Mother Shayla 85    Leukemia Mother Shayla     Gout Mother Shayla     Hypertension Mother Shayla     Bone cancer Father Anup 61    Cancer Father Anup         bone    Congenital heart disease Father Anup     Cervical cancer Sister jasen 49    Lung cancer Sister suzan 71    Parkinsonism Sister Yanely     No Known Problems Daughter Margie     No Known Problems Daughter Rubina     No Known Problems Daughter Rupesh     No Known Problems Son Best     No Known Problems Maternal Aunt She     No Known Problems Maternal Aunt Suzan     No Known Problems Maternal Aunt Melissa     No Known Problems Paternal Aunt Jaycee     Aneurysm Family      Heart attack Family          myocardial infarction    Rheum arthritis Family     [4]   Social History  Tobacco Use    Smoking status: Never     Passive  exposure: Never    Smokeless tobacco: Never   Vaping Use    Vaping status: Never Used   Substance Use Topics    Alcohol use: Not Currently    Drug use: No        Latasha Ferrell DO  07/21/25 1044

## 2025-07-21 NOTE — PROCEDURES
POC FAST US    Date/Time: 7/21/2025 10:52 AM    Performed by: ZULAY Lewis  Authorized by: ZULAY Lewis    Patient location:  Trauma  Procedure details:     Exam Type:  Diagnostic    Indications: blunt abdominal trauma and blunt chest trauma      Assess for:  Intra-abdominal fluid and pericardial effusion    Technique: FAST      Views obtained:  Heart - Pericardial sac, LUQ - Splenorenal space, Suprapubic - Pouch of Sachin and RUQ - Henriquez's Pouch    Image quality: diagnostic      Image availability:  Images available in PACS and video obtained  FAST Findings:     RUQ (Hepatorenal) free fluid: absent      LUQ (Splenorenal) free fluid: absent      Suprapubic free fluid: absent      Cardiac wall motion: identified      Pericardial effusion: absent    Interpretation:     Impressions: negative

## 2025-07-21 NOTE — CASE MANAGEMENT
Case Management ED Progress Note    Patient name Olga Ramirez  Location ED-25/ED-25 MRN [id number]  : 1935 Date 2025        OBJECTIVE:    Chief Complaint: Hand injury   Patient Class: Emergency  Preferred Pharmacy:   [id number] [place], [place] - [address] [facility]  [address] [facility] [address]  Phone: [phone] Fax: [phone]    Primary Care Provider: Lea Reyes, MD    Primary Insurance: [facility]  Secondary Insurance:     ED Progress Note:  CM responded to trauma alert. Patient in trauma bay for eval. Patient responsive. No current identified CM needs. CM will follow and update screening, assessment, and discharge planning as appropriate.

## 2025-07-22 ENCOUNTER — VBI (OUTPATIENT)
Dept: ADMINISTRATIVE | Facility: OTHER | Age: OVER 89
End: 2025-07-22

## 2025-07-22 LAB — HOLD SPECIMEN: NORMAL

## 2025-07-22 NOTE — TELEPHONE ENCOUNTER
07/22/25 1:05 PM    Patient contacted post ED visit, outreach attempt made but message could not be left. Additional outreach attempt will be made.     Thank you.  Dino Staples MA  [facility]

## 2025-07-24 NOTE — TELEPHONE ENCOUNTER
07/24/25 1:52 PM    Patient contacted post ED visit, [facility] department spoke with patient/caregiver and outreach was successful.    Thank you.  Dino Staples MA  [facility]

## 2025-08-05 ENCOUNTER — OFFICE VISIT (OUTPATIENT)
Dept: SURGERY | Facility: CLINIC | Age: OVER 89
End: 2025-08-05

## 2025-08-05 VITALS
DIASTOLIC BLOOD PRESSURE: 74 MMHG | HEART RATE: 50 BPM | OXYGEN SATURATION: 94 % | TEMPERATURE: 97.3 F | SYSTOLIC BLOOD PRESSURE: 155 MMHG

## 2025-08-05 DIAGNOSIS — S61.219A FINGER LACERATION: Primary | ICD-10-CM

## 2025-08-05 PROBLEM — S01.119A LACERATION OF EYEBROW: Status: ACTIVE | Noted: 2025-08-05

## 2025-08-05 PROBLEM — S01.119A LACERATION OF EYEBROW: Status: RESOLVED | Noted: 2025-08-05 | Resolved: 2025-08-05

## 2025-08-05 RX ORDER — CEFPODOXIME PROXETIL 100 MG/1
TABLET, FILM COATED ORAL
COMMUNITY
Start: 2025-03-10

## 2025-08-05 RX ORDER — AZITHROMYCIN 250 MG/1
TABLET, FILM COATED ORAL
COMMUNITY
Start: 2025-04-16

## 2025-08-20 DIAGNOSIS — I48.91 ATRIAL FIBRILLATION WITH RAPID VENTRICULAR RESPONSE (HCC): ICD-10-CM

## 2025-08-21 ENCOUNTER — HOSPITAL ENCOUNTER (OUTPATIENT)
Dept: ULTRASOUND IMAGING | Facility: HOSPITAL | Age: OVER 89
Discharge: HOME | End: 2025-08-21
Payer: COMMERCIAL

## 2025-08-21 DIAGNOSIS — N28.89 RENAL MASS: ICD-10-CM

## 2025-08-21 PROCEDURE — 76775 US EXAM ABDO BACK WALL LIM: CPT

## 2025-08-21 RX ORDER — METOPROLOL TARTRATE 25 MG/1
25 TABLET, FILM COATED ORAL 2 TIMES DAILY
Qty: 180 TABLET | Refills: 1 | Status: SHIPPED | OUTPATIENT
Start: 2025-08-21

## 2025-08-22 DIAGNOSIS — E78.2 MIXED HYPERLIPIDEMIA: ICD-10-CM

## 2025-08-22 RX ORDER — SIMVASTATIN 40 MG
40 TABLET ORAL DAILY
Qty: 90 TABLET | Refills: 1 | Status: SHIPPED | OUTPATIENT
Start: 2025-08-22

## 2025-08-26 PROBLEM — I73.9 PERIPHERAL VASCULAR DISEASE, UNSPECIFIED (HCC): Status: ACTIVE | Noted: 2025-08-26

## 2025-08-26 PROBLEM — B35.1 ONYCHOMYCOSIS: Status: ACTIVE | Noted: 2025-08-26

## 2025-08-26 PROBLEM — L85.1 ACQUIRED KERATODERMA: Status: ACTIVE | Noted: 2025-08-26
